# Patient Record
Sex: FEMALE | Race: WHITE | NOT HISPANIC OR LATINO | Employment: OTHER
[De-identification: names, ages, dates, MRNs, and addresses within clinical notes are randomized per-mention and may not be internally consistent; named-entity substitution may affect disease eponyms.]

---

## 2019-06-06 ENCOUNTER — TRANSCRIBE ORDERS (OUTPATIENT)
Dept: SCHEDULING | Age: 69
End: 2019-06-06

## 2019-06-06 DIAGNOSIS — J38.3 OTHER DISEASES OF VOCAL CORDS: Primary | ICD-10-CM

## 2019-06-06 DIAGNOSIS — J30.9 ALLERGIC RHINITIS: ICD-10-CM

## 2019-06-06 DIAGNOSIS — R05.9 COUGH: ICD-10-CM

## 2019-06-06 DIAGNOSIS — R06.02 SHORTNESS OF BREATH: ICD-10-CM

## 2019-06-18 ENCOUNTER — HOSPITAL ENCOUNTER (OUTPATIENT)
Dept: PULMONOLOGY | Facility: HOSPITAL | Age: 69
Discharge: HOME | End: 2019-06-18
Attending: ALLERGY & IMMUNOLOGY
Payer: MEDICARE

## 2019-06-18 ENCOUNTER — HOSPITAL ENCOUNTER (OUTPATIENT)
Dept: RADIOLOGY | Facility: HOSPITAL | Age: 69
Discharge: HOME | End: 2019-06-18
Attending: ALLERGY & IMMUNOLOGY
Payer: MEDICARE

## 2019-06-18 ENCOUNTER — TRANSCRIBE ORDERS (OUTPATIENT)
Dept: LAB | Facility: HOSPITAL | Age: 69
End: 2019-06-18

## 2019-06-18 ENCOUNTER — APPOINTMENT (OUTPATIENT)
Dept: LAB | Facility: HOSPITAL | Age: 69
End: 2019-06-18
Attending: ALLERGY & IMMUNOLOGY
Payer: MEDICARE

## 2019-06-18 DIAGNOSIS — T78.05XA ANAPHYLACTIC REACTION DUE TO TREE NUTS AND SEEDS: ICD-10-CM

## 2019-06-18 DIAGNOSIS — R05.9 COUGH: ICD-10-CM

## 2019-06-18 DIAGNOSIS — J38.3 OTHER DISEASES OF VOCAL CORDS: ICD-10-CM

## 2019-06-18 DIAGNOSIS — R79.9 ABNORMAL FINDING OF BLOOD CHEMISTRY: ICD-10-CM

## 2019-06-18 DIAGNOSIS — R06.02 SHORTNESS OF BREATH: ICD-10-CM

## 2019-06-18 DIAGNOSIS — Z79.899 OTHER LONG TERM (CURRENT) DRUG THERAPY: ICD-10-CM

## 2019-06-18 DIAGNOSIS — J45.40 MODERATE PERSISTENT ASTHMA, UNCOMPLICATED: ICD-10-CM

## 2019-06-18 DIAGNOSIS — J30.9 ALLERGIC RHINITIS: ICD-10-CM

## 2019-06-18 DIAGNOSIS — Z91.018 ALLERGY TO OTHER FOODS: ICD-10-CM

## 2019-06-18 DIAGNOSIS — R79.9 ABNORMAL FINDING OF BLOOD CHEMISTRY: Primary | ICD-10-CM

## 2019-06-18 DIAGNOSIS — D83.0 COMMON VARIABLE IMMUNODEFICIENCY WITH PREDOMINANT ABNORMALITIES OF B-CELL NUMBERS AND FUNCTION: ICD-10-CM

## 2019-06-18 LAB
ALBUMIN SERPL-MCNC: 4 G/DL (ref 3.4–5)
ALP SERPL-CCNC: 59 IU/L (ref 35–126)
ALT SERPL-CCNC: 22 IU/L (ref 11–54)
ANION GAP SERPL CALC-SCNC: 7 MEQ/L (ref 3–15)
AST SERPL-CCNC: 23 IU/L (ref 15–41)
BASOPHILS # BLD: 0.03 K/UL (ref 0.01–0.1)
BASOPHILS NFR BLD: 0.5 %
BILIRUB SERPL-MCNC: 0.4 MG/DL (ref 0.3–1.2)
BUN SERPL-MCNC: 14 MG/DL (ref 8–20)
CALCIUM SERPL-MCNC: 9.5 MG/DL (ref 8.9–10.3)
CHLORIDE SERPL-SCNC: 108 MEQ/L (ref 98–109)
CO2 SERPL-SCNC: 24 MEQ/L (ref 22–32)
CREAT SERPL-MCNC: 0.8 MG/DL
DIFFERENTIAL METHOD BLD: NORMAL
EOSINOPHIL # BLD: 0.11 K/UL (ref 0.04–0.36)
EOSINOPHIL NFR BLD: 1.9 %
ERYTHROCYTE [DISTWIDTH] IN BLOOD BY AUTOMATED COUNT: 13.1 % (ref 11.7–14.4)
GFR SERPL CREATININE-BSD FRML MDRD: >60 ML/MIN/1.73M*2
GLUCOSE SERPL-MCNC: 95 MG/DL (ref 70–99)
HCT VFR BLDCO AUTO: 43 %
HGB BLD-MCNC: 14 G/DL
IMM GRANULOCYTES # BLD AUTO: 0.01 K/UL (ref 0–0.08)
IMM GRANULOCYTES NFR BLD AUTO: 0.2 %
LYMPHOCYTES # BLD: 2.58 K/UL (ref 1.2–3.5)
LYMPHOCYTES NFR BLD: 44.7 %
MAGNESIUM SERPL-MCNC: 2.1 MG/DL (ref 1.8–2.5)
MCH RBC QN AUTO: 31.5 PG (ref 28–33.2)
MCHC RBC AUTO-ENTMCNC: 32.6 G/DL (ref 32.2–35.5)
MCV RBC AUTO: 96.6 FL (ref 83–98)
MONOCYTES # BLD: 0.37 K/UL (ref 0.28–0.8)
MONOCYTES NFR BLD: 6.4 %
NEUTROPHILS # BLD: 2.67 K/UL (ref 1.7–7)
NEUTS SEG NFR BLD: 46.3 %
NRBC BLD-RTO: 0 %
PDW BLD AUTO: 10.1 FL (ref 9.4–12.3)
PLATELET # BLD AUTO: 232 K/UL
POTASSIUM SERPL-SCNC: 4.5 MEQ/L (ref 3.6–5.1)
PROT SERPL-MCNC: 6.5 G/DL (ref 6–8.2)
RBC # BLD AUTO: 4.45 M/UL (ref 3.93–5.22)
SODIUM SERPL-SCNC: 139 MEQ/L (ref 136–144)
T4 FREE SERPL-MCNC: 0.77 NG/DL (ref 0.58–1.64)
TSH SERPL DL<=0.05 MIU/L-ACNC: 3.51 MIU/L (ref 0.34–5.6)
WBC # BLD AUTO: 5.77 K/UL

## 2019-06-18 PROCEDURE — 86003 ALLG SPEC IGE CRUDE XTRC EA: CPT | Mod: 59

## 2019-06-18 PROCEDURE — 94729 DIFFUSING CAPACITY: CPT

## 2019-06-18 PROCEDURE — 83735 ASSAY OF MAGNESIUM: CPT

## 2019-06-18 PROCEDURE — 71250 CT THORAX DX C-: CPT

## 2019-06-18 PROCEDURE — 86003 ALLG SPEC IGE CRUDE XTRC EA: CPT

## 2019-06-18 PROCEDURE — 85025 COMPLETE CBC W/AUTO DIFF WBC: CPT

## 2019-06-18 PROCEDURE — 86160 COMPLEMENT ANTIGEN: CPT | Mod: 59

## 2019-06-18 PROCEDURE — 70486 CT MAXILLOFACIAL W/O DYE: CPT

## 2019-06-18 PROCEDURE — 84443 ASSAY THYROID STIM HORMONE: CPT

## 2019-06-18 PROCEDURE — 82103 ALPHA-1-ANTITRYPSIN TOTAL: CPT

## 2019-06-18 PROCEDURE — 82164 ANGIOTENSIN I ENZYME TEST: CPT

## 2019-06-18 PROCEDURE — 82784 ASSAY IGA/IGD/IGG/IGM EACH: CPT

## 2019-06-18 PROCEDURE — 86162 COMPLEMENT TOTAL (CH50): CPT

## 2019-06-18 PROCEDURE — 86160 COMPLEMENT ANTIGEN: CPT

## 2019-06-18 PROCEDURE — 84165 PROTEIN E-PHORESIS SERUM: CPT

## 2019-06-18 PROCEDURE — 36415 COLL VENOUS BLD VENIPUNCTURE: CPT

## 2019-06-18 PROCEDURE — 84439 ASSAY OF FREE THYROXINE: CPT

## 2019-06-18 PROCEDURE — 94060 EVALUATION OF WHEEZING: CPT

## 2019-06-18 PROCEDURE — 80053 COMPREHEN METABOLIC PANEL: CPT

## 2019-06-18 PROCEDURE — 86038 ANTINUCLEAR ANTIBODIES: CPT

## 2019-06-18 PROCEDURE — 25000000 HC PHARMACY GENERAL: Performed by: ALLERGY & IMMUNOLOGY

## 2019-06-18 PROCEDURE — 86431 RHEUMATOID FACTOR QUANT: CPT

## 2019-06-18 RX ORDER — ALBUTEROL SULFATE 0.83 MG/ML
2.5 SOLUTION RESPIRATORY (INHALATION) ONCE
Status: COMPLETED | OUTPATIENT
Start: 2019-06-18 | End: 2019-06-18

## 2019-06-18 RX ADMIN — ALBUTEROL SULFATE 2.5 MG: 2.5 SOLUTION RESPIRATORY (INHALATION) at 14:30

## 2019-06-19 LAB
A1AT SERPL-MCNC: 149 MG/DL (ref 83–199)
ACE SERPL-CCNC: 46 U/L (ref 9–67)
ALBUMIN MFR UR ELPH: 53.7 % (ref 48–62)
ALBUMIN SERPL ELPH-MCNC: 3.49 G/DL (ref 3.2–4.5)
ALBUMIN/GLOB SERPL: 1.2 {RATIO} (ref 1.1–2.1)
ALMOND IGE QN: <0.35 KU/L
ALPHA1 GLOB MFR SERPL ELPH: 3.3 % (ref 2.1–4.8)
ALPHA1 GLOB SERPL ELPH-MCNC: 0.22 G/DL (ref 0.15–0.32)
ALPHA2 GLOB MFR UR ELPH: 15.2 % (ref 9.7–16.2)
ALPHA2 GLOB SERPL ELPH-MCNC: 0.99 G/DL (ref 0.7–1.1)
B-GLOBULIN SERPL ELPH-MCNC: 0.98 G/DL (ref 0.73–1.3)
BETA1 GLOB MFR UR ELPH: 15.1 % (ref 10.8–17.9)
BRAZIL NUT IGE QN: <0.35 KU/L
C3 SERPL-MCNC: 120 MG/DL (ref 80–200)
C4 SERPL-MCNC: 25 MG/DL (ref 16–45)
CASHEW NUT IGE QN: <0.35 KU/L
CH50 SERPL-ACNC: >60 U/ML (ref 31–60)
CLAM IGE QN: <0.35 KU/L
CRAB IGE/IGE TOTAL %: <0.35 KU/L
GAMMA GLOB MFR UR ELPH: 12.7 % (ref 9–23)
GAMMA GLOB SERPL ELPH-MCNC: 0.82 G/DL (ref 0.6–1.6)
HAZELNUT IGE QN: <0.35 KU/L
LOBSTER IGE QN: <0.35 KU/L
MACADAMIA IGE QN: <0.35 KU/L
PEANUT IGE QN: <0.1 KU/L
PECAN/HICK NUT IGE QN: <0.35 KU/L
PISTACHIO IGE QN: <0.35 KU/L
PROT PATTERN SERPL ELPH-IMP: NORMAL
PROT SERPL-MCNC: 6.5 G/DL (ref 6–8.2)
RHEUMATOID FACT SERPL-ACNC: <20 IU/ML
SHRIMP IGE QN: <0.35 KU/L
WALNUT IGE QN: <0.35 KU/L

## 2019-06-20 LAB
ANA SER QL IA: NEGATIVE
IGA SERPL-MCNC: 157 MG/DL (ref 85–385)
IGG SERPL-MCNC: 926 MG/DL (ref 750–1560)
IGM SERPL-MCNC: 44 MG/DL (ref 46–295)

## 2019-06-24 ENCOUNTER — TELEPHONE (OUTPATIENT)
Dept: SCHEDULING | Facility: CLINIC | Age: 69
End: 2019-06-24

## 2019-06-24 NOTE — TELEPHONE ENCOUNTER
Yisel is being referred by Dr Conrado Boss.   She has been having Sob  and abnormal cardiology testing.   She doesn't have a cardiologist however she has had two Cardiac Clearances done for surgical procedures.   One was done most recently @ [facility]  [facility]  (78)Average Rating of all Office Providers   [facility]  [address]  [place] [id number]  View All [phone]  She will contact them today and request that her records be faxed to [phone]  She couldn't remember the other location here she has had testing, she will look through her records.   No appts were available for [place], please call @[phone]

## 2019-06-24 NOTE — TELEPHONE ENCOUNTER
Dr. Khalil are we going to see her within the two weeks or can she be seen in three weeks when there is availability?

## 2019-06-24 NOTE — TELEPHONE ENCOUNTER
I reviewed her chart.  There is nothing threatening based on the information I reviewed.  She can be seen in 3 weeks.  If a new patient slot becomes available we can see her sooner.

## 2019-07-11 ENCOUNTER — TELEPHONE (OUTPATIENT)
Dept: CARDIOLOGY | Facility: CLINIC | Age: 69
End: 2019-07-11

## 2019-07-17 ENCOUNTER — OFFICE VISIT (OUTPATIENT)
Dept: CARDIOLOGY | Facility: CLINIC | Age: 69
End: 2019-07-17
Payer: MEDICARE

## 2019-07-17 VITALS
DIASTOLIC BLOOD PRESSURE: 85 MMHG | RESPIRATION RATE: 15 BRPM | HEIGHT: 61 IN | SYSTOLIC BLOOD PRESSURE: 120 MMHG | BODY MASS INDEX: 30.19 KG/M2 | HEART RATE: 74 BPM | WEIGHT: 159.9 LBS

## 2019-07-17 DIAGNOSIS — I77.89 ASCENDING AORTA ENLARGEMENT (CMS/HCC): ICD-10-CM

## 2019-07-17 DIAGNOSIS — J38.00 VOCAL CORD PARALYSIS: ICD-10-CM

## 2019-07-17 DIAGNOSIS — R06.02 SHORTNESS OF BREATH: ICD-10-CM

## 2019-07-17 DIAGNOSIS — I25.10 CORONARY ARTERY CALCIFICATION SEEN ON CT SCAN: ICD-10-CM

## 2019-07-17 DIAGNOSIS — R94.31 ABNORMAL ECG: Primary | ICD-10-CM

## 2019-07-17 PROBLEM — M47.812 CERVICAL SPONDYLOSIS: Status: ACTIVE | Noted: 2018-08-07

## 2019-07-17 PROBLEM — I05.9 MITRAL VALVE DISORDER: Status: ACTIVE | Noted: 2019-07-17

## 2019-07-17 PROBLEM — R91.8 OPACITY OF LUNG ON IMAGING STUDY: Status: ACTIVE | Noted: 2019-07-17

## 2019-07-17 PROCEDURE — 99205 OFFICE O/P NEW HI 60 MIN: CPT | Performed by: INTERNAL MEDICINE

## 2019-07-17 PROCEDURE — 93000 ELECTROCARDIOGRAM COMPLETE: CPT | Performed by: INTERNAL MEDICINE

## 2019-07-17 RX ORDER — MINERAL OIL
180 ENEMA (ML) RECTAL DAILY
COMMUNITY

## 2019-07-17 RX ORDER — ACETAMINOPHEN 500 MG
1000 TABLET ORAL DAILY PRN
COMMUNITY
End: 2023-03-07

## 2019-07-17 RX ORDER — FAMOTIDINE 20 MG/1
20 TABLET, FILM COATED ORAL DAILY
COMMUNITY

## 2019-07-17 RX ORDER — BUDESONIDE AND FORMOTEROL FUMARATE DIHYDRATE 160; 4.5 UG/1; UG/1
2 AEROSOL RESPIRATORY (INHALATION) 2 TIMES DAILY
COMMUNITY
End: 2020-07-23 | Stop reason: ALTCHOICE

## 2019-07-17 ASSESSMENT — ENCOUNTER SYMPTOMS: SHORTNESS OF BREATH: 1

## 2019-07-17 NOTE — PROGRESS NOTES
"Subjective      Patient ID: Yisel Lyle is a 69 y.o. female.  1950      HPI    The following have been reviewed and updated as appropriate in this visit:  Tobacco  Allergies  Meds  Problems  Med Hx  Surg Hx  Fam Hx  Soc Hx        Review of Systems   Respiratory: Positive for shortness of breath.        Objective     Vitals:    07/17/19 1327   Pulse: 74   Resp: 15   Weight: 72.5 kg (159 lb 14.4 oz)   Height: 1.549 m (5' 1\")     Body mass index is 30.21 kg/m².    Physical Exam    Assessment/Plan   Diagnoses and all orders for this visit:    Abnormal ECG (Primary)  -     ECG 12 lead        "

## 2019-07-17 NOTE — LETTER
Emeterio Khalil MD, [facility]    Director, Clinical Cardiology-[facility] and  [facility]    The [employer] Chair in Innovative Cardiology    Clinical Associate Professor of Medicine  [facility]  [facility]  [facility]  [facility] [address]  [address] [place] [address]    TEL  [phone]  FAX: [phone]  EMAIL: [email or web address]         7/17/2019    Aylin Madden MD  [address] [place] [address], [address]  [place] [id number]    Yisel Lyle  1950    Dear Dr. Madden,      I saw Yisel Dariela in my office today on 7/17/2019 to provide an opinion as to the assessment and management of exertional dyspnea.      She is a 69 y.o. female with a history of cervical arthropathy who underwent an anterior cervical discectomy and fusion complicated by vocal cord paralysis who has been experiencing effort related shortness of breath since that surgery.  She notes that is she is unable to talk and exercise because she becomes breathless.  She also experiences breathlessness and near syncope with recumbency which is also developed since her surgery.    She has been unable to exercise effectively due to her exertional dyspnea.  She notices inspiratory noise when she takes deep breaths.    She reports that she is chronically fatigued which developed after her cervical surgery.    She was seen by Dr. Capo Brunner on 10/17/18 for preoperative risk assessment for her upcoming surgical fusion procedure due to an abnormal ECG.  That ECG performed on 10/17/19, which I personally reviewed, revealed diffuse low voltage.    Transthoracic echocardiography performed on 10/18/18, revealed:  Normal LV size and function with an ejection fraction of 65%.  There was grade 1 diastolic dysfunction.  There was no mitral prolapse or mitral regurgitation.  Ascending thoracic aorta was top normal in  size at 3.8 cm    Exercise echocardiography performed on 10/18/18 revealed:  Exercised to a workload of 7 METS.  Nonspecific ST-T wave changes were noted on her baseline ECG.  Baseline artifact was noted.  Exercise echo was normal.    She subsequently underwent her surgery in October 2018.    Unenhanced CT imaging of her chest (she has an anaphylactic allergy to iodinated contrast) performed to evaluate her dyspnea and cough (6/6/19) revealed normal heart size with mild coronary calcifications and a mildly enlarged ascending aorta of 4 cm.  The trachea and central airways appeared to be grossly patent.  Perhaps there was minimal diffuse bronchiectasis.    She has an appointment scheduled to see Dr. Nik Brennan.  She has not undergone pulmonary function testing as of yet.    There is a strong family history of coronary artery disease and multiple maternal uncles.  She is unaware of her current lipid profile.    She is a retired teacher and is .  She has 2 grown children.      Social History   Substance Use Topics   • Smoking status: Never Smoker   • Smokeless tobacco: Never Used   • Alcohol use Yes      Comment: rare        Past Medical History:   Diagnosis Date   • Abnormal ECG    • Ascending aorta enlargement (CMS/HCC) (HCC) 7/17/2019    The ascending aorta is mildly enlarged measuring approximately 4 cm diameter.  (6/6/2019)   • Coronary artery calcification seen on CT scan 7/17/2019    Mild coronary artery calcifications on CT of chest (6/6/2019)   • Opacity of lung on imaging study 7/17/2019    8mm subsolid opacity in the posterior right upper lobe, which may be inflammatory in nature (noted on CT of chest 6/6/2019)   • Shortness of breath 7/17/2019   • Vocal cord paralysis     compliacation of anterior cervical discectomy with fusion       Past Surgical History:   Procedure Laterality Date   • ANTERIOR CERVICAL DISCECTOMY W/ FUSION  10/2018   • CATARACT EXTRACTION W/  INTRAOCULAR LENS IMPLANT Left  2017   • COLONOSCOPY W/ POLYPECTOMY      colonoscopies every 3 years   • EYE SURGERY Left 2017    fror replacement of wrong lens from cataract surgery 2 weeks prior   • REDUCTION MAMMAPLASTY Bilateral 1988   • TONSILLECTOMY  1976   • VOCAL CORD INJECTION Right 03/2019    for paralysis after acdf          Family History   Problem Relation Age of Onset   • Alzheimer's disease Mother    • Colon cancer Father    • BEVERLY disease Sister    • Coronary artery disease Mother's Brother         in all 5 maternal uncles   • No Known Problems Son    • No Known Problems Son        ALLERGIES:  Allergies   Allergen Reactions   • Iodine And Iodide Containing Products Anaphylaxis     Contrast dye - welts, and throat closed   • Venom-Honey Bee Angioedema         Current Outpatient Prescriptions:   •  acetaminophen (TYLENOL) 500 mg tablet, Take 1,000 mg by mouth daily as needed for mild pain., Disp: , Rfl:   •  aspirin/acetaminophen/caffeine (EXCEDRIN EXTRA STRENGTH ORAL), Take 2 tablets by mouth daily as needed (pain).  , Disp: , Rfl:   •  budesonide-formoterol (SYMBICORT) 160-4.5 mcg/actuation inhaler, Inhale 2 puffs 2 (two) times a day. Rinse mouth with water after use to reduce aftertaste and incidence of candidiasis. Do not swallow., Disp: , Rfl:   •  famotidine (PEPCID) 20 mg tablet, Take 20 mg by mouth daily., Disp: , Rfl:   •  fexofenadine (ALLEGRA) 180 mg tablet, Take 180 mg by mouth daily.  , Disp: , Rfl:     Review of Systems   Constitution: +weight gain, +chronic fatigue,Negative for chills, fever .   HENT: Negative for congestion, hearing loss and nosebleeds.    Eyes: Negative for visual disturbance.   Cardiovascular: + Near syncope with recumbency, + Raynaud's phenomenon in her feet, negative for chest pain, claudication, irregular heartbeat, leg swelling, near-syncope, orthopnea, palpitations, paroxysmal nocturnal dyspnea  Respiratory: + cough, + shortness of breath.    Hematologic/Lymphatic: Negative for adenopathy. Does  "not bruise/bleed easily.   Skin: Negative for rash.   Musculoskeletal: Weakness in left arm after nerve injury, negative for falls, muscle weakness and myalgias.   Gastrointestinal: Negative for abdominal pain, anorexia, constipation, hematemesis, hematochezia, melena, nausea and vomiting.   Genitourinary: Negative for dysuria, frequency and nocturia.   Neurological: Negative for dizziness, focal weakness, headaches, light-headedness, numbness and paresthesias.       PHYSICAL EXAM  Vital Signs: /85 (BP Location: Right upper arm)   Pulse 74   Resp 15   Ht 1.549 m (5' 1\")   Wt 72.5 kg (159 lb 14.4 oz)   BMI 30.21 kg/m²    General: well-developed, obese, appears well, no acute distress  HEENT: sclera anicteric, conjunctiva pink, no xanthelasma, neck supple, no thyromegaly  Chest: Expiratory stridor with deep breathing, clear to auscultation, symmetrical expansion, no scoliosis   Heart: Apical impulse normally situated, regular rhythm, normal S1, normal S2, no gallops, no murmur  JVP: normal jugular venous pressure, negative AJR  Vascular: carotid pulses: intact bilaterally, no bruit, peripheral pulses: intact bilaterally  Abd: soft, non-tender, no hepatosplenomegaly, abdominal aorta not palpable  Ext: no edema, no clubbing, no cyanosis  Skin: warm, dry, no ecchymoses  Neuro: alert, oriented x 3, normal muscle strength  Psychiatric: normal affect, normal judgment, normal insight and normal memory    LABS:     Ref. Range 6/18/2019 11:30   Sodium Latest Ref Range: 136 - 144 mEQ/L 139   Potassium Latest Ref Range: 3.6 - 5.1 mEQ/L 4.5   Chloride Latest Ref Range: 98 - 109 mEQ/L 108   CO2 Latest Ref Range: 22 - 32 mEQ/L 24   BUN Latest Ref Range: 8 - 20 mg/dL 14   Creatinine Latest Ref Range: 0.6 - 1.1 mg/dL 0.8   Glucose Latest Ref Range: 70 - 99 mg/dL 95   Calcium Latest Ref Range: 8.9 - 10.3 mg/dL 9.5   AST (SGOT) Latest Ref Range: 15 - 41 IU/L 23   ALT (SGPT) Latest Ref Range: 11 - 54 IU/L 22   Alkaline " Phosphatase Latest Ref Range: 35 - 126 IU/L 59   Total Protein Latest Ref Range: 6.0 - 8.2 g/dL 6.5   Albumin Latest Ref Range: 3.4 - 5.0 g/dL 4.0   Bilirubin, Total Latest Ref Range: 0.3 - 1.2 mg/dL 0.4   eGFR Latest Ref Range: >=60.0 mL/min/1.73m*2 >60.0   Anion Gap Latest Ref Range: 3 - 15 mEQ/L 7   Complement, Total Latest Ref Range: 31 - 60 U/mL >60 (H)   IgA Latest Ref Range: 85 - 385 mg/dL 157   Magnesium Latest Ref Range: 1.8 - 2.5 mg/dL 2.1   TSH Latest Ref Range: 0.34 - 5.60 mIU/L 3.51   Free T4 Latest Ref Range: 0.58 - 1.64 ng/dL 0.77   Alpha 1 % Latest Ref Range: 2.1 - 4.8 % 3.3   Alpha 2  Latest Ref Range: 9.7 - 16.2 % 15.2   Beta 1  Latest Ref Range: 10.8 - 17.9 % 15.1   Gamma Globulin Latest Ref Range: 9.0 - 23.0 % 12.7   ALBUMIN/GLOBULIN RATIO Latest Ref Range: 1.1 - 2.1  1.2   SPEP Interpretation Unknown Normal protein wi...   ALPHA 1 GLOBULIN/TOTAL PROTEIN IN URINE BY ELECTOPHORESIS Latest Ref Range: 0.15 - 0.32 g/dL 0.22   Alpha 2 Latest Ref Range: 0.70 - 1.10 g/dL 0.99   Beta Latest Ref Range: 0.73 - 1.30 g/dL 0.98   Gamma Globulin Latest Ref Range: 0.60 - 1.60 g/dL 0.82   Albumin Electrophoresis Latest Ref Range: 3.2 - 4.5 g/dL 3.49   SULY Latest Ref Range: Negative  Negative   Rheumatoid Factor Latest Ref Range: <=20.0 IU/mL <20.0   C3 Complement Latest Ref Range: 80 - 200 mg/dL 120   C4 Complement Latest Ref Range: 16 - 45 mg/dL 25   Total IgG Latest Ref Range: 750-1,560 mg/dL 926   IgM Latest Ref Range: 46 - 295 mg/dL 44 (L)   WBC Latest Ref Range: 3.80 - 10.50 K/uL 5.77   RBC Latest Ref Range: 3.93 - 5.22 M/uL 4.45   Hemoglobin Latest Ref Range: 11.8 - 15.7 g/dL 14.0   Hematocrit Latest Ref Range: 35.0 - 45.0 % 43.0   MCV Latest Ref Range: 83.0 - 98.0 fL 96.6   MCH Latest Ref Range: 28.0 - 33.2 pg 31.5   MCHC Latest Ref Range: 32.2 - 35.5 g/dL 32.6   RDW Latest Ref Range: 11.7 - 14.4 % 13.1   Platelets Latest Ref Range: 150 - 369 K/uL 232   MPV Latest Ref Range: 9.4 - 12.3 fL 10.1    Differential Type Unknown Auto   nRBC Latest Ref Range: <=0.0 % 0.0   Immature Granulocytes Latest Units: % 0.2   Neutrophils Latest Units: % 46.3   Lymphocytes Latest Units: % 44.7   Monocytes Latest Units: % 6.4   Eosinophils Latest Units: % 1.9   Basophils Latest Units: % 0.5   Immature Granulocytes, Absolute Latest Ref Range: 0.00 - 0.08 K/uL 0.01   Neutrophils, Absolute Latest Ref Range: 1.70 - 7.00 K/uL 2.67   Lymphocytes, Absolute Latest Ref Range: 1.20 - 3.50 K/uL 2.58   Monocytes, Absolute Latest Ref Range: 0.28 - 0.80 K/uL 0.37   Eosinophils, Absolute Latest Ref Range: 0.04 - 0.36 K/uL 0.11   Basophils, Absolute Latest Ref Range: 0.01 - 0.10 K/uL 0.03   Alpha 1 Antitrypsin Latest Ref Range: 83 - 199 mg/dL 149   Angiotensin-1-Converting Enzyme Latest Ref Range: 9 - 67 U/L 46          ECG:  The electrocardiogram obtained today 7/17/2019 revealed Sinus  Rhythm at a rate of 74. Diffuse low voltage. Nonspecific T-abnormality.  When compared with the most recent ECG of 10/17/18 there was no significant change.      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  I suspect additional nerve damage may have developed resulting in dysfunction of her upper airway musculature and producing her exertional dyspnea.  I would also not be surprised if she had obstructive sleep apnea producing her fatigue.  She is scheduled to see Dr. Nik Brennan for his input.    Coronary calcification incidentally noted on CT scan 6/19:  Coronary calcification suggest the presence of coronary atherosclerosis.  This is especially relevant with her family history  of coronary on her mother side of the family.  She has had a negative stress test for ischemia in October 2018.  I have ordered a fasting lipid profile as well as an LPa.  She will almost assuredly require  lipid-lowering therapy based on this finding.    Abnormal ECG:  She has low voltage on her electrocardiogram but no findings on her echocardiogram to support an infiltrative process, pericardial effusion and no findings on her CT scan to explain the low voltage.  If no other explanation for her exertional dyspnea is identified, cardiac MRI would be reasonable.      No changes were made in her regimen today.   Listed below you will find the regimen that she will be requested to continue:      Current Outpatient Prescriptions:   •  acetaminophen (TYLENOL) 500 mg tablet, Take 1,000 mg by mouth daily as needed for mild pain., Disp: , Rfl:   •  aspirin/acetaminophen/caffeine (EXCEDRIN EXTRA STRENGTH ORAL), Take 2 tablets by mouth daily as needed (pain).  , Disp: , Rfl:   •  budesonide-formoterol (SYMBICORT) 160-4.5 mcg/actuation inhaler, Inhale 2 puffs 2 (two) times a day. Rinse mouth with water after use to reduce aftertaste and incidence of candidiasis. Do not swallow., Disp: , Rfl:   •  famotidine (PEPCID) 20 mg tablet, Take 20 mg by mouth daily., Disp: , Rfl:   •  fexofenadine (ALLEGRA) 180 mg tablet, Take 180 mg by mouth daily.  , Disp: , Rfl:       If there are no new interval cardiovascular problems, I will see her again in 6 months but will communicate with her in regard to her lab work.    I sincerely appreciate the opportunity to participate in the care of your patients. Please do not hesitate to contact me if any questions or problems arise.     With best wishes and warmest personal regards.      Sincerely,      mEeterio Khalil MD, [facility]    This document was generated utilizing voice recognition technology. A reasonable attempt at proofreading has been made to minimize errors but please excuse any typographical errors which may be present. Please call with any questions.    >60 minutes of encounter time spent with patient. Greater than 50% of the visit time spent discussing the following topics: test  results, management, risk reduction, and risk/ benefits of care.

## 2019-07-17 NOTE — PROGRESS NOTES
Emeterio Khalil MD, [facility]    Director, Clinical Cardiology-[facility] and  [facility]    The Jimbo Thompson III Chair in Innovative Cardiology    Clinical Associate Professor of Medicine  [facility]  [facility]  [facility]  [facility]  [address] [facility] [place] [address]    TEL  [phone]  FAX: [phone]  EMAIL: [email or web address]         7/17/2019    Aylin Madden MD  [address] [place] [address], [address]  [place] [address]    Yisel Lyle  1950    Dear Dr. Madden,      I saw Yisel Dariela in my office today on 7/17/2019 to provide an opinion as to the assessment and management of exertional dyspnea.      She is a 69 y.o. female with a history of cervical arthropathy who underwent an anterior cervical discectomy and fusion complicated by vocal cord paralysis who has been experiencing effort related shortness of breath since that surgery.  She notes that is she is unable to talk and exercise because she becomes breathless.  She also experiences breathlessness and near syncope with recumbency which is also developed since her surgery.    She has been unable to exercise effectively due to her exertional dyspnea.  She notices inspiratory noise when she takes deep breaths.    She reports that she is chronically fatigued which developed after her cervical surgery.    She was seen by Dr. Capo Brunner on 10/17/18 for preoperative risk assessment for her upcoming surgical fusion procedure due to an abnormal ECG.  That ECG performed on 10/17/19, which I personally reviewed, revealed diffuse low voltage.    Transthoracic echocardiography performed on 10/18/18, revealed:  Normal LV size and function with an ejection fraction of 65%.  There was grade 1 diastolic dysfunction.  There was no mitral prolapse or mitral regurgitation.  Ascending thoracic aorta was top normal in  size at 3.8 cm    Exercise echocardiography performed on 10/18/18 revealed:  Exercised to a workload of 7 METS.  Nonspecific ST-T wave changes were noted on her baseline ECG.  Baseline artifact was noted.  Exercise echo was normal.    She subsequently underwent her surgery in October 2018.    Unenhanced CT imaging of her chest (she has an anaphylactic allergy to iodinated contrast) performed to evaluate her dyspnea and cough (6/6/19) revealed normal heart size with mild coronary calcifications and a mildly enlarged ascending aorta of 4 cm.  The trachea and central airways appeared to be grossly patent.  Perhaps there was minimal diffuse bronchiectasis.    She has an appointment scheduled to see Dr. Nik Brennan.  She has not undergone pulmonary function testing as of yet.    There is a strong family history of coronary artery disease and multiple maternal uncles.  She is unaware of her current lipid profile.    She is a retired teacher and is .  She has 2 grown children.      Social History   Substance Use Topics   • Smoking status: Never Smoker   • Smokeless tobacco: Never Used   • Alcohol use Yes      Comment: rare        Past Medical History:   Diagnosis Date   • Abnormal ECG    • Ascending aorta enlargement (CMS/HCC) (HCC) 7/17/2019    The ascending aorta is mildly enlarged measuring approximately 4 cm diameter.  (6/6/2019)   • Coronary artery calcification seen on CT scan 7/17/2019    Mild coronary artery calcifications on CT of chest (6/6/2019)   • Opacity of lung on imaging study 7/17/2019    8mm subsolid opacity in the posterior right upper lobe, which may be inflammatory in nature (noted on CT of chest 6/6/2019)   • Shortness of breath 7/17/2019   • Vocal cord paralysis     compliacation of anterior cervical discectomy with fusion       Past Surgical History:   Procedure Laterality Date   • ANTERIOR CERVICAL DISCECTOMY W/ FUSION  10/2018   • CATARACT EXTRACTION W/  INTRAOCULAR LENS IMPLANT Left  2017   • COLONOSCOPY W/ POLYPECTOMY      colonoscopies every 3 years   • EYE SURGERY Left 2017    fror replacement of wrong lens from cataract surgery 2 weeks prior   • REDUCTION MAMMAPLASTY Bilateral 1988   • TONSILLECTOMY  1976   • VOCAL CORD INJECTION Right 03/2019    for paralysis after acdf          Family History   Problem Relation Age of Onset   • Alzheimer's disease Mother    • Colon cancer Father    • BEVERLY disease Sister    • Coronary artery disease Mother's Brother         in all 5 maternal uncles   • No Known Problems Son    • No Known Problems Son        ALLERGIES:  Allergies   Allergen Reactions   • Iodine And Iodide Containing Products Anaphylaxis     Contrast dye - welts, and throat closed   • Venom-Honey Bee Angioedema         Current Outpatient Prescriptions:   •  acetaminophen (TYLENOL) 500 mg tablet, Take 1,000 mg by mouth daily as needed for mild pain., Disp: , Rfl:   •  aspirin/acetaminophen/caffeine (EXCEDRIN EXTRA STRENGTH ORAL), Take 2 tablets by mouth daily as needed (pain).  , Disp: , Rfl:   •  budesonide-formoterol (SYMBICORT) 160-4.5 mcg/actuation inhaler, Inhale 2 puffs 2 (two) times a day. Rinse mouth with water after use to reduce aftertaste and incidence of candidiasis. Do not swallow., Disp: , Rfl:   •  famotidine (PEPCID) 20 mg tablet, Take 20 mg by mouth daily., Disp: , Rfl:   •  fexofenadine (ALLEGRA) 180 mg tablet, Take 180 mg by mouth daily.  , Disp: , Rfl:     Review of Systems   Constitution: +weight gain, +chronic fatigue,Negative for chills, fever .   HENT: Negative for congestion, hearing loss and nosebleeds.    Eyes: Negative for visual disturbance.   Cardiovascular: + Near syncope with recumbency, + Raynaud's phenomenon in her feet, negative for chest pain, claudication, irregular heartbeat, leg swelling, near-syncope, orthopnea, palpitations, paroxysmal nocturnal dyspnea  Respiratory: + cough, + shortness of breath.    Hematologic/Lymphatic: Negative for adenopathy. Does  "not bruise/bleed easily.   Skin: Negative for rash.   Musculoskeletal: Weakness in left arm after nerve injury, negative for falls, muscle weakness and myalgias.   Gastrointestinal: Negative for abdominal pain, anorexia, constipation, hematemesis, hematochezia, melena, nausea and vomiting.   Genitourinary: Negative for dysuria, frequency and nocturia.   Neurological: Negative for dizziness, focal weakness, headaches, light-headedness, numbness and paresthesias.       PHYSICAL EXAM  Vital Signs: /85 (BP Location: Right upper arm)   Pulse 74   Resp 15   Ht 1.549 m (5' 1\")   Wt 72.5 kg (159 lb 14.4 oz)   BMI 30.21 kg/m²    General: well-developed, obese, appears well, no acute distress  HEENT: sclera anicteric, conjunctiva pink, no xanthelasma, neck supple, no thyromegaly  Chest: Expiratory stridor with deep breathing, clear to auscultation, symmetrical expansion, no scoliosis   Heart: Apical impulse normally situated, regular rhythm, normal S1, normal S2, no gallops, no murmur  JVP: normal jugular venous pressure, negative AJR  Vascular: carotid pulses: intact bilaterally, no bruit, peripheral pulses: intact bilaterally  Abd: soft, non-tender, no hepatosplenomegaly, abdominal aorta not palpable  Ext: no edema, no clubbing, no cyanosis  Skin: warm, dry, no ecchymoses  Neuro: alert, oriented x 3, normal muscle strength  Psychiatric: normal affect, normal judgment, normal insight and normal memory    LABS:     Ref. Range 6/18/2019 11:30   Sodium Latest Ref Range: 136 - 144 mEQ/L 139   Potassium Latest Ref Range: 3.6 - 5.1 mEQ/L 4.5   Chloride Latest Ref Range: 98 - 109 mEQ/L 108   CO2 Latest Ref Range: 22 - 32 mEQ/L 24   BUN Latest Ref Range: 8 - 20 mg/dL 14   Creatinine Latest Ref Range: 0.6 - 1.1 mg/dL 0.8   Glucose Latest Ref Range: 70 - 99 mg/dL 95   Calcium Latest Ref Range: 8.9 - 10.3 mg/dL 9.5   AST (SGOT) Latest Ref Range: 15 - 41 IU/L 23   ALT (SGPT) Latest Ref Range: 11 - 54 IU/L 22   Alkaline " Phosphatase Latest Ref Range: 35 - 126 IU/L 59   Total Protein Latest Ref Range: 6.0 - 8.2 g/dL 6.5   Albumin Latest Ref Range: 3.4 - 5.0 g/dL 4.0   Bilirubin, Total Latest Ref Range: 0.3 - 1.2 mg/dL 0.4   eGFR Latest Ref Range: >=60.0 mL/min/1.73m*2 >60.0   Anion Gap Latest Ref Range: 3 - 15 mEQ/L 7   Complement, Total Latest Ref Range: 31 - 60 U/mL >60 (H)   IgA Latest Ref Range: 85 - 385 mg/dL 157   Magnesium Latest Ref Range: 1.8 - 2.5 mg/dL 2.1   TSH Latest Ref Range: 0.34 - 5.60 mIU/L 3.51   Free T4 Latest Ref Range: 0.58 - 1.64 ng/dL 0.77   Alpha 1 % Latest Ref Range: 2.1 - 4.8 % 3.3   Alpha 2  Latest Ref Range: 9.7 - 16.2 % 15.2   Beta 1  Latest Ref Range: 10.8 - 17.9 % 15.1   Gamma Globulin Latest Ref Range: 9.0 - 23.0 % 12.7   ALBUMIN/GLOBULIN RATIO Latest Ref Range: 1.1 - 2.1  1.2   SPEP Interpretation Unknown Normal protein wi...   ALPHA 1 GLOBULIN/TOTAL PROTEIN IN URINE BY ELECTOPHORESIS Latest Ref Range: 0.15 - 0.32 g/dL 0.22   Alpha 2 Latest Ref Range: 0.70 - 1.10 g/dL 0.99   Beta Latest Ref Range: 0.73 - 1.30 g/dL 0.98   Gamma Globulin Latest Ref Range: 0.60 - 1.60 g/dL 0.82   Albumin Electrophoresis Latest Ref Range: 3.2 - 4.5 g/dL 3.49   SULY Latest Ref Range: Negative  Negative   Rheumatoid Factor Latest Ref Range: <=20.0 IU/mL <20.0   C3 Complement Latest Ref Range: 80 - 200 mg/dL 120   C4 Complement Latest Ref Range: 16 - 45 mg/dL 25   Total IgG Latest Ref Range: 750-1,560 mg/dL 926   IgM Latest Ref Range: 46 - 295 mg/dL 44 (L)   WBC Latest Ref Range: 3.80 - 10.50 K/uL 5.77   RBC Latest Ref Range: 3.93 - 5.22 M/uL 4.45   Hemoglobin Latest Ref Range: 11.8 - 15.7 g/dL 14.0   Hematocrit Latest Ref Range: 35.0 - 45.0 % 43.0   MCV Latest Ref Range: 83.0 - 98.0 fL 96.6   MCH Latest Ref Range: 28.0 - 33.2 pg 31.5   MCHC Latest Ref Range: 32.2 - 35.5 g/dL 32.6   RDW Latest Ref Range: 11.7 - 14.4 % 13.1   Platelets Latest Ref Range: 150 - 369 K/uL 232   MPV Latest Ref Range: 9.4 - 12.3 fL 10.1    Differential Type Unknown Auto   nRBC Latest Ref Range: <=0.0 % 0.0   Immature Granulocytes Latest Units: % 0.2   Neutrophils Latest Units: % 46.3   Lymphocytes Latest Units: % 44.7   Monocytes Latest Units: % 6.4   Eosinophils Latest Units: % 1.9   Basophils Latest Units: % 0.5   Immature Granulocytes, Absolute Latest Ref Range: 0.00 - 0.08 K/uL 0.01   Neutrophils, Absolute Latest Ref Range: 1.70 - 7.00 K/uL 2.67   Lymphocytes, Absolute Latest Ref Range: 1.20 - 3.50 K/uL 2.58   Monocytes, Absolute Latest Ref Range: 0.28 - 0.80 K/uL 0.37   Eosinophils, Absolute Latest Ref Range: 0.04 - 0.36 K/uL 0.11   Basophils, Absolute Latest Ref Range: 0.01 - 0.10 K/uL 0.03   Alpha 1 Antitrypsin Latest Ref Range: 83 - 199 mg/dL 149   Angiotensin-1-Converting Enzyme Latest Ref Range: 9 - 67 U/L 46          ECG:  The electrocardiogram obtained today 7/17/2019 revealed Sinus  Rhythm at a rate of 74. Diffuse low voltage. Nonspecific T-abnormality.  When compared with the most recent ECG of 10/17/18 there was no significant change.      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  I suspect additional nerve damage may have developed resulting in dysfunction of her upper airway musculature and producing her exertional dyspnea.  I would also not be surprised if she had obstructive sleep apnea producing her fatigue.  She is scheduled to see Dr. Nik Brennan for his input.    Coronary calcification incidentally noted on CT scan 6/19:  Coronary calcification suggest the presence of coronary atherosclerosis.  This is especially relevant with her family history  of coronary on her mother side of the family.  She has had a negative stress test for ischemia in October 2018.  I have ordered a fasting lipid profile as well as an LPa.  She will almost assuredly require  lipid-lowering therapy based on this finding.    Abnormal ECG:  She has low voltage on her electrocardiogram but no findings on her echocardiogram to support an infiltrative process, pericardial effusion and no findings on her CT scan to explain the low voltage.  If no other explanation for her exertional dyspnea is identified, cardiac MRI would be reasonable.      No changes were made in her regimen today.   Listed below you will find the regimen that she will be requested to continue:      Current Outpatient Prescriptions:   •  acetaminophen (TYLENOL) 500 mg tablet, Take 1,000 mg by mouth daily as needed for mild pain., Disp: , Rfl:   •  aspirin/acetaminophen/caffeine (EXCEDRIN EXTRA STRENGTH ORAL), Take 2 tablets by mouth daily as needed (pain).  , Disp: , Rfl:   •  budesonide-formoterol (SYMBICORT) 160-4.5 mcg/actuation inhaler, Inhale 2 puffs 2 (two) times a day. Rinse mouth with water after use to reduce aftertaste and incidence of candidiasis. Do not swallow., Disp: , Rfl:   •  famotidine (PEPCID) 20 mg tablet, Take 20 mg by mouth daily., Disp: , Rfl:   •  fexofenadine (ALLEGRA) 180 mg tablet, Take 180 mg by mouth daily.  , Disp: , Rfl:       If there are no new interval cardiovascular problems, I will see her again in 6 months but will communicate with her in regard to her lab work.    I sincerely appreciate the opportunity to participate in the care of your patients. Please do not hesitate to contact me if any questions or problems arise.     With best wishes and warmest personal regards.      Sincerely,      Emeterio Khalil MD, [facility]    This document was generated utilizing voice recognition technology. A reasonable attempt at proofreading has been made to minimize errors but please excuse any typographical errors which may be present. Please call with any questions.    >60 minutes of encounter time spent with patient. Greater than 50% of the visit time spent discussing the following topics: test  results, management, risk reduction, and risk/ benefits of care.

## 2019-07-26 ENCOUNTER — APPOINTMENT (OUTPATIENT)
Dept: LAB | Facility: HOSPITAL | Age: 69
End: 2019-07-26
Attending: INTERNAL MEDICINE
Payer: MEDICARE

## 2019-07-26 DIAGNOSIS — I25.10 CORONARY ARTERY CALCIFICATION SEEN ON CT SCAN: ICD-10-CM

## 2019-07-26 LAB
ALBUMIN SERPL-MCNC: 4.2 G/DL (ref 3.4–5)
ALP SERPL-CCNC: 57 IU/L (ref 35–126)
ALT SERPL-CCNC: 20 IU/L (ref 11–54)
ANION GAP SERPL CALC-SCNC: 9 MEQ/L (ref 3–15)
AST SERPL-CCNC: 25 IU/L (ref 15–41)
BILIRUB SERPL-MCNC: 0.9 MG/DL (ref 0.3–1.2)
BUN SERPL-MCNC: 23 MG/DL (ref 8–20)
CALCIUM SERPL-MCNC: 9.9 MG/DL (ref 8.9–10.3)
CHLORIDE SERPL-SCNC: 104 MEQ/L (ref 98–109)
CHOLEST SERPL-MCNC: 277 MG/DL
CK SERPL-CCNC: 102 U/L (ref 15–200)
CO2 SERPL-SCNC: 28 MEQ/L (ref 22–32)
CREAT SERPL-MCNC: 0.8 MG/DL
GFR SERPL CREATININE-BSD FRML MDRD: >60 ML/MIN/1.73M*2
GLUCOSE SERPL-MCNC: 101 MG/DL (ref 70–99)
HDLC SERPL-MCNC: 86 MG/DL
HDLC SERPL: 3.2 {RATIO}
LDLC SERPL CALC-MCNC: 179 MG/DL
LPA SERPL-MCNC: 3.1 MG/DL
NONHDLC SERPL-MCNC: 191 MG/DL
POTASSIUM SERPL-SCNC: 4.8 MEQ/L (ref 3.6–5.1)
PROT SERPL-MCNC: 6.6 G/DL (ref 6–8.2)
SODIUM SERPL-SCNC: 141 MEQ/L (ref 136–144)
TRIGL SERPL-MCNC: 61 MG/DL (ref 30–149)

## 2019-07-26 PROCEDURE — 80061 LIPID PANEL: CPT

## 2019-07-26 PROCEDURE — 83695 ASSAY OF LIPOPROTEIN(A): CPT

## 2019-07-26 PROCEDURE — 80053 COMPREHEN METABOLIC PANEL: CPT

## 2019-07-26 PROCEDURE — 36415 COLL VENOUS BLD VENIPUNCTURE: CPT

## 2019-07-26 PROCEDURE — 82550 ASSAY OF CK (CPK): CPT

## 2019-08-05 ENCOUNTER — HOSPITAL ENCOUNTER (OUTPATIENT)
Dept: SLEEP MEDICINE | Facility: HOSPITAL | Age: 69
Discharge: HOME | End: 2019-08-05
Attending: INTERNAL MEDICINE
Payer: MEDICARE

## 2019-08-05 DIAGNOSIS — G47.33 OSA (OBSTRUCTIVE SLEEP APNEA): ICD-10-CM

## 2019-08-05 PROCEDURE — 95810 POLYSOM 6/> YRS 4/> PARAM: CPT

## 2019-08-12 ENCOUNTER — TELEPHONE (OUTPATIENT)
Dept: CARDIOLOGY | Facility: CLINIC | Age: 69
End: 2019-08-12

## 2019-08-12 DIAGNOSIS — E78.5 DYSLIPIDEMIA: Primary | ICD-10-CM

## 2019-08-12 RX ORDER — ROSUVASTATIN CALCIUM 20 MG/1
20 TABLET, COATED ORAL DAILY
Qty: 90 TABLET | Refills: 3 | Status: SHIPPED | OUTPATIENT
Start: 2019-08-12 | End: 2020-01-15

## 2019-08-12 NOTE — TELEPHONE ENCOUNTER
----- Message from Emeterio Khalil MD sent at 8/9/2019 10:12 AM EDT -----  In light of her coronary calcifications on her CT scan despite her high HDL, she needs to begin statin therapy.  Would initiate rosuvastatin 20 mg a day.  Repeat her labs in 3 months.

## 2019-08-12 NOTE — TELEPHONE ENCOUNTER
I spoke with the patient at length regarding her calcifications, the need to start statin therapy, the rationale to start statin therapy as well as we need her to check her blood work in 3 months time.  Should she experience any myalgias she will get back in touch with us.

## 2019-09-24 ENCOUNTER — TELEPHONE (OUTPATIENT)
Dept: SCHEDULING | Facility: CLINIC | Age: 69
End: 2019-09-24

## 2019-09-24 NOTE — TELEPHONE ENCOUNTER
[facility] requesting pts last OVN.  OVN were efaxed.            [facility]-Ashlee TOW[place]END-[phone]  [phone]

## 2019-10-15 ENCOUNTER — LAB REQUISITION (OUTPATIENT)
Dept: LAB | Facility: HOSPITAL | Age: 69
End: 2019-10-15
Attending: PHYSICIAN ASSISTANT
Payer: MEDICARE

## 2019-10-15 DIAGNOSIS — K59.00 CONSTIPATION, UNSPECIFIED: ICD-10-CM

## 2019-10-15 PROCEDURE — G0328 FECAL BLOOD SCRN IMMUNOASSAY: HCPCS | Performed by: PHYSICIAN ASSISTANT

## 2019-10-16 LAB — HEMOCCULT STL QL IA: NEGATIVE

## 2019-11-16 ENCOUNTER — TRANSCRIBE ORDERS (OUTPATIENT)
Dept: SCHEDULING | Age: 69
End: 2019-11-16

## 2019-11-16 DIAGNOSIS — G45.9 TRANSIENT CEREBRAL ISCHEMIC ATTACK, UNSPECIFIED: Primary | ICD-10-CM

## 2019-11-22 ENCOUNTER — HOSPITAL ENCOUNTER (OUTPATIENT)
Dept: RADIOLOGY | Age: 69
Discharge: HOME | End: 2019-11-22
Attending: ALLERGY & IMMUNOLOGY
Payer: MEDICARE

## 2019-11-22 ENCOUNTER — HOSPITAL ENCOUNTER (OUTPATIENT)
Dept: CARDIOLOGY | Facility: HOSPITAL | Age: 69
Discharge: HOME | End: 2019-11-22
Attending: ALLERGY & IMMUNOLOGY
Payer: MEDICARE

## 2019-11-22 DIAGNOSIS — G45.9 TRANSIENT CEREBRAL ISCHEMIC ATTACK, UNSPECIFIED: ICD-10-CM

## 2019-11-22 LAB
LEFT CCA DIST DIAS: 19.2 CM/S
LEFT CCA DIST SYS: 66.77 CM/S
LEFT CCA MID DIAS: 19.51 CM/S
LEFT CCA MID SYS: 72.73 CM/S
LEFT CCA PROX DIAS: 15.74 CM/S
LEFT CCA PROX SYS: 60.7 CM/S
LEFT ECA DIAS: 8.72 CM/S
LEFT ECA SYS: 46.27 CM/S
LEFT ICA DIST DIAS: 11.27 CM/S
LEFT ICA DIST SYS: 33.97 CM/S
LEFT ICA MID DIAS: 15.2 CM/S
LEFT ICA MID SYS: 37.28 CM/S
LEFT ICA PROX DIAS: 13.54 CM/S
LEFT ICA PROX SYS: 35.96 CM/S
LEFT ICA/CCA SYS: 0.56
LEFT VERTEBRAL DIAS: 12.48 CM/S
LEFT VERTEBRAL SYS: 43.48 CM/S
LT ECA PROX EDV: 8.72 CM/S
LT ECA PROX PSV: 46.27 CM/S
LT VERTEBRAL PROX EDV: 12.48 CM/S
LT VERTEBRAL PROX PSV: 43.48 CM/S
RIGHT CCA DIST DIAS: 15.59 CM/S
RIGHT CCA DIST SYS: 56.66 CM/S
RIGHT CCA MID DIAS: 18.76 CM/S
RIGHT CCA MID SYS: 69.66 CM/S
RIGHT CCA PROX DIAS: 15.85 CM/S
RIGHT CCA PROX SYS: 63.84 CM/S
RIGHT ECA DIAS: 9.53 CM/S
RIGHT ECA SYS: 51.78 CM/S
RIGHT ICA DIST DIAS: 14.65 CM/S
RIGHT ICA DIST SYS: 46.28 CM/S
RIGHT ICA MID DIAS: 19.5 CM/S
RIGHT ICA MID SYS: 52.84 CM/S
RIGHT ICA PROX DIAS: 11.33 CM/S
RIGHT ICA PROX SYS: 36.43 CM/S
RIGHT ICA/CCA SYS: 0.93
RIGHT VERTEBRAL DIAS: 12.13 CM/S
RIGHT VERTEBRAL SYS: 35.68 CM/S
RT ECA PROC EDV: 9.53 CM/S
RT VERTEBRAL PROX EDV: 12.13 CM/S

## 2019-11-22 PROCEDURE — 93880 EXTRACRANIAL BILAT STUDY: CPT

## 2019-12-09 ENCOUNTER — OFFICE VISIT (OUTPATIENT)
Dept: NEUROLOGY | Facility: CLINIC | Age: 69
End: 2019-12-09
Payer: MEDICARE

## 2019-12-09 VITALS — SYSTOLIC BLOOD PRESSURE: 124 MMHG | HEART RATE: 55 BPM | DIASTOLIC BLOOD PRESSURE: 80 MMHG | RESPIRATION RATE: 16 BRPM

## 2019-12-09 DIAGNOSIS — G45.9 TIA (TRANSIENT ISCHEMIC ATTACK): Primary | ICD-10-CM

## 2019-12-09 DIAGNOSIS — R25.1 TREMOR: ICD-10-CM

## 2019-12-09 PROCEDURE — 99205 OFFICE O/P NEW HI 60 MIN: CPT | Performed by: PSYCHIATRY & NEUROLOGY

## 2019-12-09 RX ORDER — FLUTICASONE PROPIONATE 50 MCG
2 SPRAY, SUSPENSION (ML) NASAL DAILY
COMMUNITY

## 2019-12-09 RX ORDER — ASPIRIN 81 MG/1
81 TABLET ORAL DAILY
COMMUNITY

## 2019-12-09 NOTE — LETTER
December 9, 2019     Conrado Boss MD  [address] [facility] [address]  [place] [address]    Patient: Yisel Lyle  YOB: 1950  Date of Visit: 12/9/2019      Dear Dr. Boss:    Thank you for referring Yisel Lyle to me for evaluation. Below are my notes for this consultation.    If you have questions, please do not hesitate to call me. I look forward to following your patient along with you.         Sincerely,        Renato Diaz MD        CC: MD Emeterio Humphrey MD Margolies, Lucas Z, MD  12/9/2019 10:10 AM  Signed  Yisel Lyle is a 69 y.o. female  12/9/2019  Aylin Madden MD    Neurology Consult Note    Subjective     Yisel Lyle is a 69 y.o. female who is being evaluated for a transient event.  The patient reported that years ago she was experiencing neck pain which would radiate into the left arm.  She had numbness and weakness in the left arm.  She was diagnosed with radiculopathy and in 2018 had cervical spine surgery.  This unfortunately resulted in vocal cord paralysis.  5 years ago she was evaluated by neurology because of intermittent tremulousness in the left arm and hand.  She was told that she has a benign essential tremor.    The patient reported that 3 weeks ago she woke up in her usual state of health denying any illness, trauma or inciting event.  She reported that she went to the movies with a friend and all of a sudden she was unable to talk.  She reported that her language comprehension was normal.  It sounds as though she was able to make sound although what she was saying did not make much sense.  She does not think that her speech was slurred.  She reported that she could see words in her head and knew what she wanted to say however was unable to speak clearly.  This event lasted about 2 minutes and resolved.  She has had no similar symptoms in the past.  She denied any associated medical or neurologic symptoms with her  event.    The patient reported that she has chronic headaches however has had no neurologic symptoms with her headaches other than visual scotoma.  She has no history of seizure, TIA, stroke, concussion or meningitis.  She does have a history of sleep apnea and sleeps with a CPAP.  She had an MRI of her brain which showed prominent small vessel disease.  An MRA of the head and neck and carotid ultrasound were unremarkable.  She had an echo stress test in June which was unremarkable.  Detailed neurologic and medical review of systems was otherwise unremarkable.  There were no symptoms to suggest increased intracranial pressure, meningitis or systemic illness.    Review of Systems  Constitutional: negative  Eyes: negative  Ears, nose, mouth, throat, and face: negative  Respiratory: negative  Cardiovascular: negative  Gastrointestinal: negative  Genitourinary:negative  Integument/breast: negative  Hematologic/lymphatic: negative  Musculoskeletal:negative  Neurological: negative  Behavioral/Psych: negative  Endocrine: negative  Allergic/Immunologic: negative    Allergy:  Allergies   Allergen Reactions   • Iodine And Iodide Containing Products Anaphylaxis     Contrast dye - welts, and throat closed   • Venom-Honey Bee Angioedema       Current Outpatient Medications   Medication Sig Dispense Refill   • aspirin 81 mg enteric coated tablet Take 81 mg by mouth daily.     • fluticasone propionate (FLONASE) 50 mcg/actuation nasal spray Administer 2 sprays into each nostril daily.     • acetaminophen (TYLENOL) 500 mg tablet Take 1,000 mg by mouth daily as needed for mild pain.     • aspirin/acetaminophen/caffeine (EXCEDRIN EXTRA STRENGTH ORAL) Take 2 tablets by mouth daily as needed (pain).       • budesonide-formoterol (SYMBICORT) 160-4.5 mcg/actuation inhaler Inhale 2 puffs 2 (two) times a day. Rinse mouth with water after use to reduce aftertaste and incidence of candidiasis. Do not swallow.     • famotidine (PEPCID) 20 mg  tablet Take 20 mg by mouth daily.     • fexofenadine (ALLEGRA) 180 mg tablet Take 180 mg by mouth daily.       • rosuvastatin (CRESTOR) 20 mg tablet Take 1 tablet (20 mg total) by mouth daily. 90 tablet 3     No current facility-administered medications for this visit.        Past Medical History:  Past Medical History:   Diagnosis Date   • Abnormal ECG    • Ascending aorta enlargement (CMS/HCC) 7/17/2019    The ascending aorta is mildly enlarged measuring approximately 4 cm diameter.  (6/6/2019)   • Coronary artery calcification seen on CT scan 7/17/2019    Mild coronary artery calcifications on CT of chest (6/6/2019)   • Opacity of lung on imaging study 7/17/2019    8mm subsolid opacity in the posterior right upper lobe, which may be inflammatory in nature (noted on CT of chest 6/6/2019)   • Shortness of breath 7/17/2019   • Vocal cord paralysis     compliacation of anterior cervical discectomy with fusion       Past Surgical History:  Past Surgical History:   Procedure Laterality Date   • ANTERIOR CERVICAL DISCECTOMY W/ FUSION  10/2018   • CATARACT EXTRACTION W/  INTRAOCULAR LENS IMPLANT Left 2017   • COLONOSCOPY W/ POLYPECTOMY      colonoscopies every 3 years   • EYE SURGERY Left 2017    fror replacement of wrong lens from cataract surgery 2 weeks prior   • REDUCTION MAMMAPLASTY Bilateral 1988   • TONSILLECTOMY  1976   • VOCAL CORD INJECTION Right 03/2019    for paralysis after acdf       Social History:  Social History     Socioeconomic History   • Marital status:      Spouse name: None   • Number of children: None   • Years of education: None   • Highest education level: None   Occupational History   • None   Social Needs   • Financial resource strain: None   • Food insecurity:     Worry: None     Inability: None   • Transportation needs:     Medical: None     Non-medical: None   Tobacco Use   • Smoking status: Never Smoker   • Smokeless tobacco: Never Used   Substance and Sexual Activity   • Alcohol  use: Yes     Comment: rare   • Drug use: No   • Sexual activity: Never     Partners: Male   Lifestyle   • Physical activity:     Days per week: None     Minutes per session: None   • Stress: None   Relationships   • Social connections:     Talks on phone: None     Gets together: None     Attends Restoration service: None     Active member of club or organization: None     Attends meetings of clubs or organizations: None     Relationship status: None   • Intimate partner violence:     Fear of current or ex partner: None     Emotionally abused: None     Physically abused: None     Forced sexual activity: None   Other Topics Concern   • None   Social History Narrative   • None       Family History:  Family History   Problem Relation Age of Onset   • Alzheimer's disease Biological Mother    • Colon cancer Biological Father    • BEVERLY disease Biological Sister    • Coronary artery disease Mother's Brother         in all 5 maternal uncles   • No Known Problems Biological Son    • No Known Problems Biological Son        Objective     Physical Exam  Visit Vitals  /80   Pulse (!) 55   Resp 16       General Appearance:  Alert, no distress, appears stated age   Head:  Normocephalic, without obvious abnormality, atraumatic   Eyes:  PERRL, conjunctiva/corneas clear, EOM's intact   Throat:  The tongue and uvula were midline   Neck: Supple, symmetrical, trachea midline, no carotid bruit or JVD   Lungs:  Clear to auscultation bilaterally, respirations unlabored   Chest Wall: No tenderness or deformity   Heart Regular rate and rhythm, S1 and S2 normal, no murmur, rub or gallop   Extremities: Extremities normal, atraumatic, no cyanosis or edema    Musculoskeletal: No injury or deformity   Pulses: 2+ and symmetric all extremities   Skin: Skin color, texture, turgor normal, no rashes or lesions   Behavior/Emotional: Appropriate, cooperative   Neurologic Exam:  Alert and oriented. Attention, concentration, memory, language, visual  spatial orientation, executive function is normal.    Pupils equal round and reactive to light. Extraocular movement full with normal pursuit + saccades. No nystagmus noted.   Facial strength and sensation is normal. Hearing normal.  The tongue and uvula were midline. No dysarthria or dysphagia.   Strength was 5/5 in bulbar, axial + extremity muscles.there was mild giveaway weakness in the left arm.  There was normal bulk and tone with no abnormal movements.   The sensory examination was normal to touch, temperature and pain, vibration and proprioception. There was no dysmetria or cerebellar signs.   The gait was narrow based. Patient was able to tandem walk. Negative Romberg sign. Reflexes were ++ and symmetric. Guy sign was negative. Plantar responses were flexor.     Data Reviewed:  I reviewed the patient's MRI of the brain with her which showed prominent small vessel disease.  An MRA of the head and neck were normal.  An echocardiogram/stress test in June was unremarkable.  A carotid ultrasound was normal.    Problem List Items Addressed This Visit        Nervous    TIA (transient ischemic attack) - Primary    Current Assessment & Plan     It is obviously impossible to know exactly what happened weeks ago however the patient symptoms are clearly concerning for a TIA.  We spoke at length regarding the diagnosis, pathophysiology, treatment and prognosis of a TIA.  At this point the patient has had extensive diagnostics which were unrevealing aside from prominent small vessel disease.  In the future if she were to have a recurrent event I would consider a loop recorder.    The patient's MRI of the brain did show extensive small vessel disease.  She is on an aspirin and in the future her statin could be increased.  Medical risk factors such as hypertension should be aggressively managed in the future.  We did discuss the importance of presenting to the emergency room immediately in the future if she has any new  events as there is a short time window in which receive an intervention if she were to have a stroke.            Other    Tremor    Current Assessment & Plan     The patient reported a tremor in the left arm although her examination today was unremarkable.  She clearly does not have Parkinson's disease.  She could have a benign tremor although again it was not present on exam.  For now we will take a wait and see approach.  Certainly in the future if her symptoms negatively impacted her activities of daily living or quality of life we could consider initiating treatment with primidone or propranolol.  Propranolol could be helpful for both stroke prophylaxis as well as tremor although it could affect her breathing.  Additional diagnostics and treatments will depend on her clinical course.                 It was a real pleasure meeting Yisel Lyle today, thank you for allowing me to participate in the medical care. If you have any questions, please call me at any time. Yisel Lyle will follow up with me in the coming weeks to months and keep me updated by telephone. Yisel Lyle knows to notify me immediately if there is any change in the condition or if there are any new symptoms of transient or static neurologic dysfunction.    Renato Diaz MD

## 2019-12-09 NOTE — ASSESSMENT & PLAN NOTE
The patient reported a tremor in the left arm although her examination today was unremarkable.  She clearly does not have Parkinson's disease.  She could have a benign tremor although again it was not present on exam.  For now we will take a wait and see approach.  Certainly in the future if her symptoms negatively impacted her activities of daily living or quality of life we could consider initiating treatment with primidone or propranolol.  Propranolol could be helpful for both stroke prophylaxis as well as tremor although it could affect her breathing.  Additional diagnostics and treatments will depend on her clinical course.

## 2019-12-09 NOTE — ASSESSMENT & PLAN NOTE
It is obviously impossible to know exactly what happened weeks ago however the patient symptoms are clearly concerning for a TIA.  We spoke at length regarding the diagnosis, pathophysiology, treatment and prognosis of a TIA.  At this point the patient has had extensive diagnostics which were unrevealing aside from prominent small vessel disease.  In the future if she were to have a recurrent event I would consider a loop recorder.    The patient's MRI of the brain did show extensive small vessel disease.  She is on an aspirin and in the future her statin could be increased.  Medical risk factors such as hypertension should be aggressively managed in the future.  We did discuss the importance of presenting to the emergency room immediately in the future if she has any new events as there is a short time window in which receive an intervention if she were to have a stroke.

## 2019-12-19 ENCOUNTER — ANESTHESIA EVENT (OUTPATIENT)
Dept: ENDOSCOPY | Facility: HOSPITAL | Age: 69
End: 2019-12-19
Payer: MEDICARE

## 2019-12-19 ENCOUNTER — HOSPITAL ENCOUNTER (OUTPATIENT)
Facility: HOSPITAL | Age: 69
Discharge: HOME | End: 2019-12-19
Attending: INTERNAL MEDICINE | Admitting: INTERNAL MEDICINE
Payer: MEDICARE

## 2019-12-19 ENCOUNTER — ANESTHESIA (OUTPATIENT)
Dept: ENDOSCOPY | Facility: HOSPITAL | Age: 69
End: 2019-12-19
Payer: MEDICARE

## 2019-12-19 VITALS
WEIGHT: 150 LBS | TEMPERATURE: 96 F | DIASTOLIC BLOOD PRESSURE: 82 MMHG | RESPIRATION RATE: 20 BRPM | SYSTOLIC BLOOD PRESSURE: 153 MMHG | HEART RATE: 66 BPM | HEIGHT: 61 IN | OXYGEN SATURATION: 99 % | BODY MASS INDEX: 28.32 KG/M2

## 2019-12-19 DIAGNOSIS — K21.9 GASTROESOPHAGEAL REFLUX DISEASE, ESOPHAGITIS PRESENCE NOT SPECIFIED: ICD-10-CM

## 2019-12-19 PROCEDURE — 37000002 HC ANESTHESIA MAC: Performed by: INTERNAL MEDICINE

## 2019-12-19 PROCEDURE — 0DB98ZX EXCISION OF DUODENUM, VIA NATURAL OR ARTIFICIAL OPENING ENDOSCOPIC, DIAGNOSTIC: ICD-10-PCS | Performed by: INTERNAL MEDICINE

## 2019-12-19 PROCEDURE — 88305 TISSUE EXAM BY PATHOLOGIST: CPT | Performed by: INTERNAL MEDICINE

## 2019-12-19 PROCEDURE — 0DB78ZX EXCISION OF STOMACH, PYLORUS, VIA NATURAL OR ARTIFICIAL OPENING ENDOSCOPIC, DIAGNOSTIC: ICD-10-PCS | Performed by: INTERNAL MEDICINE

## 2019-12-19 PROCEDURE — 63600000 HC DRUGS/DETAIL CODE: Performed by: NURSE ANESTHETIST, CERTIFIED REGISTERED

## 2019-12-19 PROCEDURE — 63600000 HC DRUGS/DETAIL CODE: Performed by: ANESTHESIOLOGY

## 2019-12-19 PROCEDURE — 25000000 HC PHARMACY GENERAL: Performed by: NURSE ANESTHETIST, CERTIFIED REGISTERED

## 2019-12-19 PROCEDURE — 75000060 HC EGD BIOPSY: Performed by: INTERNAL MEDICINE

## 2019-12-19 PROCEDURE — 0DB28ZX EXCISION OF MIDDLE ESOPHAGUS, VIA NATURAL OR ARTIFICIAL OPENING ENDOSCOPIC, DIAGNOSTIC: ICD-10-PCS | Performed by: INTERNAL MEDICINE

## 2019-12-19 RX ORDER — PROPOFOL 10 MG/ML
INJECTION, EMULSION INTRAVENOUS CONTINUOUS PRN
Status: DISCONTINUED | OUTPATIENT
Start: 2019-12-19 | End: 2019-12-19 | Stop reason: SURG

## 2019-12-19 RX ORDER — KETOROLAC TROMETHAMINE 15 MG/ML
15 INJECTION, SOLUTION INTRAMUSCULAR; INTRAVENOUS ONCE
Status: COMPLETED | OUTPATIENT
Start: 2019-12-19 | End: 2019-12-19

## 2019-12-19 RX ORDER — PHENYLEPHRINE HCL IN 0.9% NACL 1 MG/10 ML
SYRINGE (ML) INTRAVENOUS AS NEEDED
Status: DISCONTINUED | OUTPATIENT
Start: 2019-12-19 | End: 2019-12-19 | Stop reason: SURG

## 2019-12-19 RX ORDER — IBUPROFEN 200 MG
16-32 TABLET ORAL AS NEEDED
Status: DISCONTINUED | OUTPATIENT
Start: 2019-12-19 | End: 2019-12-19 | Stop reason: HOSPADM

## 2019-12-19 RX ORDER — ONDANSETRON HYDROCHLORIDE 2 MG/ML
4 INJECTION, SOLUTION INTRAVENOUS ONCE
Status: DISCONTINUED | OUTPATIENT
Start: 2019-12-19 | End: 2019-12-19 | Stop reason: SDUPTHER

## 2019-12-19 RX ORDER — ONDANSETRON HYDROCHLORIDE 2 MG/ML
4 INJECTION, SOLUTION INTRAVENOUS
Status: DISCONTINUED | OUTPATIENT
Start: 2019-12-19 | End: 2019-12-19 | Stop reason: HOSPADM

## 2019-12-19 RX ORDER — SODIUM CHLORIDE 9 MG/ML
INJECTION, SOLUTION INTRAVENOUS CONTINUOUS
Status: DISCONTINUED | OUTPATIENT
Start: 2019-12-19 | End: 2019-12-19 | Stop reason: HOSPADM

## 2019-12-19 RX ORDER — DEXTROSE 40 %
15-30 GEL (GRAM) ORAL AS NEEDED
Status: DISCONTINUED | OUTPATIENT
Start: 2019-12-19 | End: 2019-12-19 | Stop reason: HOSPADM

## 2019-12-19 RX ORDER — PROPOFOL 200MG/20ML
SYRINGE (ML) INTRAVENOUS AS NEEDED
Status: DISCONTINUED | OUTPATIENT
Start: 2019-12-19 | End: 2019-12-19 | Stop reason: SURG

## 2019-12-19 RX ORDER — GLYCOPYRROLATE 0.6MG/3ML
SYRINGE (ML) INTRAVENOUS AS NEEDED
Status: DISCONTINUED | OUTPATIENT
Start: 2019-12-19 | End: 2019-12-19 | Stop reason: SURG

## 2019-12-19 RX ORDER — DEXTROSE 50 % IN WATER (D50W) INTRAVENOUS SYRINGE
25 AS NEEDED
Status: DISCONTINUED | OUTPATIENT
Start: 2019-12-19 | End: 2019-12-19 | Stop reason: HOSPADM

## 2019-12-19 RX ADMIN — Medication 100 MCG: at 10:22

## 2019-12-19 RX ADMIN — Medication 100 MG: at 10:12

## 2019-12-19 RX ADMIN — GLYCOPYRROLATE 0.2 MG: 0.2 INJECTION INTRAMUSCULAR; INTRAVENOUS at 10:26

## 2019-12-19 RX ADMIN — PROPOFOL 100 MG: 10 INJECTION, EMULSION INTRAVENOUS at 10:03

## 2019-12-19 RX ADMIN — Medication 100 MG: at 10:03

## 2019-12-19 RX ADMIN — ONDANSETRON HYDROCHLORIDE 4 MG: 2 INJECTION, SOLUTION INTRAMUSCULAR; INTRAVENOUS at 11:13

## 2019-12-19 RX ADMIN — PROPOFOL 50 MCG/KG/MIN: 10 INJECTION, EMULSION INTRAVENOUS at 09:56

## 2019-12-19 RX ADMIN — KETOROLAC TROMETHAMINE 15 MG: 15 INJECTION, SOLUTION INTRAMUSCULAR; INTRAVENOUS at 11:19

## 2019-12-19 RX ADMIN — PROPOFOL 100 MG: 10 INJECTION, EMULSION INTRAVENOUS at 10:12

## 2019-12-19 ASSESSMENT — ENCOUNTER SYMPTOMS: SHORTNESS OF BREATH: 1

## 2019-12-19 NOTE — ANESTHESIA PREPROCEDURE EVALUATION
Relevant Problems   CARDIOVASCULAR   (+) Mitral valve disorder      MUSCULOSKELETAL   (+) Cervical spondylosis      NEUROLOGY   (+) TIA (transient ischemic attack)      RESPIRATORY SYSTEM   (+) Shortness of breath       Anesthesia ROS/MED HX    Anesthesia History    Previous anesthetics  Pulmonary    Shortness of breath  Neuro/Psych    TIA  Cardiovascular   Valvular problems/murmurs   CAD   dyslipidemia  Comments: Denies loose  Hematological - neg  GI/Hepatic   GERD  Musculoskeletal- neg  Renal Disease- neg  Endo/Other- neg  Body Habitus: Overweight  ROS/MED HX Comments:    Anesthesia History: Pt had ACDF a number of years ago.  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  She describes what I think sounds like a thyroplasty.  She is not hoarse. She says her voice can tire and she will become hoarse but she is much improved from immediately post op.         Past Surgical History:   Procedure Laterality Date   • ANTERIOR CERVICAL DISCECTOMY W/ FUSION  10/2018   • CATARACT EXTRACTION W/  INTRAOCULAR LENS IMPLANT Left 2017   • COLONOSCOPY W/ POLYPECTOMY      colonoscopies every 3 years   • EYE SURGERY Left 2017    fror replacement of wrong lens from cataract surgery 2 weeks prior   • REDUCTION MAMMAPLASTY Bilateral 1988   • TONSILLECTOMY  1976   • VOCAL CORD INJECTION Right 03/2019    for paralysis after acdf       Physical Exam    Airway   Mallampati: I   TM distance: >3 FB   Neck ROM: full  Cardiovascular - normal   Rhythm: regular   Rate: normalPulmonary - normal   clear to auscultation  Other Findings   Denies loose  Dental    Teeth Problems: missing        Anesthesia Plan    Plan: MAC    Technique: MAC   ASA 2  Anesthetic plan and risks discussed with: patient       Wt Readings from Last 3 Encounters:   07/17/19 72.5 kg (159 lb 14.4 oz)       Temp Readings from Last 3 Encounters:   No data found for Temp       BP Readings from Last 3 Encounters:   12/09/19 124/80   07/17/19  120/85       Pulse Readings from Last 3 Encounters:   12/09/19 (!) 55   07/17/19 74       Lab Results   Component Value Date    WBC 5.77 06/18/2019    HGB 14.0 06/18/2019    HCT 43.0 06/18/2019    MCV 96.6 06/18/2019     06/18/2019       Lab Results   Component Value Date    GLUCOSE 101 (H) 07/26/2019    CALCIUM 9.9 07/26/2019     07/26/2019    K 4.8 07/26/2019    CO2 28 07/26/2019     07/26/2019    BUN 23 (H) 07/26/2019    CREATININE 0.8 07/26/2019       No results found for: HCGPREGUR, PREGSERUM, HCG, HCGQUANT          No current facility-administered medications for this encounter.        Prior to Admission medications    Medication Sig Start Date End Date Taking? Authorizing Provider   acetaminophen (TYLENOL) 500 mg tablet Take 1,000 mg by mouth daily as needed for mild pain.    Dinh Morales MD   aspirin 81 mg enteric coated tablet Take 81 mg by mouth daily.    Dinh Morales MD   aspirin/acetaminophen/caffeine (EXCEDRIN EXTRA STRENGTH ORAL) Take 2 tablets by mouth daily as needed (pain).      Dinh Morales MD   budesonide-formoterol (SYMBICORT) 160-4.5 mcg/actuation inhaler Inhale 2 puffs 2 (two) times a day. Rinse mouth with water after use to reduce aftertaste and incidence of candidiasis. Do not swallow.    Dinh Morales MD   famotidine (PEPCID) 20 mg tablet Take 20 mg by mouth daily.    Dinh Morales MD   fexofenadine (ALLEGRA) 180 mg tablet Take 180 mg by mouth daily.      Dinh Morales MD   fluticasone propionate (FLONASE) 50 mcg/actuation nasal spray Administer 2 sprays into each nostril daily.    Dinh Morales MD   rosuvastatin (CRESTOR) 20 mg tablet Take 1 tablet (20 mg total) by mouth daily. 8/12/19   Emeterio Khalil MD       Patient Active Problem List   Diagnosis   • Vocal cord paralysis   • Abnormal EKG   • Cervical spondylosis   • Mitral valve disorder   • Shortness of breath   • Coronary artery calcification seen on CT  scan   • Ascending aorta enlargement (CMS/HCC)   • Opacity of lung on imaging study   • TIA (transient ischemic attack)   • Tremor       Past Medical History:   Diagnosis Date   • Abnormal ECG    • Ascending aorta enlargement (CMS/HCC) 7/17/2019    The ascending aorta is mildly enlarged measuring approximately 4 cm diameter.  (6/6/2019)   • Coronary artery calcification seen on CT scan 7/17/2019    Mild coronary artery calcifications on CT of chest (6/6/2019)   • Opacity of lung on imaging study 7/17/2019    8mm subsolid opacity in the posterior right upper lobe, which may be inflammatory in nature (noted on CT of chest 6/6/2019)   • Shortness of breath 7/17/2019   • Vocal cord paralysis     compliacation of anterior cervical discectomy with fusion       Past Surgical History:   Procedure Laterality Date   • ANTERIOR CERVICAL DISCECTOMY W/ FUSION  10/2018   • CATARACT EXTRACTION W/  INTRAOCULAR LENS IMPLANT Left 2017   • COLONOSCOPY W/ POLYPECTOMY      colonoscopies every 3 years   • EYE SURGERY Left 2017    fror replacement of wrong lens from cataract surgery 2 weeks prior   • REDUCTION MAMMAPLASTY Bilateral 1988   • TONSILLECTOMY  1976   • VOCAL CORD INJECTION Right 03/2019    for paralysis after acdf

## 2019-12-19 NOTE — ANESTHESIA POSTPROCEDURE EVALUATION
Patient: Yisel Lyle    Procedure Summary     Date:  12/19/19 Room / Location:  [facility] 4 (D) / [facility]    Anesthesia Start:  0949 Anesthesia Stop:  1050    Procedure:  EGD TRANSORAL BIOPSY SINGLE/MULTIPLE [64208 (CPT®)] (N/A ) Diagnosis:       Gastroesophageal reflux disease, esophagitis presence not specified      (GERD)    Provider:  Cong Major MD Responsible Provider:  Asia Bateman MD    Anesthesia Type:  MAC ASA Status:  2          Anesthesia Type: MAC  PACU Vitals  12/19/2019 1044 - 12/19/2019 1141      12/19/2019  1047 12/19/2019  1050 12/19/2019  1115 12/19/2019  1120    BP:  118/79  120/69  124/62  137/81    Temp:  (!) 35.6 °C (96 °F)  --  --  --    Pulse:  73  77  76  71    Resp:  20  20  20  20    SpO2:  99 %  97 %  100 %  100 %              12/19/2019  1130             BP:  126/66       Temp:  --       Pulse:  69       Resp:  20       SpO2:  97 %               Anesthesia Post Evaluation    Pain management: satisfactory to patient  Patient location during evaluation: PACU  Patient participation: complete - patient participated  Level of consciousness: awake and alert  Cardiovascular status: acceptable  Airway Patency: adequate  Respiratory status: acceptable  Hydration status: stable  Anesthetic complications: no

## 2019-12-19 NOTE — H&P
GI Consult Note          Subjective   Yisel Lyle is a 69 y.o. female who was admitted for Gastroesophageal reflux disease, esophagitis presence not specified [K21.9]. The patient is a 69-year-old female with a history of sleep apnea, reflux disease, and cervical disc surgery in 2018 complicated by vocal cord paralysis, a history of Lyme disease, and sleep apnea.    The patient evidently had an anterior cervical discectomy in October 2019 that resulted in recurrent laryngeal nerve damage.  She had subsequent ongoing issues with shortness of breath and gasping for air and a cardiac etiology was unrevealing.  A pulmonary evaluation confirmed sleep apnea and she was placed on a CPAP device which unfortunately aggravated her baseline neck pain.    She had ongoing issues of hoarseness and coughing but also complained of intermittent dysphagia with solids with an apparent hold up in the cervical esophageal region.  All of these problems had been present primarily since the surgery although she did have a known history of reflux disease and was taking Pepcid daily with fair control.  She denied weight loss, a lack of appetite, nausea, vomiting, major reflux/heartburn, or abdominal pain.  She had baseline constipation controlled with daily MiraLAX.    The patient had an upper endoscopy and colonoscopy in March 2016.  Two small tubular adenomas removed from her cecum and the upper endoscopy showed a hiatal hernia.  Pathology showed she was negative for Helicobacter.    While it is doubtful that her complaints of hoarseness and coughing or related to reflux, her dysphagia is an appropriate indication for an upper endoscopy.  An FIT was sent and was negative.        Past Medical History:   Diagnosis Date   • Abnormal ECG    • Ascending aorta enlargement (CMS/HCC) 7/17/2019    The ascending aorta is mildly enlarged measuring approximately 4 cm diameter.  (6/6/2019)   • Coronary artery calcification seen on CT scan  "7/17/2019    Mild coronary artery calcifications on CT of chest (6/6/2019)   • GERD (gastroesophageal reflux disease)    • Opacity of lung on imaging study 7/17/2019    8mm subsolid opacity in the posterior right upper lobe, which may be inflammatory in nature (noted on CT of chest 6/6/2019)   • Shortness of breath 7/17/2019   • Vocal cord paralysis     compliacation of anterior cervical discectomy with fusion     Past Surgical History:   Procedure Laterality Date   • ANTERIOR CERVICAL DISCECTOMY W/ FUSION  10/2018   • CATARACT EXTRACTION W/  INTRAOCULAR LENS IMPLANT Left 2017   • COLONOSCOPY W/ POLYPECTOMY      colonoscopies every 3 years   • EYE SURGERY Left 2017    fror replacement of wrong lens from cataract surgery 2 weeks prior   • REDUCTION MAMMAPLASTY Bilateral 1988   • TONSILLECTOMY  1976   • VOCAL CORD INJECTION Right 03/2019    for paralysis after acdf     Allergies   Allergen Reactions   • Iodine And Iodide Containing Products Anaphylaxis     Contrast dye - welts, and throat closed   • Venom-Honey Bee Angioedema       Home Medications:  •  aspirin, Take 81 mg by mouth daily.  •  acetaminophen, Take 1,000 mg by mouth daily as needed for mild pain.  •  aspirin/acetaminophen/caffeine (EXCEDRIN EXTRA STRENGTH ORAL), Take 2 tablets by mouth daily as needed (pain).    •  budesonide-formoterol, Inhale 2 puffs 2 (two) times a day. Rinse mouth with water after use to reduce aftertaste and incidence of candidiasis. Do not swallow.  •  famotidine, Take 20 mg by mouth daily.  •  fexofenadine, Take 180 mg by mouth daily.    •  fluticasone propionate, Administer 2 sprays into each nostril daily.  •  rosuvastatin, Take 1 tablet (20 mg total) by mouth daily.        Physical Exam    Physical Exam  Visit Vitals  /85   Pulse 73   Resp 16   Ht 1.549 m (5' 1\")   Wt 68 kg (150 lb)   SpO2 99%   BMI 28.34 kg/m²       General appearance: no distress  Head: normocephalic  Lungs: clear to auscultation anteriorly, no " w/r/r  Heart: regular rate, no m/r/g  Abdomen: soft, non-tender; bowel sounds normal; no masses, no organomegaly  Neurologic: awake and alert and oriented        Assessment     Yisel Lyle is a 69 y.o. female who was admitted for Gastroesophageal reflux disease, esophagitis presence not specified [K21.9].     Plan:  - Plan for EGD/Colonoscopy today for further evaluation.  - Patient may be discharged today after procedure if stable.   - Further recommendations to follow after completion of procedure.             Cong Major MD  12/19/2019

## 2019-12-19 NOTE — OP NOTE
_______________________________________________________________________________  Patient Name: Yisel Lyle          Procedure Date: 12/19/2019 9:39 AM  MRN: [id number]                     Account Number: [id number]  YOB: 1950              Age: 69  Gender: Female                        Note Status: Finalized  Attending MD: MAVIS MEYERS MD~LUIGI  _______________________________________________________________________________  Procedure:           Upper GI endoscopy  Indications:         Chronic cough  Providers:           MAVIS MEYERS MD~LUIGI (Doctor), MARCEL DUNN DO~SHAUN (Fellow)  Referring MD:        SHERRY HARVEY MD  Requesting Provider:  Medicines:           General Anesthesia, See the Anesthesia note for  documentation of the administered medications  Complications:       No immediate complications. Estimated blood loss:  Minimal.  _______________________________________________________________________________  Procedure:           After obtaining informed consent, the endoscope was  passed under direct vision. Throughout the procedure,  the patient's blood pressure, pulse, and oxygen  saturations were monitored continuously. The patient had  an episode of laryngospasm during the procedure,  necesitating withdrawal of the scope and intubation.  Subsequently, the procedure was re-started. The  endoscope was introduced through the mouth, and advanced  to the second part of duodenum. The upper GI endoscopy  was accomplished without difficulty. The patient  tolerated the procedure well following intubation.  Findings:  The examined esophagus was normal. There were no rings, strictures,  stenosis or webs to explain symptoms. Mucosa was biopsied with a cold  forceps for histology randomly in the middle third of the esophagus to  rule out eosinphilic esophagitis. One specimen bottle was sent to  pathology. Estimated blood loss was minimal.  The Z-line was regular and was found 38 cm  from the incisors.  Localized mild inflammation characterized by erythema, friability and  granularity was found in the gastric antrum. Mild mucosal changes with  paler appearing mucosa were appreciated in the gastric body. Biopsies  were taken with a cold forceps for histology. Estimated blood loss was  minimal.  The cardia and gastric fundus were normal on retroflexion.  The duodenal bulb was normal.  Small patch with mild mucosal changes characterized by discoloration and  change in mucosal surface pattern was found in the second portion of the  duodenum. Biopsies were taken with a cold forceps for histology.  Estimated blood loss was minimal.  Impression:          - The examined esophagus was normal. There were no  rings, strictures, stenosis or webs to explain symptoms.  Mucosa was biopsied with a cold forceps for histology  randomly in the middle third of the esophagus to rule  out eosinphilic esophagitis. One specimen bottle was  sent to pathology  - Z-line regular, 38 cm from the incisors.  - Localized mild inflammation characterized by erythema,  friability and granularity was found in the gastric  antrum. Mild mucosal changes with paler appearing mucosa  were appreciated in the gastric body. Biopsies were  taken with a cold forceps for histology.  - Normal duodenal bulb.  - Small patch with mucosal changes in the duodenum.  Biopsied.  Recommendation:      - Discharge patient to home (ambulatory).  - Resume regular diet.  - Continue present medications.  - Await pathology results.  - Telephone GI office for pathology results in 1 week.  - Patient has a contact number available for  emergencies. The signs and symptoms of potential delayed  complications were discussed with the patient. Return to  normal activities tomorrow. Written discharge  instructions were provided to the patient.  Procedure Code(s):   --- Professional ---  07400, Esophagogastroduodenoscopy, flexible, transoral;  with biopsy, single or  multiple  Diagnosis Code(s):   --- Professional ---  K29.70, Gastritis, unspecified, without bleeding  K31.89, Other diseases of stomach and duodenum  R05, Cough  CPT copyright 2018 American Medical Association. All rights reserved.  The codes documented in this report are preliminary and upon  review may  be revised to meet current compliance requirements.  Cong Meyers MD  ________________________  CONG MEYERS MD~LUIGI  12/19/2019 11:39:51 AM  This report has been signed electronically.  Number of Addenda: 0  Note Initiated On: 12/19/2019 9:39 AM

## 2019-12-19 NOTE — ANESTHESIA PROCEDURE NOTES
Airway  Urgency: elective    Start Time: 12/19/2019 10:13 AM    General Information and Staff    Patient location during procedure: OR  Performed: resident/CRNA     Indications and Patient Condition  Indications for airway management: anesthesia  Sedation level: general  Preoxygenated: yes  Patient position: sniffing  Mask difficulty assessment: 1 - vent by mask    Final Airway Details  Final airway type: endotracheal airway      Successful airway: ETT    Successful intubation technique: video laryngoscopy  Facilitating devices/methods: intubating stylet  Endotracheal tube insertion site: oral  Blade: Curtis  Blade size: #3  Cormack-Lehane Classification: grade I - full view of glottis  Placement verified by: chest auscultation and capnometry   Measured from: lips  Number of attempts at approach: 1

## 2019-12-20 LAB
CASE RPRT: NORMAL
CLINICAL INFO: NORMAL
PATH REPORT.FINAL DX SPEC: NORMAL
PATH REPORT.GROSS SPEC: NORMAL

## 2020-01-14 NOTE — PROGRESS NOTES
Emeterio Khalil MD, [facility]    Director, Clinical Cardiology-[facility] and  Main [facility]    The Jimbo Thompson III Chair in Innovative Cardiology    Clinical Associate Professor of Medicine  [facility]  [facility]  [facility]  The Heart [address]  [address]  [place] [address]    TEL  [phone]  FAX: [phone]  EMAIL: [email or web address]   January 15, 2020    Aylin Madden MD  [address]  [place] [address]     Renato Diaz MD  Neurology  [facility]    Conrado Boss MD  [facility]    Nik Brennan MD  Pulmonary and Critical Care Medicine  [facility], [address]  [facility]  Fax: [phone]      Yisel Lyle  1950      Dear Colleagues,      I saw Yisel Dariela in my office today on January 15, 2020.    She is a 69 y.o. female with a history of cervical arthropathy status post anterior cervical discectomy and fusion complicated by vocal cord paralysis, mild coronary calcifications, and a mildly enlarged ascending aorta (4 cm; 6/19) who presents today for her 6-month follow-up visit.    She saw Dr. Brennan in July 2019 who agreed that 1 of her vocal cords was obstructing her upper airway and was likely cause of her dyspnea.  He also suspected she may have sleep apnea and recommended sleep testing which confirmed the presence of obstructive sleep apnea.  She is now using CPAP therapy for about 4 hours a night.    In November 2019 she was at the [place] with a friend and found herself that she was unable to speak.  She could see the words in her head but was unable to speak clearly.    She saw Dr. Renato Diaz on 12/9/19 was concerned that she may have had a TIA. He reviewed the results of her MRI of the brain and MRA of her carotids noted below.  He suggested that she continue her aspirin  "and that her statin therapy be intensified.    MRI of the brain and MR angiography of the neck and [place] (11/22/19) revealed:  IMPRESSION:  1.  No evidence of an acute infarction.  2.  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  3.  No evidence of a hemodynamically significant carotid or vertebral artery  stenosis.  4.  Unremarkable MR angiography of the [place].    She has lost about 5 pounds since her last visit.    She reports experiencing shortness of breath when exercising but had been exercising on an exercise bicycle until she developed musculoskeletal shoulder discomfort.    She also developed myalgias of her thighs and lower legs but she has continued rosuvastatin 20 mg a day.  The symptoms are bothersome and may awaken her from sleep in the evenings.    ALLERGIES:  Allergies   Allergen Reactions   • Iodine And Iodide Containing Products Anaphylaxis     Contrast dye - welts, and throat closed   • Anesthesia S/I-40 (Propofol) [Propofol]      Bottom half of body went numb   • Venom-Honey Bee Angioedema        I have reviewed the problem list, allergies, medications and social history, and they are up to date in the electronic record as of the current encounter.     ROS: The remainder of the review of systems is otherwise negative in detail for significant complaints.    No changes in her medications have been made since the last visit.    PHYSICAL EXAM:  Visit Vitals  /90   Pulse 71   Resp 15   Ht 1.549 m (5' 1\")   Wt 70.1 kg (154 lb 9.6 oz)   BMI 29.21 kg/m²     General: well-developed, overweight, appears well, no acute distress  HEENT: sclera anicteric, conjunctiva pink, no xanthelasma, neck supple, no thyromegaly  Chest: Expiratory stridor with deep breathing, clear to auscultation, symmetrical expansion, no scoliosis   Heart: Apical impulse " normally situated, regular rhythm, normal S1, normal S2, no gallops, no murmur  JVP: normal jugular venous pressure, negative AJR  Vascular: carotid pulses: intact bilaterally, no bruit, peripheral pulses: intact bilaterally  Abd: soft, non-tender, no hepatosplenomegaly, abdominal aorta not palpable  Ext: no edema, no clubbing, no cyanosis  Skin: warm, dry, no ecchymoses  Neuro: alert, oriented x 3, normal muscle strength  Psychiatric: normal affect, normal judgment, normal insight and normal memory    LABS       Ref. Range 7/26/2019 10:06   Sodium Latest Ref Range: 136 - 144 mEQ/L 141   Potassium Latest Ref Range: 3.6 - 5.1 mEQ/L 4.8   Chloride Latest Ref Range: 98 - 109 mEQ/L 104   CO2 Latest Ref Range: 22 - 32 mEQ/L 28   BUN Latest Ref Range: 8 - 20 mg/dL 23 (H)   Creatinine Latest Ref Range: 0.6 - 1.1 mg/dL 0.8   Glucose Latest Ref Range: 70 - 99 mg/dL 101 (H)   Calcium Latest Ref Range: 8.9 - 10.3 mg/dL 9.9   AST (SGOT) Latest Ref Range: 15 - 41 IU/L 25   ALT (SGPT) Latest Ref Range: 11 - 54 IU/L 20   Alkaline Phosphatase Latest Ref Range: 35 - 126 IU/L 57   Total Protein Latest Ref Range: 6.0 - 8.2 g/dL 6.6   Albumin Latest Ref Range: 3.4 - 5.0 g/dL 4.2   Bilirubin, Total Latest Ref Range: 0.3 - 1.2 mg/dL 0.9   eGFR Latest Ref Range: >=60.0 mL/min/1.73m*2 >60.0   Anion Gap Latest Ref Range: 3 - 15 mEQ/L 9   Cholesterol Latest Ref Range: <=200 mg/dL 277 (H)   Triglycerides Latest Ref Range: 30 - 149 mg/dL 61   HDL Latest Ref Range: >=55 mg/dL 86   LDL Calculated Latest Ref Range: <=100 mg/dL 179 (H)   Non-HDL, Calculated Latest Units: mg/dL 191   RISK Latest Ref Range: <=5.0  3.2   Total CK Latest Ref Range: 15 - 200 U/L 102   Lipoprotein (a) Latest Ref Range: <=30.0 mg/dL 3.1       ECG:    IMPRESSION & PLAN:  Exertional dyspnea, vocal cord paralysis, fatigue,:  These symptoms of all developed subsequent to her cervical surgery which was clearly 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  I suspect  additional nerve damage may have developed resulting in dysfunction of her upper airway musculature and producing her exertional dyspnea.    Her fatigue is likely due to her sleep apnea which is now being treated.       Coronary calcification incidentally noted on CT scan 6/19:  Coronary calcification suggest the presence of coronary atherosclerosis.  This is especially relevant with her family history of coronary on her mother side of the family.  She has had a negative stress test for ischemia in October 2018.    Hypercholesterolemia:  She clearly has hypercholesterolemia and despite a high HDL as evidence of coronary calcium and microvascular cerebral disease.  I believe that it is important for her to be on lipid-lowering therapy.  I suggested that she withhold her rosuvastatin until her leg discomfort resolves and then consider beginning 10 mg once a week increasing it gradually to as many days a week as she will tolerate.  She should have her lipids reexamined 1 month after taking the highest dose that she can tolerate.  If she is intolerant of rosuvastatin perhaps Livalo would be an alternative statin since it tends to be better tolerated from a myalgia standpoint and the other statins.  It is, however, not as potent as the other statins.     Abnormal ECG:  She has low voltage on her electrocardiogram but no findings on her echocardiogram to support an infiltrative process, pericardial effusion and no findings on her CT scan to explain the low voltage.      The aforementioned changes were made in her regimen today.  Listed below you will find the regimen that she will be requested to continue:      Current Outpatient Medications:   •  acetaminophen (TYLENOL) 500 mg tablet, Take 1,000 mg by mouth daily as needed for mild pain., Disp: , Rfl:   •  aspirin 81 mg enteric coated tablet, Take 81 mg by mouth daily., Disp: , Rfl:   •  aspirin/acetaminophen/caffeine (EXCEDRIN EXTRA STRENGTH ORAL), Take 2 tablets by  mouth daily as needed (pain).  , Disp: , Rfl:   •  budesonide-formoterol (SYMBICORT) 160-4.5 mcg/actuation inhaler, Inhale 2 puffs 2 (two) times a day. Rinse mouth with water after use to reduce aftertaste and incidence of candidiasis. Do not swallow., Disp: , Rfl:   •  famotidine (PEPCID) 20 mg tablet, Take 20 mg by mouth daily., Disp: , Rfl:   •  fexofenadine (ALLEGRA) 180 mg tablet, Take 180 mg by mouth daily.  , Disp: , Rfl:   •  fluticasone propionate (FLONASE) 50 mcg/actuation nasal spray, Administer 2 sprays into each nostril daily., Disp: , Rfl:   •  rosuvastatin (CRESTOR) 20 mg tablet, Take 0.5 tablets (10 mg total) by mouth daily. After stopping until leg symptoms improve, restart  1/2 tab Twice a week increasing if possible to daily., Disp: 90 tablet, Rfl: 3    If there are no new interval cardiovascular problems, I will see her again in 6 months.    I sincerely appreciate the opportunity to participate in the care of your patients. Please do not hesitate to contact me if any questions or problems arise.     With best wishes and warmest personal regards.      Sincerely,    Emeterio Khalil MD, [facility]    This document was generated utilizing voice recognition technology. A reasonable attempt at proofreading has been made to minimize errors but please excuse any typographical errors which may be present. Please call with any questions.

## 2020-01-15 ENCOUNTER — OFFICE VISIT (OUTPATIENT)
Dept: CARDIOLOGY | Facility: CLINIC | Age: 70
End: 2020-01-15
Payer: MEDICARE

## 2020-01-15 VITALS
HEIGHT: 61 IN | DIASTOLIC BLOOD PRESSURE: 90 MMHG | RESPIRATION RATE: 15 BRPM | BODY MASS INDEX: 29.19 KG/M2 | HEART RATE: 71 BPM | WEIGHT: 154.6 LBS | SYSTOLIC BLOOD PRESSURE: 135 MMHG

## 2020-01-15 DIAGNOSIS — R94.31 ABNORMAL ECG: Primary | ICD-10-CM

## 2020-01-15 DIAGNOSIS — R94.31 ABNORMAL EKG: ICD-10-CM

## 2020-01-15 DIAGNOSIS — I25.10 CORONARY ARTERY CALCIFICATION SEEN ON CT SCAN: ICD-10-CM

## 2020-01-15 DIAGNOSIS — I63.81 MULTIPLE LACUNAR INFARCTS (CMS/HCC): ICD-10-CM

## 2020-01-15 DIAGNOSIS — I05.9 MITRAL VALVE DISORDER: ICD-10-CM

## 2020-01-15 DIAGNOSIS — J38.00 VOCAL CORD PARALYSIS: ICD-10-CM

## 2020-01-15 DIAGNOSIS — G45.9 TIA (TRANSIENT ISCHEMIC ATTACK): ICD-10-CM

## 2020-01-15 PROBLEM — G47.33 OBSTRUCTIVE SLEEP APNEA SYNDROME: Status: ACTIVE | Noted: 2020-01-15

## 2020-01-15 PROCEDURE — 99214 OFFICE O/P EST MOD 30 MIN: CPT | Performed by: INTERNAL MEDICINE

## 2020-01-15 PROCEDURE — 93000 ELECTROCARDIOGRAM COMPLETE: CPT | Performed by: INTERNAL MEDICINE

## 2020-01-15 RX ORDER — ROSUVASTATIN CALCIUM 20 MG/1
10 TABLET, COATED ORAL DAILY
Qty: 90 TABLET | Refills: 3 | COMMUNITY
Start: 2020-01-15 | End: 2021-05-14 | Stop reason: SDUPTHER

## 2020-01-15 NOTE — LETTER
Emeterio Khalil MD, [facility]    Director, Clinical Cardiology-[facility] and  Main [facility]    The Jimbo Thompson III Chair in Innovative Cardiology    Clinical Associate Professor of Medicine  [facility]  [facility]  [facility]  The [facility] [address] [facility]  [address]  [place] [address]    TEL  [phone]  FAX: [phone]  EMAIL: [email or web address]   January 15, 2020    Aylin Madden MD  [address]  [place] [address]     Renato Diaz MD  Neurology  [facility]    Conrado Boss MD  [facility]    Nik Brennan MD  Pulmonary and Critical Care Medicine  [facility], [address]  [facility]  Fax: [phone]      Yisel Lyle  1950      Dear Colleagues,      I saw Yisel Dariela in my office today on January 15, 2020.    She is a 69 y.o. female with a history of cervical arthropathy status post anterior cervical discectomy and fusion complicated by vocal cord paralysis, mild coronary calcifications, and a mildly enlarged ascending aorta (4 cm; 6/19) who presents today for her 6-month follow-up visit.    She saw Dr. Brennan in July 2019 who agreed that 1 of her vocal cords was obstructing her upper airway and was likely cause of her dyspnea.  He also suspected she may have sleep apnea and recommended sleep testing which confirmed the presence of obstructive sleep apnea.  She is now using CPAP therapy for about 4 hours a night.    In November 2019 she was [place] with a friend and found herself that she was unable to speak.  She could see the words in her head but was unable to speak clearly.    She saw Dr. Renato Diaz on 12/9/19 was concerned that she may have had a TIA. He reviewed the results of her MRI of the brain and MRA of her carotids noted below.  He suggested that she continue her aspirin  "and that her statin therapy be intensified.    MRI of the brain and MR angiography of the neck and [place] (11/22/19) revealed:  IMPRESSION:  1.  No evidence of an acute infarction.  2.  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  3.  No evidence of a hemodynamically significant carotid or vertebral artery  stenosis.  4.  Unremarkable MR angiography of the [place].    She has lost about 5 pounds since her last visit.    She reports experiencing shortness of breath when exercising but had been exercising on an exercise bicycle until she developed musculoskeletal shoulder discomfort.    She also developed myalgias of her thighs and lower legs but she has continued rosuvastatin 20 mg a day.  The symptoms are bothersome and may awaken her from sleep in the evenings.    ALLERGIES:  Allergies   Allergen Reactions   • Iodine And Iodide Containing Products Anaphylaxis     Contrast dye - welts, and throat closed   • Anesthesia S/I-40 (Propofol) [Propofol]      Bottom half of body went numb   • Venom-Honey Bee Angioedema        I have reviewed the problem list, allergies, medications and social history, and they are up to date in the electronic record as of the current encounter.     ROS: The remainder of the review of systems is otherwise negative in detail for significant complaints.    No changes in her medications have been made since the last visit.    PHYSICAL EXAM:  Visit Vitals  /90   Pulse 71   Resp 15   Ht 1.549 m (5' 1\")   Wt 70.1 kg (154 lb 9.6 oz)   BMI 29.21 kg/m²     General: well-developed, overweight, appears well, no acute distress  HEENT: sclera anicteric, conjunctiva pink, no xanthelasma, neck supple, no thyromegaly  Chest: Expiratory stridor with deep breathing, clear to auscultation, symmetrical expansion, no scoliosis   Heart: Apical impulse " normally situated, regular rhythm, normal S1, normal S2, no gallops, no murmur  JVP: normal jugular venous pressure, negative AJR  Vascular: carotid pulses: intact bilaterally, no bruit, peripheral pulses: intact bilaterally  Abd: soft, non-tender, no hepatosplenomegaly, abdominal aorta not palpable  Ext: no edema, no clubbing, no cyanosis  Skin: warm, dry, no ecchymoses  Neuro: alert, oriented x 3, normal muscle strength  Psychiatric: normal affect, normal judgment, normal insight and normal memory    LABS       Ref. Range 7/26/2019 10:06   Sodium Latest Ref Range: 136 - 144 mEQ/L 141   Potassium Latest Ref Range: 3.6 - 5.1 mEQ/L 4.8   Chloride Latest Ref Range: 98 - 109 mEQ/L 104   CO2 Latest Ref Range: 22 - 32 mEQ/L 28   BUN Latest Ref Range: 8 - 20 mg/dL 23 (H)   Creatinine Latest Ref Range: 0.6 - 1.1 mg/dL 0.8   Glucose Latest Ref Range: 70 - 99 mg/dL 101 (H)   Calcium Latest Ref Range: 8.9 - 10.3 mg/dL 9.9   AST (SGOT) Latest Ref Range: 15 - 41 IU/L 25   ALT (SGPT) Latest Ref Range: 11 - 54 IU/L 20   Alkaline Phosphatase Latest Ref Range: 35 - 126 IU/L 57   Total Protein Latest Ref Range: 6.0 - 8.2 g/dL 6.6   Albumin Latest Ref Range: 3.4 - 5.0 g/dL 4.2   Bilirubin, Total Latest Ref Range: 0.3 - 1.2 mg/dL 0.9   eGFR Latest Ref Range: >=60.0 mL/min/1.73m*2 >60.0   Anion Gap Latest Ref Range: 3 - 15 mEQ/L 9   Cholesterol Latest Ref Range: <=200 mg/dL 277 (H)   Triglycerides Latest Ref Range: 30 - 149 mg/dL 61   HDL Latest Ref Range: >=55 mg/dL 86   LDL Calculated Latest Ref Range: <=100 mg/dL 179 (H)   Non-HDL, Calculated Latest Units: mg/dL 191   RISK Latest Ref Range: <=5.0  3.2   Total CK Latest Ref Range: 15 - 200 U/L 102   Lipoprotein (a) Latest Ref Range: <=30.0 mg/dL 3.1       ECG:    IMPRESSION & PLAN:  Exertional dyspnea, vocal cord paralysis, fatigue,:  These symptoms of all developed subsequent to her cervical surgery which was clearly 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  I suspect  additional nerve damage may have developed resulting in dysfunction of her upper airway musculature and producing her exertional dyspnea.    Her fatigue is likely due to her sleep apnea which is now being treated.       Coronary calcification incidentally noted on CT scan 6/19:  Coronary calcification suggest the presence of coronary atherosclerosis.  This is especially relevant with her family history of coronary on her mother side of the family.  She has had a negative stress test for ischemia in October 2018.    Hypercholesterolemia:  She clearly has hypercholesterolemia and despite a high HDL as evidence of coronary calcium and microvascular cerebral disease.  I believe that it is important for her to be on lipid-lowering therapy.  I suggested that she withhold her rosuvastatin until her leg discomfort resolves and then consider beginning 10 mg once a week increasing it gradually to as many days a week as she will tolerate.  She should have her lipids reexamined 1 month after taking the highest dose that she can tolerate.  If she is intolerant of rosuvastatin perhaps Livalo would be an alternative statin since it tends to be better tolerated from a myalgia standpoint and the other statins.  It is, however, not as potent as the other statins.     Abnormal ECG:  She has low voltage on her electrocardiogram but no findings on her echocardiogram to support an infiltrative process, pericardial effusion and no findings on her CT scan to explain the low voltage.      The aforementioned changes were made in her regimen today.  Listed below you will find the regimen that she will be requested to continue:      Current Outpatient Medications:   •  acetaminophen (TYLENOL) 500 mg tablet, Take 1,000 mg by mouth daily as needed for mild pain., Disp: , Rfl:   •  aspirin 81 mg enteric coated tablet, Take 81 mg by mouth daily., Disp: , Rfl:   •  aspirin/acetaminophen/caffeine (EXCEDRIN EXTRA STRENGTH ORAL), Take 2 tablets by  mouth daily as needed (pain).  , Disp: , Rfl:   •  budesonide-formoterol (SYMBICORT) 160-4.5 mcg/actuation inhaler, Inhale 2 puffs 2 (two) times a day. Rinse mouth with water after use to reduce aftertaste and incidence of candidiasis. Do not swallow., Disp: , Rfl:   •  famotidine (PEPCID) 20 mg tablet, Take 20 mg by mouth daily., Disp: , Rfl:   •  fexofenadine (ALLEGRA) 180 mg tablet, Take 180 mg by mouth daily.  , Disp: , Rfl:   •  fluticasone propionate (FLONASE) 50 mcg/actuation nasal spray, Administer 2 sprays into each nostril daily., Disp: , Rfl:   •  rosuvastatin (CRESTOR) 20 mg tablet, Take 0.5 tablets (10 mg total) by mouth daily. After stopping until leg symptoms improve, restart  1/2 tab Twice a week increasing if possible to daily., Disp: 90 tablet, Rfl: 3    If there are no new interval cardiovascular problems, I will see her again in 6 months.    I sincerely appreciate the opportunity to participate in the care of your patients. Please do not hesitate to contact me if any questions or problems arise.     With best wishes and warmest personal regards.      Sincerely,    Emeterio Khalil MD, [facility]    This document was generated utilizing voice recognition technology. A reasonable attempt at proofreading has been made to minimize errors but please excuse any typographical errors which may be present. Please call with any questions.

## 2020-06-26 ENCOUNTER — TELEMEDICINE (OUTPATIENT)
Dept: NEUROLOGY | Facility: CLINIC | Age: 70
End: 2020-06-26
Payer: MEDICARE

## 2020-06-26 DIAGNOSIS — R25.1 TREMOR: ICD-10-CM

## 2020-06-26 DIAGNOSIS — G45.9 TIA (TRANSIENT ISCHEMIC ATTACK): ICD-10-CM

## 2020-06-26 DIAGNOSIS — G47.33 OBSTRUCTIVE SLEEP APNEA SYNDROME: Primary | ICD-10-CM

## 2020-06-26 PROCEDURE — 99213 OFFICE O/P EST LOW 20 MIN: CPT | Mod: 95 | Performed by: PSYCHIATRY & NEUROLOGY

## 2020-06-26 NOTE — ASSESSMENT & PLAN NOTE
The patient has a benign essential tremor.  She does not have parkinsonism.  For now we will take a wait and see approach.  Her tremor is a mild nuisance which does not negatively impact her activities of daily living or quality of life in any meaningful way.

## 2020-06-26 NOTE — PROGRESS NOTES
Verification of Patient Location:  The patient affirms they are currently located in the following state: [place]    Request for Consent:   Video Encounter   Oziel, my name is Renato Diaz MD.  Before we proceed, can you please verify your identification by telling me your full name and date of birth?  Can you tell me who is in the room with you?    You and I are about to have a telemedicine check-in or visit because you have requested it.  This is a live video-conference.  I am a real person, speaking to you in real time.  There is no one else with me on the video-conference.  However, when we use ([employer], etc) it is important for you to know that the video-conference may not be secure or private.  I am not recording this conversation and I am asking you not to record it.  This telemedicine visit will be billed to your health insurance or you, if you are self-insured.  You understand you will be responsible for any copayments or coinsurances that apply to your telemedicine visit.  Before starting our telemedicine visit, I am required to get your consent for this virtual check-in or visit by telemedicine. Do you consent?    Patient Response to Request for Consent:  Yes      Visit Documentation:  Subjective   This virtual telemedicine visit was performed through face time    Patient ID: Yisel Lyle is a 70 y.o. female.  1950      HPI   I previously saw the patient in December.  As you know she had a transient event of uncertain etiology.  A comprehensive work-up was unrevealing and she was on best medical management.  A tremor was a mild nuisance and we decided to take a wait and see approach.    Since her last visit, overall the patient reported that she has been doing exceptionally well.  She has had no episodes of transient or static neurologic dysfunction to suggest an aura, seizure, TIA or stroke.  Both physically and cognitively she has been feeling well.  She is extremely active  walking between 4 to 6 miles per day.    The patient reported that her biggest difficulty is her sleep.  She only gets about 2 to 3 hours of sleep.  She has sleep apnea and uses a CPAP however struggles to sleep and does have excessive daytime fatigue.  She has asthma with some dyspnea on exertion.  She continues to have a mild tremor involving the left hand.  This is a high-frequency low amplitude tremor which is really only present with posture and action and absent at rest.  Her tremor is a mild nuisance which does not negatively impact her activities of daily living or quality of life.    The patient has rare headaches which she believes are related to allergies.  She is seeing an allergist and may require shots.  She is eating well and her mood is happy.  There were no symptoms to suggest increased intracranial pressure, meningitis or systemic illness.    The following have been reviewed and updated as appropriate in this visit:  Allergies  Meds  Problems       Review of Systems  A comprehensive review of systems was unremarkable    Assessment/Plan   Diagnoses and all orders for this visit:    Obstructive sleep apnea syndrome (Primary)  Assessment & Plan:  The patient will need to follow closely with her sleep doctor.      Tremor  Assessment & Plan:  The patient has a benign essential tremor.  She does not have parkinsonism.  For now we will take a wait and see approach.  Her tremor is a mild nuisance which does not negatively impact her activities of daily living or quality of life in any meaningful way.      TIA (transient ischemic attack)  Assessment & Plan:  It is unclear if the patient had a TIA however a comprehensive vascular work-up was unrevealing.  She is on best medical management for stroke and vascular prophylaxis.  She understands the importance of presenting to the emergency room immediately in the future if she has any new events.        Time Spent in Medical Discussion During This  Encounter:      16 minutes

## 2020-06-26 NOTE — ASSESSMENT & PLAN NOTE
It is unclear if the patient had a TIA however a comprehensive vascular work-up was unrevealing.  She is on best medical management for stroke and vascular prophylaxis.  She understands the importance of presenting to the emergency room immediately in the future if she has any new events.

## 2020-07-22 NOTE — PROGRESS NOTES
Emeterio Khalil MD, [facility]    Director, Clinical Cardiology-[facility] and  [facility]    The Jimbo Thompson III Chair in Innovative Cardiology    Clinical Associate Professor of Medicine  The General acute hospital of  [facility]  [facility] [place]  The Heart Pavilion  [address]  [address]  [place] [address]    TEL  [phone]  FAX: [phone]  EMAIL: [email or web address]   July 23, 2020    Aylin Madden MD  [address]  [place] [address]     Renaot Diaz MD  Neurology  [facility] [place]    Conrado Boss MD  [facility] [place]    Nik Brennan MD  Pulmonary and Critical Care Medicine  [facility], [address]  [facility]  Fax: [phone]      Yisel Fernandesnberg  1950      Dear Colleagues,      I saw Yisel Lyle in my office today on July 23, 2020.    She is a 70 y.o. female with a history of cervical arthropathy status post anterior cervical discectomy and fusion complicated by vocal cord paralysis, laryngeal spasm and dyspnea, obstructive sleep apnea on CPAP therapy, a probable TIA in November 2019, mild coronary calcifications, and a mildly enlarged ascending aorta (4 cm; 6/19) who presents today for her 6-month follow-up visit.    She has done well since I last saw her with no new neurologic symptoms.  She had experienced an episode of bronchospasm one evening after forgetting to use her CPAP therapy.    Her rosuvastatin dose was reduced from 20 mg to 10 mg a day for unclear reasons.  She has been experiencing some calf discomfort bilaterally perhaps suggestion of statin related symptoms.  These complaints are minimal and she is willing to tolerate them.    Her exercise consists of walking for 2 miles three times a day weather permitting. She has not walked much in the past two weeks because of the heat.    She has lost 15 pounds since her  "last visit.      ALLERGIES:  Allergies   Allergen Reactions   • Iodine And Iodide Containing Products Anaphylaxis     Contrast dye - welts, and throat closed   • Anesthesia S/I-40 (Propofol) [Propofol]      Bottom half of body went numb   • Venom-Honey Bee Angioedema        I have reviewed the problem list, allergies, medications and social history, and they are up to date in the electronic record as of the current encounter.     ROS: The remainder of the review of systems is otherwise negative in detail for significant complaints.    No changes in her medications have been made since the last visit.    PHYSICAL EXAM:  Visit Vitals  /80   Pulse (!) 58   Resp 16   Ht 1.727 m (5' 8\")   Wt 64 kg (141 lb)   BMI 21.44 kg/m²     General: well-developed, overweight, appears well, no acute distress  HEENT: sclera anicteric, conjunctiva pink, no xanthelasma, neck supple, no thyromegaly  Chest: Expiratory stridor with deep breathing, clear to auscultation, symmetrical expansion, no scoliosis   Heart: Apical impulse normally situated, regular rhythm, normal S1, normal S2, no gallops, no murmur  JVP: normal jugular venous pressure, negative AJR  Vascular: carotid pulses: intact bilaterally, no bruit, peripheral pulses: intact bilaterally  Abd: soft, non-tender, no hepatosplenomegaly, abdominal aorta not palpable  Ext: no edema, no clubbing, no cyanosis  Skin: warm, dry, no ecchymoses  Neuro: alert, oriented x 3, normal muscle strength  Psychiatric: normal affect, normal judgment, normal insight and normal memory    LABS:  Laboratory studies obtained on 2/8/20 revealed:  Cholesterol 142, triglycerides 83, HDL 55, LDL 70, non-HDL 87, comprehensive metabolic profile normal, TSH normal.       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  I suspect additional " nerve damage may have developed resulting in dysfunction of her upper airway musculature and producing her exertional dyspnea.  Her fatigue is likely due to her sleep apnea which is now being treated.     Coronary calcification incidentally noted on CT scan 6/19:  Coronary calcification suggest the presence of coronary atherosclerosis.  This is especially relevant with her family history of coronary on her mother side of the family.  She has had a negative stress test for ischemia in October 2018.    Hypercholesterolemia:  Her LDL should be at least less than 70 and perhaps 55 in the setting of her TIA and evidence of small vessel intracranial disease.    Abnormal ECG:  She has low voltage on her electrocardiogram but no findings on her echocardiogram to support an infiltrative process, pericardial effusion and no findings on her CT scan to explain the low voltage.    Mildly dilated ascending aorta (4 cm '19):  She should have a follow-up CT scan next year and then periodically.    TIA 11/19:  Blood pressure in target range.  LDL management as above.    No changes were made in her regimen today.  Listed below you will find the regimen that she will be requested to continue:      Current Outpatient Medications:   •  acetaminophen (TYLENOL) 500 mg tablet, Take 1,000 mg by mouth daily as needed for mild pain., Disp: , Rfl:   •  albuterol HFA (VENTOLIN HFA) 90 mcg/actuation inhaler, 2 puffs daily and an additional dose as needed for wheezing or shortness of breath.  , Disp: , Rfl:   •  aspirin 81 mg enteric coated tablet, Take 81 mg by mouth daily., Disp: , Rfl:   •  BROVANA 15 mcg/2 mL nebulizer solution, 2 (two) times a day., Disp: , Rfl:   •  famotidine (PEPCID) 20 mg tablet, Take 20 mg by mouth daily., Disp: , Rfl:   •  fexofenadine (ALLEGRA) 180 mg tablet, Take 180 mg by mouth daily.  , Disp: , Rfl:   •  fluticasone propionate (FLONASE) 50 mcg/actuation nasal spray, Administer 2 sprays into each nostril daily.,  Disp: , Rfl:   •  QVAR REDIHALER 80 mcg/actuation HFA aerosol breath activated, Inhale 2 puffs daily.  , Disp: , Rfl:   •  rosuvastatin (CRESTOR) 20 mg tablet, Take 10 mg by mouth daily.  , Disp: 90 tablet, Rfl: 3    I will be retiring at the end of 2020. I have suggested that she continue her cardiologic care with Albert Dale MD, one of my colleagues in the [facility].  A follow-up appointment will be arranged for her to see him in about 6 months.    I have sincerely appreciated the opportunity to participate in the care of your patients. Please do not hesitate to contact me if any questions or problems arise until my correction in mid December 2020.         With best wishes and warmest personal regards.      Sincerely,    Emeterio Khalil MD, [facility]    This document was generated utilizing voice recognition technology. A reasonable attempt at proofreading has been made to minimize errors but please excuse any typographical errors which may be present. Please call with any questions.

## 2020-07-23 ENCOUNTER — OFFICE VISIT (OUTPATIENT)
Dept: CARDIOLOGY | Facility: CLINIC | Age: 70
End: 2020-07-23
Payer: MEDICARE

## 2020-07-23 VITALS
RESPIRATION RATE: 16 BRPM | BODY MASS INDEX: 21.37 KG/M2 | SYSTOLIC BLOOD PRESSURE: 116 MMHG | WEIGHT: 141 LBS | HEART RATE: 58 BPM | HEIGHT: 68 IN | DIASTOLIC BLOOD PRESSURE: 80 MMHG

## 2020-07-23 DIAGNOSIS — I77.89 ASCENDING AORTA ENLARGEMENT (CMS/HCC): Chronic | ICD-10-CM

## 2020-07-23 DIAGNOSIS — E78.5 DYSLIPIDEMIA: Chronic | ICD-10-CM

## 2020-07-23 DIAGNOSIS — J38.00 VOCAL CORD PARALYSIS: Chronic | ICD-10-CM

## 2020-07-23 DIAGNOSIS — I25.10 CORONARY ARTERY CALCIFICATION SEEN ON CT SCAN: Primary | Chronic | ICD-10-CM

## 2020-07-23 DIAGNOSIS — G45.9 TIA (TRANSIENT ISCHEMIC ATTACK): Chronic | ICD-10-CM

## 2020-07-23 DIAGNOSIS — I63.81 MULTIPLE LACUNAR INFARCTS (CMS/HCC): Chronic | ICD-10-CM

## 2020-07-23 PROBLEM — M85.80 OSTEOPENIA: Status: ACTIVE | Noted: 2020-07-23

## 2020-07-23 PROCEDURE — 93000 ELECTROCARDIOGRAM COMPLETE: CPT | Performed by: INTERNAL MEDICINE

## 2020-07-23 PROCEDURE — 99214 OFFICE O/P EST MOD 30 MIN: CPT | Performed by: INTERNAL MEDICINE

## 2020-07-23 RX ORDER — BECLOMETHASONE DIPROPIONATE HFA 80 UG/1
2 AEROSOL, METERED RESPIRATORY (INHALATION) DAILY
COMMUNITY
Start: 2020-07-03 | End: 2021-07-13

## 2020-07-23 RX ORDER — ALBUTEROL SULFATE 90 UG/1
2 INHALANT RESPIRATORY (INHALATION) DAILY
COMMUNITY
Start: 2020-07-01

## 2020-07-23 RX ORDER — PREDNISONE 5 MG/1
TABLET ORAL DAILY PRN
COMMUNITY
Start: 2020-05-22 | End: 2020-07-23 | Stop reason: ALTCHOICE

## 2020-07-23 RX ORDER — ARFORMOTEROL TARTRATE 15 UG/2ML
SOLUTION RESPIRATORY (INHALATION) 2 TIMES DAILY
COMMUNITY
Start: 2020-07-03 | End: 2023-03-07

## 2020-07-23 RX ORDER — OMEGA-3-ACID ETHYL ESTERS 1 G/1
1 CAPSULE, LIQUID FILLED ORAL 2 TIMES DAILY
COMMUNITY
End: 2020-07-23 | Stop reason: ALTCHOICE

## 2020-07-23 RX ORDER — NITROFURANTOIN 25; 75 MG/1; MG/1
100 CAPSULE ORAL
COMMUNITY
Start: 2020-04-28 | End: 2020-07-23 | Stop reason: ALTCHOICE

## 2020-07-23 ASSESSMENT — ENCOUNTER SYMPTOMS
APNEA: 0
SHORTNESS OF BREATH: 1
DIZZINESS: 0
FEVER: 0
LIGHT-HEADEDNESS: 1
CHILLS: 0
ABDOMINAL PAIN: 0
HEADACHES: 0
HEMATURIA: 0
FATIGUE: 0
COUGH: 0
WHEEZING: 0
ANAL BLEEDING: 0
BRUISES/BLEEDS EASILY: 1
MYALGIAS: 1
PALPITATIONS: 0
SPEECH DIFFICULTY: 0
BLOOD IN STOOL: 0

## 2020-07-23 NOTE — PROGRESS NOTES
"Subjective      Patient ID: Yisel Lyle is a 70 y.o. female.  1950      Her exercise consists of walking for 2 miles three times a day weather permitting. She has not walked much in the past two weeks because of the heat.      The following have been reviewed and updated as appropriate in this visit:  Tobacco  Allergies  Meds  Surg Hx  Fam Hx       Review of Systems   Constitutional: Negative for chills, fatigue and fever.        Weight decreased 15 lbs since her last visit   HENT: Negative for nosebleeds.    Respiratory: Positive for shortness of breath (1 episode while not using CPAP last week - also developed laryngeal spasm, resolved within 10 min). Negative for apnea (using CPAP 4-6 hours nightly), cough and wheezing.    Cardiovascular: Positive for leg swelling (very occasional in ankles as day proogresses, resolves with elevation). Negative for chest pain and palpitations.        No orthopnea or PND   Gastrointestinal: Negative for abdominal pain, anal bleeding and blood in stool.   Genitourinary: Negative for hematuria.   Musculoskeletal: Positive for myalgias (pain in  both calves and occasional cramping mostlly at night).   Neurological: Positive for light-headedness (after inhaler use only, resolves in seconds). Negative for dizziness, syncope, speech difficulty and headaches.   Hematological: Bruises/bleeds easily.       Objective     Vitals:    07/23/20 1106   Pulse: (!) 58   Resp: 16   Weight: 64 kg (141 lb)   Height: 1.727 m (5' 8\")     Body mass index is 21.44 kg/m².    Physical Exam    Assessment/Plan   Diagnoses and all orders for this visit:    Coronary artery calcification seen on CT scan (Primary)  -     ECG 12 LEAD-OFFICE PERFORMED    Multiple lacunar infarcts (CMS/HCC)  -     ECG 12 LEAD-OFFICE PERFORMED    TIA (transient ischemic attack)  -     ECG 12 LEAD-OFFICE PERFORMED    Dyslipidemia  -     ECG 12 LEAD-OFFICE PERFORMED    Vocal cord paralysis  -     ECG 12 LEAD-OFFICE " PERFORMED    Ascending aorta enlargement (CMS/HCC)  -     ECG 12 LEAD-OFFICE PERFORMED

## 2020-07-23 NOTE — LETTER
Emeterio Khalil MD, [facility]    Director, Clinical Cardiology-[facility] and  [facility]    The Jmibo Thompson III Chair in Innovative Cardiology    Clinical Associate Professor of Medicine  [facility]  [facility]  [facility]  The Heart Pavilion  [address]  [address]  [place] [address]    TEL  [phone]  FAX: [phone]  EMAIL: [email or web address]   July 23, 2020    Aylin Madden MD  [address]  [place] [address]     Renato Diaz MD  Neurology  [facility]    Conrado Boss MD  [facility]    Nik Brennan MD  Pulmonary and Critical Care Medicine  [facility]  [facility]  Fax: [phone]      Yisel Fernandesnberg  1950      Dear Colleagues,      I saw Yisel Lyle in my office today on July 23, 2020.    She is a 70 y.o. female with a history of cervical arthropathy status post anterior cervical discectomy and fusion complicated by vocal cord paralysis, laryngeal spasm and dyspnea, obstructive sleep apnea on CPAP therapy, a probable TIA in November 2019, mild coronary calcifications, and a mildly enlarged ascending aorta (4 cm; 6/19) who presents today for her 6-month follow-up visit.    She has done well since I last saw her with no new neurologic symptoms.  She had experienced an episode of bronchospasm one evening after forgetting to use her CPAP therapy.    Her rosuvastatin dose was reduced from 20 mg to 10 mg a day for unclear reasons.  She has been experiencing some calf discomfort bilaterally perhaps suggestion of statin related symptoms.  These complaints are minimal and she is willing to tolerate them.    Her exercise consists of walking for 2 miles three times a day weather permitting. She has not walked much in the past two weeks because of the heat.    She has lost 15 pounds since her  "last visit.      ALLERGIES:  Allergies   Allergen Reactions   • Iodine And Iodide Containing Products Anaphylaxis     Contrast dye - welts, and throat closed   • Anesthesia S/I-40 (Propofol) [Propofol]      Bottom half of body went numb   • Venom-Honey Bee Angioedema        I have reviewed the problem list, allergies, medications and social history, and they are up to date in the electronic record as of the current encounter.     ROS: The remainder of the review of systems is otherwise negative in detail for significant complaints.    No changes in her medications have been made since the last visit.    PHYSICAL EXAM:  Visit Vitals  /80   Pulse (!) 58   Resp 16   Ht 1.727 m (5' 8\")   Wt 64 kg (141 lb)   BMI 21.44 kg/m²     General: well-developed, overweight, appears well, no acute distress  HEENT: sclera anicteric, conjunctiva pink, no xanthelasma, neck supple, no thyromegaly  Chest: Expiratory stridor with deep breathing, clear to auscultation, symmetrical expansion, no scoliosis   Heart: Apical impulse normally situated, regular rhythm, normal S1, normal S2, no gallops, no murmur  JVP: normal jugular venous pressure, negative AJR  Vascular: carotid pulses: intact bilaterally, no bruit, peripheral pulses: intact bilaterally  Abd: soft, non-tender, no hepatosplenomegaly, abdominal aorta not palpable  Ext: no edema, no clubbing, no cyanosis  Skin: warm, dry, no ecchymoses  Neuro: alert, oriented x 3, normal muscle strength  Psychiatric: normal affect, normal judgment, normal insight and normal memory    LABS:  Laboratory studies obtained on 2/8/20 revealed:  Cholesterol 142, triglycerides 83, HDL 55, LDL 70, non-HDL 87, comprehensive metabolic profile normal, TSH normal.       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  I suspect additional " nerve damage may have developed resulting in dysfunction of her upper airway musculature and producing her exertional dyspnea.  Her fatigue is likely due to her sleep apnea which is now being treated.     Coronary calcification incidentally noted on CT scan 6/19:  Coronary calcification suggest the presence of coronary atherosclerosis.  This is especially relevant with her family history of coronary on her mother side of the family.  She has had a negative stress test for ischemia in October 2018.    Hypercholesterolemia:  Her LDL should be at least less than 70 and perhaps 55 in the setting of her TIA and evidence of small vessel intracranial disease.    Abnormal ECG:  She has low voltage on her electrocardiogram but no findings on her echocardiogram to support an infiltrative process, pericardial effusion and no findings on her CT scan to explain the low voltage.    Mildly dilated ascending aorta (4 cm '19):  She should have a follow-up CT scan next year and then periodically.    TIA 11/19:  Blood pressure in target range.  LDL management as above.    No changes were made in her regimen today.  Listed below you will find the regimen that she will be requested to continue:      Current Outpatient Medications:   •  acetaminophen (TYLENOL) 500 mg tablet, Take 1,000 mg by mouth daily as needed for mild pain., Disp: , Rfl:   •  albuterol HFA (VENTOLIN HFA) 90 mcg/actuation inhaler, 2 puffs daily and an additional dose as needed for wheezing or shortness of breath.  , Disp: , Rfl:   •  aspirin 81 mg enteric coated tablet, Take 81 mg by mouth daily., Disp: , Rfl:   •  BROVANA 15 mcg/2 mL nebulizer solution, 2 (two) times a day., Disp: , Rfl:   •  famotidine (PEPCID) 20 mg tablet, Take 20 mg by mouth daily., Disp: , Rfl:   •  fexofenadine (ALLEGRA) 180 mg tablet, Take 180 mg by mouth daily.  , Disp: , Rfl:   •  fluticasone propionate (FLONASE) 50 mcg/actuation nasal spray, Administer 2 sprays into each nostril daily.,  Disp: , Rfl:   •  QVAR REDIHALER 80 mcg/actuation HFA aerosol breath activated, Inhale 2 puffs daily.  , Disp: , Rfl:   •  rosuvastatin (CRESTOR) 20 mg tablet, Take 10 mg by mouth daily.  , Disp: 90 tablet, Rfl: 3    I will be retiring at the end of 2020. I have suggested that she continue her cardiologic care with Albert Dale MD, one of my colleagues in the [facility].  A follow-up appointment will be arranged for her to see him in about 6 months.    I have sincerely appreciated the opportunity to participate in the care of your patients. Please do not hesitate to contact me if any questions or problems arise until my FPC in mid December 2020.         With best wishes and warmest personal regards.      Sincerely,    Emeterio Khalil MD, [facility]    This document was generated utilizing voice recognition technology. A reasonable attempt at proofreading has been made to minimize errors but please excuse any typographical errors which may be present. Please call with any questions.

## 2020-10-28 RX ORDER — ASPIRIN 81 MG/1
81 TABLET ORAL DAILY
COMMUNITY
End: 2021-01-13

## 2020-10-28 RX ORDER — ACETAMINOPHEN 500 MG
2000 TABLET ORAL DAILY
COMMUNITY

## 2020-11-02 ENCOUNTER — OFFICE VISIT (OUTPATIENT)
Dept: UROGYNECOLOGY | Facility: CLINIC | Age: 70
End: 2020-11-02
Payer: MEDICARE

## 2020-11-02 VITALS
BODY MASS INDEX: 26.81 KG/M2 | DIASTOLIC BLOOD PRESSURE: 82 MMHG | WEIGHT: 142 LBS | HEIGHT: 61 IN | SYSTOLIC BLOOD PRESSURE: 123 MMHG

## 2020-11-02 DIAGNOSIS — I63.81 MULTIPLE LACUNAR INFARCTS (CMS/HCC): ICD-10-CM

## 2020-11-02 DIAGNOSIS — N30.00 ACUTE CYSTITIS WITHOUT HEMATURIA: ICD-10-CM

## 2020-11-02 DIAGNOSIS — N81.2 UTEROVAGINAL PROLAPSE, INCOMPLETE: Primary | ICD-10-CM

## 2020-11-02 DIAGNOSIS — N39.3 STRESS INCONTINENCE: ICD-10-CM

## 2020-11-02 LAB
BACTERIA URNS QL MICRO: 3 /HPF
BILIRUB UR QL STRIP.AUTO: NEGATIVE MG/DL
CLARITY UR REFRACT.AUTO: ABNORMAL
COLOR UR AUTO: YELLOW
GLUCOSE UR STRIP.AUTO-MCNC: NEGATIVE MG/DL
HGB UR QL STRIP.AUTO: NEGATIVE
HYALINE CASTS #/AREA URNS LPF: ABNORMAL /LPF
KETONES UR STRIP.AUTO-MCNC: NEGATIVE MG/DL
LEUKOCYTE ESTERASE UR QL STRIP.AUTO: 1
NITRITE UR QL STRIP.AUTO: POSITIVE
PH UR STRIP.AUTO: 6 [PH]
PROT UR QL STRIP.AUTO: NEGATIVE
RBC #/AREA URNS HPF: ABNORMAL /HPF
SP GR UR REFRACT.AUTO: 1.02
SQUAMOUS URNS QL MICRO: 1 /HPF
UROBILINOGEN UR STRIP-ACNC: 0.2 EU/DL
WBC #/AREA URNS HPF: ABNORMAL /HPF

## 2020-11-02 PROCEDURE — 81001 URINALYSIS AUTO W/SCOPE: CPT | Performed by: OBSTETRICS & GYNECOLOGY

## 2020-11-02 PROCEDURE — 99204 OFFICE O/P NEW MOD 45 MIN: CPT | Mod: 25 | Performed by: OBSTETRICS & GYNECOLOGY

## 2020-11-02 PROCEDURE — 87077 CULTURE AEROBIC IDENTIFY: CPT | Performed by: OBSTETRICS & GYNECOLOGY

## 2020-11-02 PROCEDURE — 51701 INSERT BLADDER CATHETER: CPT | Performed by: OBSTETRICS & GYNECOLOGY

## 2020-11-02 ASSESSMENT — ENCOUNTER SYMPTOMS
TREMORS: 1
DYSURIA: 1
DIFFICULTY URINATING: 1
RECTAL PAIN: 1

## 2020-11-02 NOTE — LETTER
Dear Dr. Madden:    I had the pleasure of seeing Yisel Lyle today at [facility] at your consultative request.  As you may recall, Ms. Lyle is a 70 year-old who presented today with bothersome pelvic pressure.    On my examination, she has a stage IIIA uterovaginal prolapse, with the anterior vaginal wall 2 cm beyond the vaginal opening, her cervix 4 cm above the hymen, and the posterior vaginal wall 1 cm above the hymen. She is considering surgery but wants to minimize the invasiveness of the procedure. We therefore agreed that a Chesnee-Fothergill uterine suspension with combined anterior and posterior colporrhaphy would nicely reduce her prolapse without requiring deep anesthesia or abdominal incisions. She also reports stress urinary incontinence with coughing or sneezing, so I offered a concomitant synthetic mesh midurethral sling at the time of her surgery - if she opts for this, she will return to my office for urodynamic testing first to ensure this is a safe and reasonable plan.    Thank you again for your kind consultation.  I will continue to keep you updated regarding her progress.  If you have any questions or concerns, please do not hesitate to call.      Dimitris Marcial MD, PhD  Female Pelvic Medicine & Reconstructive Surgery  [facility]ro[facility]necology [facility]  [facility]  Phone: [phone]  Fax: [phone]  URL: [email or web address]

## 2020-11-02 NOTE — ASSESSMENT & PLAN NOTE
Ms. Lyle has stress urinary incontinence by history. Her post void residual is within normal limits, although slightly higher than I would like to see. Her symptoms occur infrequently and aren't having a significantly negative impact on her quality of life, although she doesn't particularly love having the leakage. She has already tried behavioral modifications and pelvic exercises, without significant improvement. We discussed expectant management, conservative management, weight loss, urethral bulking, and sling surgery. Patient education was provided regarding surgical and non-surgical options for stress urinary incontinence, including risks and complications.    Plan: Return for urodynamic testing if she opts for surgery including a sling. We discussed the options of concomitant sling or a staged procedure, but she expressed a preference for a concomitant surgery if she is going to undergo prolapse reduction. Ms. Lyle was provided handouts from [employer] about midurethral slings and urodynamic testing for more information.

## 2020-11-02 NOTE — PATIENT INSTRUCTIONS
If you have any problems or concerns, call our office at [phone].  If you think you are having a medical emergency, please call [phone].     You have a stage III uterovaginal prolapse, as well as stress urinary incontinence.

## 2020-11-02 NOTE — ASSESSMENT & PLAN NOTE
Ms. Lyle has stage III A uterovaginal prolapse. I discussed multiple options, including expectant management, conservative management with pessary and/or pelvic floor PT, and surgical management. She is leaning towards surgery as she is bothered by the pressure and discomfort associated with the prolapse. I therefore discussed transabdominal and transvaginal, as well as native-tissue vs mesh-augmented surgical approaches. She is most interested in a native-tissue transvaginal approach, especially something that could be done with lighter anesthesia (such as a spinal anesthetic) as she had a bad experience with propofol. I recommended a [facility] uterine suspension with combined anterior and posterior colporrhaphy, and I gave her the [facility] handout about this surgery for more information. She will call to let me know if she wants to schedule the surgery after she has had time to read and think about her options.

## 2020-11-02 NOTE — PROGRESS NOTES
"Ms. Lyle is seen in consultation regarding possible prolapse at the request of Dr. Madden.     CC: \"Pains when urinating and in vaginal area\"    HPI: This is a 70 y.o.  who presents to discuss possible prolapse. She describes a 2 month history of pelvic and vaginal pain. She mostly complains that she feels something is dropping down and that causes a lot of pressure and burning    Urinary Symptoms: She describes stress urinary incontinence with coughing and sneezing. She has occasional urgency urinary incontinence and \"key-in-lock\" incontinence. She uses 2 thick pads per day for protection. She denies nocturnal enuresis and awakens 2 times per night to void.  She reports recently developing recurrent or frequent urinary tract infections, had 3 or 4 urinary tract infections in the last year, reports dysuria, and denies hematuria.    1. How often do you leak urine? several times a day (4)  2. How much urine do you usually leak? a small amount (2)  3. How much does leaking urine interfere with your everyday life? 5  4. When does urine leak (pick all that apply)? leaks before you can get to the toilet, leaks when you cough or sneeze, leaks when you are physically active/exercising and leaks for no obvious reason      Prolapse Symptoms: She reports a sensation of something dropping, but she can't feel a bulge outside the vaginal opening.    Bowel Symptoms: She denies defecatory dysfunction - she used to have constipation frequently, but now that seems to have resolved. She denies splinting.  She denies anal incontinence.    Sexual Function: She is not currently sexually active.     She  has a past medical history of Abnormal ECG, Ascending aorta enlargement (CMS/HCC) (2019), Asthma, Coronary artery calcification seen on CT scan (2019), GERD (gastroesophageal reflux disease), Obstructive sleep apnea syndrome (1/15/2020), Opacity of lung on imaging study (2019), Osteopenia (2020), Shortness " of breath (7/17/2019), Stroke (CMS/HCC), and Vocal cord paralysis.    She  has a past surgical history that includes Tonsillectomy (1976); Reduction mammaplasty (Bilateral, 1988); Colonoscopy w/ polypectomy; Cataract extraction w/  intraocular lens implant (Left, 2017); Eye surgery (Left, 2017); Anterior cervical discectomy w/ fusion (10/2018); and Vocal cord injection (Right, 03/2019).      Current Outpatient Medications:   •  albuterol HFA (VENTOLIN HFA) 90 mcg/actuation inhaler, 2 puffs daily and an additional dose as needed for wheezing or shortness of breath.  , Disp: , Rfl:   •  aspirin 81 mg enteric coated tablet, Take 81 mg by mouth daily., Disp: , Rfl:   •  aspirin 81 mg enteric coated tablet, Take 81 mg by mouth daily., Disp: , Rfl:   •  beclomethasone (QVAR) 40 mcg/actuation inhaler, Inhale 2 puffs 2 (two) times a day. Rinse mouth with water after use to reduce aftertaste and incidence of candidiasis.  Do not swallow. For patients not on a ventilator, a spacer is recommended to be used with this medication/inhaler., Disp: , Rfl:   •  cholecalciferol, vitamin D3, 50 mcg (2,000 unit) capsule, Take 2,000 Units by mouth daily. 2, Disp: , Rfl:   •  famotidine (PEPCID) 20 mg tablet, Take 20 mg by mouth daily., Disp: , Rfl:   •  fexofenadine (ALLEGRA) 180 mg tablet, Take 180 mg by mouth daily.  , Disp: , Rfl:   •  fluticasone propionate (FLONASE) 50 mcg/actuation nasal spray, Administer 2 sprays into each nostril daily., Disp: , Rfl:   •  QVAR REDIHALER 80 mcg/actuation HFA aerosol breath activated, Inhale 2 puffs daily.  , Disp: , Rfl:   •  rosuvastatin (CRESTOR) 20 mg tablet, Take 10 mg by mouth daily.  , Disp: 90 tablet, Rfl: 3  •  acetaminophen (TYLENOL) 500 mg tablet, Take 1,000 mg by mouth daily as needed for mild pain., Disp: , Rfl:   •  BROVANA 15 mcg/2 mL nebulizer solution, 2 (two) times a day., Disp: , Rfl:     She is allergic to iodine and iodide containing products; anesthesia s/i-40 (propofol)  [propofol]; and venom-honey bee.    Her social history is not contributory.    Her family history is not contributory.    Review of Systems    Review of Systems   Gastrointestinal: Positive for change in stool and rectal pain.   Genitourinary: Positive for difficulty urinating, dysuria, nocturia, pelvic pain, urgency and vaginal pain.   Neurological: Positive for tremors.   All other systems reviewed and are negative.      Physical Exam    Vitals:    11/02/20 1425   BP: 123/82     Weight: 64.4 kg (142 lb)   Body mass index is 26.83 kg/m².   Constitutional: She is oriented to person, place, and time and well-developed, well-nourished, and in no distress.  Psychiatric: Mood, memory, affect and judgment normal.   Neurological: Pleasant and oriented.   Neck: Normal in appearance.   Back: No tenderness over the spine or CVA tenderness.  Cardiovascular: No JVD.  No lower extremity edema.  Pulmonary/Chest: Effort normal. No respiratory distress.   Skin: Skin is warm and dry. No rash noted. No pallor.   Abd: Soft, non tender.   No surgical scars visualized.    Pelvic Exam (a female chaperone was present during the entire pelvic examination)    Normal external genitalia, including clitoris, urethral meatus and perineal body.  KAREN was not demonstrable with cough or Valsalva in the lithotomy position, approximately 15 minutes after her last spontaneous void.    PROCEDURE NOTE FOR POST-VOID RESIDUAL: (27004)  The urethra was prepped, and a lubricated 12 [place] catheter was inserted to collect a post void residual.  The procedure was well tolerated by the patient  The PVR amount is 110 mL.     POC UA was ordered: positive for heme, positive for LE, positive for nitrites detected.  The catheterized specimen was sent for urinalysis and culture.    Speculum examination: Vaginal epithelium was atrophic but without abnormal lesions.  The cervix was normal in appearance.  Bimanual exam: The uterus was midline, mobile and nontener.   There were no masses or tenderness in the pelvis/adnexal region.  Rectal exam: Normally-situated anus.  LISA deferred.    POP-Q:  Physical Exam  Genitourinary:   POP-Q measurements were:      Aa: +2, Ba: +2, C: -4     gH: 3/4, pB: 4, TVL: 10     Ap: -1, Bp: -1, D: -6           Assessment/Plan     Acute cystitis without hematuria  A point-of-care urinalysis on a clean-catch specimen showed the presence of leukocyte esterase, nitrites and heme. I therefore ordered a urinalysis and urine culture on a catheterized specimen. I will follow-up on the result of her urine studies, and will offer antibiotics and/or further evaluation, if appropriate.      Uterovaginal prolapse, incomplete  Ms. Lyle has stage III A uterovaginal prolapse. I discussed multiple options, including expectant management, conservative management with pessary and/or pelvic floor PT, and surgical management. She is leaning towards surgery as she is bothered by the pressure and discomfort associated with the prolapse. I therefore discussed transabdominal and transvaginal, as well as native-tissue vs mesh-augmented surgical approaches. She is most interested in a native-tissue transvaginal approach, especially something that could be done with lighter anesthesia (such as a spinal anesthetic) as she had a bad experience with propofol. I recommended a [facility] uterine suspension with combined anterior and posterior colporrhaphy, and I gave her the [facility] handout about this surgery for more information. She will call to let me know if she wants to schedule the surgery after she has had time to read and think about her options.    Stress incontinence  Ms. Lyle has stress urinary incontinence by history. Her post void residual is within normal limits, although slightly higher than I would like to see. Her symptoms occur infrequently and aren't having a significantly negative impact on her quality of life, although she  doesn't particularly love having the leakage. She has already tried behavioral modifications and pelvic exercises, without significant improvement. We discussed expectant management, conservative management, weight loss, urethral bulking, and sling surgery. Patient education was provided regarding surgical and non-surgical options for stress urinary incontinence, including risks and complications.    Plan: Return for urodynamic testing if she opts for surgery including a sling. We discussed the options of concomitant sling or a staged procedure, but she expressed a preference for a concomitant surgery if she is going to undergo prolapse reduction. Ms. Lyle was provided handouts from [facility] about midurethral slings and urodynamic testing for more information.     Multiple lacunar infarcts (CMS/HCC)  Preoperative cardiac clearance will be needed if she opts for surgery.      Return if symptoms worsen or fail to improve.       Dimitris Marcial MD PhD

## 2020-11-02 NOTE — ASSESSMENT & PLAN NOTE
A point-of-care urinalysis on a clean-catch specimen showed the presence of leukocyte esterase, nitrites and heme. I therefore ordered a urinalysis and urine culture on a catheterized specimen. I will follow-up on the result of her urine studies, and will offer antibiotics and/or further evaluation, if appropriate.

## 2020-11-04 ENCOUNTER — TELEPHONE (OUTPATIENT)
Dept: UROGYNECOLOGY | Facility: CLINIC | Age: 70
End: 2020-11-04

## 2020-11-04 LAB
BACTERIA UR CULT: ABNORMAL
BACTERIA UR CULT: ABNORMAL

## 2020-11-04 RX ORDER — NITROFURANTOIN 25; 75 MG/1; MG/1
100 CAPSULE ORAL 2 TIMES DAILY
Qty: 10 CAPSULE | Refills: 0 | Status: SHIPPED | OUTPATIENT
Start: 2020-11-04 | End: 2020-11-09

## 2020-11-04 NOTE — TELEPHONE ENCOUNTER
I called to let Ms. Lyle know that her recent urine culture shows she has a UTI.  I sent a prescription for MacroBID 100 mg, Si tab PO BID x 5 days to her pharmacy ([facility] in [place]).    Dimitris Marcial MD PhD

## 2021-01-13 ENCOUNTER — OFFICE VISIT (OUTPATIENT)
Dept: CARDIOLOGY | Facility: CLINIC | Age: 71
End: 2021-01-13
Payer: MEDICARE

## 2021-01-13 VITALS
OXYGEN SATURATION: 98 % | HEART RATE: 70 BPM | DIASTOLIC BLOOD PRESSURE: 68 MMHG | WEIGHT: 144 LBS | HEIGHT: 61 IN | BODY MASS INDEX: 27.19 KG/M2 | SYSTOLIC BLOOD PRESSURE: 110 MMHG

## 2021-01-13 DIAGNOSIS — I25.10 CORONARY ARTERY CALCIFICATION SEEN ON CT SCAN: Primary | ICD-10-CM

## 2021-01-13 DIAGNOSIS — I71.20 THORACIC AORTIC ANEURYSM WITHOUT RUPTURE (CMS/HCC): ICD-10-CM

## 2021-01-13 DIAGNOSIS — G45.9 TIA (TRANSIENT ISCHEMIC ATTACK): ICD-10-CM

## 2021-01-13 DIAGNOSIS — R94.31 ABNORMAL EKG: ICD-10-CM

## 2021-01-13 DIAGNOSIS — R06.02 SHORTNESS OF BREATH: ICD-10-CM

## 2021-01-13 DIAGNOSIS — E78.00 HYPERCHOLESTEROLEMIA: ICD-10-CM

## 2021-01-13 PROCEDURE — 93000 ELECTROCARDIOGRAM COMPLETE: CPT | Performed by: INTERNAL MEDICINE

## 2021-01-13 PROCEDURE — 99214 OFFICE O/P EST MOD 30 MIN: CPT | Performed by: INTERNAL MEDICINE

## 2021-01-13 RX ORDER — SULFAMETHOXAZOLE AND TRIMETHOPRIM 800; 160 MG/1; MG/1
1 TABLET ORAL 2 TIMES DAILY
COMMUNITY
Start: 2021-01-09 | End: 2021-01-14

## 2021-01-13 RX ORDER — METHENAMINE HIPPURATE 1000 MG/1
TABLET ORAL
COMMUNITY
Start: 2020-12-22 | End: 2023-03-07

## 2021-01-13 NOTE — PROGRESS NOTES
Albert Dale MD  Cardiology    [facility]    [facility]  [address]  [address] [place] [address]    TEL  [phone]  [facility]     01/13/21     Dear Dr. Madden:    It was my pleasure to see your patient, Ms. Yisel Lyle, at the [facility] today. She is presenting for cardiovascular follow-up. She was previously cared for by my colleague, Dr. Khalil, who retired at the end of last year.     As you know, she is a 70 y.o. female with a history of cervical arthropathy status post anterior cervical discectomy and fusion complicated by vocal cord paralysis, laryngeal spasm and dyspnea, obstructive sleep apnea on CPAP therapy, a probable TIA in November 2019, coronary calcifications, and a mildly enlarged ascending aorta (4 cm; 6/19).    She has done well since her last visit.  She reports no new neurologic symptoms.  She reports no new cardiopulmonary symptoms.  No chest pain or pressure.  Dyspnea remains intermittent, but not progressing.  No lower extremity edema, palpitations, lightheadedness, or syncope.  No bleeding issues on aspirin.  She continues to walk 2-3 miles every day without limitations.  No change in her exercise tolerance.  She is compliant with her medications.  No side effects.    Past Medical History:  Past Medical History:   Diagnosis Date   • Abnormal ECG    • Ascending aorta enlargement (CMS/HCC) 7/17/2019    The ascending aorta is mildly enlarged measuring approximately 4 cm diameter.  (6/6/2019)   • Asthma    • Coronary artery calcification seen on CT scan 7/17/2019    Mild coronary artery calcifications on CT of chest (6/6/2019)   • GERD (gastroesophageal reflux disease)    • Obstructive sleep apnea syndrome 1/15/2020   • Opacity of lung on imaging study 7/17/2019    8mm subsolid opacity in the posterior right upper lobe, which may be inflammatory in nature (noted on CT of chest 6/6/2019)   •  Osteopenia 7/23/2020   • Shortness of breath 7/17/2019   • Stroke (CMS/HCC)    • Vocal cord paralysis     compliacation of anterior cervical discectomy with fusion       Past Surgical History:  Past Surgical History:   Procedure Laterality Date   • ANTERIOR CERVICAL DISCECTOMY W/ FUSION  10/2018   • CATARACT EXTRACTION W/  INTRAOCULAR LENS IMPLANT Left 2017   • COLONOSCOPY W/ POLYPECTOMY      colonoscopies every 3 years   • EYE SURGERY Left 2017    fror replacement of wrong lens from cataract surgery 2 weeks prior   • REDUCTION MAMMAPLASTY Bilateral 1988   • SKIN BIOPSY     • TONSILLECTOMY  1976   • VOCAL CORD INJECTION Right 03/2019    for paralysis after acdf       Medications:  Current Outpatient Medications   Medication Sig Dispense Refill   • acetaminophen (TYLENOL) 500 mg tablet Take 1,000 mg by mouth daily as needed for mild pain.     • albuterol HFA (VENTOLIN HFA) 90 mcg/actuation inhaler 2 puffs daily and an additional dose as needed for wheezing or shortness of breath.       • ascorbic acid (VITAMIN C ORAL) Take 250 mg by mouth daily.     • aspirin 81 mg enteric coated tablet Take 81 mg by mouth daily.     • cholecalciferol, vitamin D3, 50 mcg (2,000 unit) capsule Take 2,000 Units by mouth daily. 2 capsules 4000 IU daily      • famotidine (PEPCID) 20 mg tablet Take 20 mg by mouth daily.     • fexofenadine (ALLEGRA) 180 mg tablet Take 180 mg by mouth daily.       • fluticasone propionate (FLONASE) 50 mcg/actuation nasal spray Administer 2 sprays into each nostril daily.     • methenamine (HIPREX) 1 gram tablet TAKE 1 TABLET BY MOUTH TWO TIMES A DAY . START TAKING AFTER MACROBID     • QVAR REDIHALER 80 mcg/actuation HFA aerosol breath activated Inhale 2 puffs daily.       • rosuvastatin (CRESTOR) 20 mg tablet Take 10 mg by mouth daily.   90 tablet 3   • sulfamethoxazole-trimethoprim (BACTRIM DS) 800-160 mg per tablet Take 1 tablet by mouth 2 times daily.     • beclomethasone (QVAR) 40 mcg/actuation inhaler  Inhale 2 puffs 2 (two) times a day. Rinse mouth with water after use to reduce aftertaste and incidence of candidiasis.   Do not swallow.  For patients not on a ventilator, a spacer is recommended to be used with this medication/inhaler.     • BROVANA 15 mcg/2 mL nebulizer solution 2 (two) times a day.       No current facility-administered medications for this visit.        Allergies: Iodine and iodide containing products, Venom-honey bee, and Anesthesia s/i-40 (propofol) [propofol]    Social History: She is a retired .  She has 2 children.  She is a never smoker.  Rare alcohol use.    Family History: All 5 maternal uncles had coronary disease.  No family history of premature coronary artery disease, arrhythmias, cardiomyopathies, or sudden cardiac death.    Review of Systems: A complete 14-point review of systems is negative, except as noted in the HPI.    Exam:  Objective   Vitals:    01/13/21 1040   BP: 110/68   Pulse: 70   SpO2: 98%     Body mass index is 27.21 kg/m².  Constitutional: Appears comfortable.   Eyes: No icterus.   ENT: Deferred. Patient wearing a mask.   Neck: No jugular venous distention. Supple, normal range of motion.  Vascular: No carotid bruits. Radial pulses intact and equal.  Cardiac: Normal S1 and S2, regular rhythm. No murmurs, rubs, or gallops appreciated.  Lungs: Clear to auscultation bilaterally.  Mild expiratory stridor with deep breathing.  GI: Soft, nontender, normoactive bowel sounds.  Extremities: Warm. No lower extremity edema.   Skin: Dry. No jaundice.   Musculoskeletal: No joint swelling or erythema.   Neurologic: Awake, alert, oriented.  Moving all extremities.  Psychiatric: No agitation.    Labs: Personally reviewed and notable for the following.   Per Dr. Khalil's notes, labs on 2/8/2020 revealed , TG 83, HDL 55, LDL 70, CMP normal, TSH normal  Lab Results   Component Value Date    LDLCALC 179 (H) 07/26/2019    CHOL 277 (H) 07/26/2019    TRIG 61  07/26/2019    HDL 86 07/26/2019     07/26/2019    K 4.8 07/26/2019    BUN 23 (H) 07/26/2019    CREATININE 0.8 07/26/2019    WBC 5.77 06/18/2019    HGB 14.0 06/18/2019     06/18/2019       Cardiovascular Studies:   1.  Stress echocardiogram, 10/2018: Negative for ischemia.  Fair-good exercise tolerance. Rest echo-normal LV wall thickness, LVEF 65%, normal RV systolic function, no significant valvular disease noted.  Ascending aorta 3.8 cm.  No mention of the aortic valve.  2.  CT chest, 6/2019: Ascending aorta 4 cm    ECG from today personally reviewed and discussed with the patient shows sinus rhythm, nonspecific T wave abnormality.  No significant change compared with tracing from 7/23/2020.    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  Dr. Khalil previously suspected additional nerve damage may have developed resulting in dysfunction of her upper airway musculature and producing her exertional dyspnea.  She has no signs of heart failure.     Coronary calcification incidentally noted on CT scan 6/19:  Coronary calcification suggest the presence of coronary atherosclerosis.  This is especially relevant with her family history of coronary on her mother side of the family. She had a negative stress test for ischemia in October 2018.  No new anginal symptoms.  She will remain on aspirin and rosuvastatin.     Hypercholesterolemia:  Her LDL should be at least less than 70 and perhaps closer to 55 in the setting of her TIA and evidence of small vessel intracranial disease.  Her LDL in February was 70 mg/dL.  She reports that labs will be performed through your office soon.  She will send me a copy of the results.  For now, she will remain on her current statin regimen.  She is quite interested in assistance with weight loss.  I have placed a referral to  our weight center.     Abnormal ECG:  She has low voltage on her electrocardiogram but no findings on her echocardiogram to support an infiltrative process, pericardial effusion and no findings on her CT scan to explain the low voltage.     Mildly dilated ascending aorta (CT, 4 cm '19):  I will refer her to our aortic center for further monitoring.  Blood pressure well controlled.  Her echocardiogram report did not mention the structure of her aortic valve.  I will obtain an echocardiogram to evaluate for a bicuspid aortic valve, as this would change the management of her aortic dilation and impact family screening recommendations.       TIA 11/19:  No new focal neurologic symptoms.  Blood pressure in target range.  LDL management as above.      It was my pleasure to visit with Yisel Lyle in clinic today.  She will follow up with me in 6 months.  Please do not hesitate to contact me with any questions.      Sincerely,         ________________  Albert Dale MD

## 2021-01-14 PROBLEM — E78.00 HYPERCHOLESTEROLEMIA: Status: ACTIVE | Noted: 2021-01-14

## 2021-01-19 ENCOUNTER — TELEPHONE (OUTPATIENT)
Dept: SCHEDULING | Facility: CLINIC | Age: 71
End: 2021-01-19

## 2021-01-19 DIAGNOSIS — E78.00 HYPERCHOLESTEROLEMIA: Primary | ICD-10-CM

## 2021-01-19 NOTE — TELEPHONE ENCOUNTER
Pt calling in requesting routine lab scripts from  Dr. Dale. State she told him not ordered them  after her OV on 1/13/2021. Pt changed her mind and needs script mailed to address on file.     Pt can be reached at [phone]

## 2021-02-04 ENCOUNTER — LAB REQUISITION (OUTPATIENT)
Dept: LAB | Facility: HOSPITAL | Age: 71
End: 2021-02-04
Attending: NURSE PRACTITIONER
Payer: MEDICARE

## 2021-02-04 DIAGNOSIS — E78.00 PURE HYPERCHOLESTEROLEMIA, UNSPECIFIED: ICD-10-CM

## 2021-02-04 LAB
ALBUMIN SERPL-MCNC: 4.2 G/DL (ref 3.4–5)
ALP SERPL-CCNC: 55 IU/L (ref 35–126)
ALT SERPL-CCNC: 19 IU/L (ref 11–54)
ANION GAP SERPL CALC-SCNC: 11 MEQ/L (ref 3–15)
AST SERPL-CCNC: 24 IU/L (ref 15–41)
BILIRUB SERPL-MCNC: 1 MG/DL (ref 0.3–1.2)
BUN SERPL-MCNC: 17 MG/DL (ref 8–20)
CALCIUM SERPL-MCNC: 10 MG/DL (ref 8.9–10.3)
CHLORIDE SERPL-SCNC: 102 MEQ/L (ref 98–109)
CHOLEST SERPL-MCNC: 176 MG/DL
CO2 SERPL-SCNC: 26 MEQ/L (ref 22–32)
CREAT SERPL-MCNC: 0.9 MG/DL (ref 0.6–1.1)
GFR SERPL CREATININE-BSD FRML MDRD: >60 ML/MIN/1.73M*2
GLUCOSE SERPL-MCNC: 89 MG/DL (ref 70–99)
HDLC SERPL-MCNC: 83 MG/DL
HDLC SERPL: 2.1 {RATIO}
LDLC SERPL CALC-MCNC: 79 MG/DL
NONHDLC SERPL-MCNC: 93 MG/DL
POTASSIUM SERPL-SCNC: 4.5 MEQ/L (ref 3.6–5.1)
PROT SERPL-MCNC: 6.4 G/DL (ref 6–8.2)
SODIUM SERPL-SCNC: 139 MEQ/L (ref 136–144)
TRIGL SERPL-MCNC: 68 MG/DL (ref 30–149)

## 2021-02-04 PROCEDURE — 80053 COMPREHEN METABOLIC PANEL: CPT | Performed by: NURSE PRACTITIONER

## 2021-02-04 PROCEDURE — 80061 LIPID PANEL: CPT | Performed by: NURSE PRACTITIONER

## 2021-02-04 PROCEDURE — 36415 COLL VENOUS BLD VENIPUNCTURE: CPT | Performed by: NURSE PRACTITIONER

## 2021-02-12 ENCOUNTER — TELEPHONE (OUTPATIENT)
Dept: CARDIOLOGY | Facility: CLINIC | Age: 71
End: 2021-02-12

## 2021-02-12 NOTE — TELEPHONE ENCOUNTER
Pt of Dr. Dale- Calling to confirm appointment time for TTE, advised 2/25 at 9:45 AM.     Pt concerned as she has allergy to contrast. Advised bubble study could be done which would not cause allergic reaction.     No call back needed.

## 2021-02-25 ENCOUNTER — HOSPITAL ENCOUNTER (OUTPATIENT)
Dept: CARDIOLOGY | Facility: HOSPITAL | Age: 71
Discharge: HOME | End: 2021-02-25
Attending: INTERNAL MEDICINE
Payer: MEDICARE

## 2021-02-25 VITALS
HEIGHT: 61 IN | WEIGHT: 144 LBS | DIASTOLIC BLOOD PRESSURE: 80 MMHG | SYSTOLIC BLOOD PRESSURE: 110 MMHG | BODY MASS INDEX: 27.19 KG/M2

## 2021-02-25 DIAGNOSIS — I71.20 THORACIC AORTIC ANEURYSM WITHOUT RUPTURE (CMS/HCC): ICD-10-CM

## 2021-02-25 LAB
AORTIC ROOT ANNULUS - M-MODE: 3.37 CM
ASCENDING AORTA: 4 CM
AV PEAK GRADIENT: 4 MMHG
AV PEAK VELOCITY-S: 0.97 M/S
BSA FOR ECHO PROCEDURE: 1.68 M2
CUSP SEPARATION: 1.83 CM
DOP CALC LVOT STROKE VOLUME: 72.97 ML
DOP CALC RVOT VTI: 11.05 CM
E WAVE DECELERATION TIME: 348.38 MS
E/A RATIO: 0.83
E/E' RATIO: 11.94
E/LAT E' RATIO: 7.14
EDV (BP): 31.6 ML
EF (A4C): 70.82 %
INTERVENTRICULAR SEPTUM: 0.94 CM
LA ESV (BP): 13.59 ML
LA ESV INDEX (A2C): 7.62 ML/M2
LA ESV INDEX (BP): 8.09 ML/M2
LAAS-AP2: 7.4 CM2
LAAS-AP4: 8.05 CM2
LAD 2D: 2.58 CM
LAV-S: 12.8 ML
LEFT ATRIUM VOLUME INDEX: 7.8 ML/M2
LEFT ATRIUM VOLUME: 13.1 ML
LEFT INTERNAL DIMENSION IN SYSTOLE: 2.33 CM (ref 2.33–3.52)
LEFT VENTRICLE DIASTOLIC VOLUME INDEX: 19.58 ML/M2
LEFT VENTRICLE DIASTOLIC VOLUME: 32.9 ML
LEFT VENTRICLE MASS INDEX: 82.35 G/M2
LEFT VENTRICLE SYSTOLIC VOLUME INDEX: 5.71 ML/M2
LEFT VENTRICLE SYSTOLIC VOLUME: 9.6 ML
LEFT VENTRICULAR INTERNAL DIMENSION IN DIASTOLE: 3.63 CM (ref 3.92–5.44)
LEFT VENTRICULAR MASS: 138.34 G
LEFT VENTRICULAR POSTERIOR WALL IN END DIASTOLE: 1.13 CM (ref 0.51–0.95)
LV DIASTOLIC VOLUME: 28 ML
LVEDVI(A2C): 16.67 ML/M2
LVEDVI(BP): 18.81 ML/M2
LVOT 2D: 2.24 CM
LVOT A: 3.96 CM2
LVOT MG: 2.1 MMHG
LVOT MV: 0.69 M/S
LVOT PEAK VELOCITY: 0.92 M/S
LVOT PG: 3.41 MMHG
LVOT VTI: 18.52 CM
MV E'TISSUE VEL-LAT: 0.09 M/S
MV E'TISSUE VEL-MED: 0.05 M/S
MV PEAK A VEL: 0.79 M/S
MV PEAK E VEL: 0.65 M/S
MV VALVE AREA P 1/2 METHOD: 2.18 CM2
POSTERIOR WALL: 0.81 CM
RIGHT ATRIAL LENGTH MEDIAL-LATERAL (APICAL 4-CHAMBER VIEW): 4.6 CM
RIGHT VENTRICULAR END-DIASTOLIC DIMENSION: 3.7 CM
SEPTAL TISSUE DOPPLER FREE WALL LATE DIA VELOCITY (APICAL 4 CHAMBER VIEW): 0.11 M/S
Z-SCORE OF LEFT VENTRICULAR DIMENSION IN END DIASTOLE: -2.41
Z-SCORE OF LEFT VENTRICULAR DIMENSION IN END SYSTOLE: -1.63
Z-SCORE OF LEFT VENTRICULAR POSTERIOR WALL IN END DIASTOLE: 2.54

## 2021-02-25 PROCEDURE — 93306 TTE W/DOPPLER COMPLETE: CPT

## 2021-02-25 PROCEDURE — 93306 TTE W/DOPPLER COMPLETE: CPT | Mod: 26 | Performed by: INTERNAL MEDICINE

## 2021-03-15 ENCOUNTER — TRANSCRIBE ORDERS (OUTPATIENT)
Dept: SCHEDULING | Age: 71
End: 2021-03-15

## 2021-03-15 DIAGNOSIS — I25.10 ATHEROSCLEROTIC HEART DISEASE OF NATIVE CORONARY ARTERY WITHOUT ANGINA PECTORIS: Primary | ICD-10-CM

## 2021-05-13 ENCOUNTER — APPOINTMENT (OUTPATIENT)
Dept: LAB | Facility: HOSPITAL | Age: 71
End: 2021-05-13
Attending: OBSTETRICS & GYNECOLOGY
Payer: MEDICARE

## 2021-05-13 ENCOUNTER — TRANSCRIBE ORDERS (OUTPATIENT)
Dept: LAB | Facility: HOSPITAL | Age: 71
End: 2021-05-13

## 2021-05-13 DIAGNOSIS — N39.0 URINARY TRACT INFECTION, SITE NOT SPECIFIED: ICD-10-CM

## 2021-05-13 DIAGNOSIS — N39.0 URINARY TRACT INFECTION, SITE NOT SPECIFIED: Primary | ICD-10-CM

## 2021-05-13 PROCEDURE — 87086 URINE CULTURE/COLONY COUNT: CPT

## 2021-05-14 ENCOUNTER — TELEPHONE (OUTPATIENT)
Dept: SCHEDULING | Facility: CLINIC | Age: 71
End: 2021-05-14

## 2021-05-14 RX ORDER — ROSUVASTATIN CALCIUM 20 MG/1
10 TABLET, COATED ORAL DAILY
Qty: 45 TABLET | Refills: 3 | Status: SHIPPED | OUTPATIENT
Start: 2021-05-14 | End: 2021-05-14

## 2021-05-14 RX ORDER — ROSUVASTATIN CALCIUM 10 MG/1
10 TABLET, COATED ORAL DAILY
Qty: 45 TABLET | Refills: 3 | Status: SHIPPED | OUTPATIENT
Start: 2021-05-14 | End: 2021-07-13

## 2021-05-14 NOTE — TELEPHONE ENCOUNTER
Dr Dale patient. Patient called to clarify her Rosuvastatin dose. She saw the dose in her MyChart and says she has been getting 10 mg Rosuvastatin tablets from her PCP and cutting them in half for 5 mg daily.   She has been on 5 mg daily for over one year.

## 2021-05-14 NOTE — TELEPHONE ENCOUNTER
Medication Clarification     Name of caller: Yisel Lyle     Relationship to patient: self    Name of patient: Yisel Lyle    Name of physician: Albert Dale MD    Name of medication: rosuvastatin (CRESTOR) 20 mg tablet    What needs to be clarified is: Dosage instructions. 10 mg or 20mg?    Best contact number: #[phone]

## 2021-05-14 NOTE — TELEPHONE ENCOUNTER
Component      Latest Ref Rng & Units 7/26/2019 2/4/2021   Triglycerides      30 - 149 mg/dL 61 68   Cholesterol      <=200 mg/dL 277 (H) 176   HDL      >=55 mg/dL 86 83   LDL Calculated      <=100 mg/dL 179 (H) 79   Non-HDL, Calculated      mg/dL 191 93   RISK      <=5.0 3.2 2.1       Per January note Hypercholesterolemia:  Her LDL should be at least less than 70 and perhaps closer to 55 in the setting of her TIA and evidence of small vessel intracranial disease.  Her LDL in February was 70 mg/dL.  She reports that labs will be performed through your office soon.  She will send me a copy of the results.  For now, she will remain on her current statin regimen.     At that visit we had her listed as taking 10 mg or 0.5 mg of a 20 mg tablet.

## 2021-05-14 NOTE — TELEPHONE ENCOUNTER
Spoke with patient she will take whole 10 mg Crestor tablet daily. Sent in electronically to [facility]

## 2021-05-14 NOTE — TELEPHONE ENCOUNTER
Medicine Refill Request    Last Office Visit: Visit date not found  Last Telemedicine Visit: Visit date not found    Next Office Visit: Visit date not found  Next Telemedicine Visit: Visit date not found         Current Outpatient Medications:   •  acetaminophen (TYLENOL) 500 mg tablet, Take 1,000 mg by mouth daily as needed for mild pain., Disp: , Rfl:   •  albuterol HFA (VENTOLIN HFA) 90 mcg/actuation inhaler, 2 puffs daily and an additional dose as needed for wheezing or shortness of breath.  , Disp: , Rfl:   •  ascorbic acid (VITAMIN C ORAL), Take 250 mg by mouth daily., Disp: , Rfl:   •  aspirin 81 mg enteric coated tablet, Take 81 mg by mouth daily., Disp: , Rfl:   •  beclomethasone (QVAR) 40 mcg/actuation inhaler, Inhale 2 puffs 2 (two) times a day. Rinse mouth with water after use to reduce aftertaste and incidence of candidiasis.  Do not swallow. For patients not on a ventilator, a spacer is recommended to be used with this medication/inhaler., Disp: , Rfl:   •  BROVANA 15 mcg/2 mL nebulizer solution, 2 (two) times a day., Disp: , Rfl:   •  cholecalciferol, vitamin D3, 50 mcg (2,000 unit) capsule, Take 2,000 Units by mouth daily. 2 capsules 4000 IU daily , Disp: , Rfl:   •  famotidine (PEPCID) 20 mg tablet, Take 20 mg by mouth daily., Disp: , Rfl:   •  fexofenadine (ALLEGRA) 180 mg tablet, Take 180 mg by mouth daily.  , Disp: , Rfl:   •  fluticasone propionate (FLONASE) 50 mcg/actuation nasal spray, Administer 2 sprays into each nostril daily., Disp: , Rfl:   •  methenamine (HIPREX) 1 gram tablet, TAKE 1 TABLET BY MOUTH TWO TIMES A DAY . START TAKING AFTER MACROBID, Disp: , Rfl:   •  QVAR REDIHALER 80 mcg/actuation HFA aerosol breath activated, Inhale 2 puffs daily.  , Disp: , Rfl:   •  rosuvastatin (CRESTOR) 20 mg tablet, Take 10 mg by mouth daily.  , Disp: 90 tablet, Rfl: 3      BP Readings from Last 3 Encounters:   02/25/21 110/80   01/13/21 110/68   11/02/20 123/82       Recent Lab results:  Lab Results    Component Value Date    CHOL 176 02/04/2021   ,   Lab Results   Component Value Date    HDL 83 02/04/2021   ,   Lab Results   Component Value Date    LDLCALC 79 02/04/2021   ,   Lab Results   Component Value Date    TRIG 68 02/04/2021        Lab Results   Component Value Date    GLUCOSE 89 02/04/2021   , No results found for: HGBA1C      Lab Results   Component Value Date    CREATININE 0.9 02/04/2021       Lab Results   Component Value Date    TSH 3.51 06/18/2019

## 2021-05-14 NOTE — TELEPHONE ENCOUNTER
Thank you Lily.  Would you mind updating our medication list with the correct dose?  Based on her most recent LDL, could you please ask her to increase to rosuvastatin 10 mg daily?  She should call us if she has any issues and get a repeat lipid panel before her next visit with me.  Thank you so much.

## 2021-05-15 LAB
BACTERIA UR CULT: ABNORMAL
BACTERIA UR CULT: ABNORMAL

## 2021-07-01 ENCOUNTER — LAB REQUISITION (OUTPATIENT)
Dept: LAB | Facility: HOSPITAL | Age: 71
End: 2021-07-01
Attending: NURSE PRACTITIONER
Payer: MEDICARE

## 2021-07-01 DIAGNOSIS — E78.00 PURE HYPERCHOLESTEROLEMIA, UNSPECIFIED: ICD-10-CM

## 2021-07-01 LAB
ALBUMIN SERPL-MCNC: 4.3 G/DL (ref 3.4–5)
ALP SERPL-CCNC: 47 IU/L (ref 35–126)
ALT SERPL-CCNC: 19 IU/L (ref 11–54)
ANION GAP SERPL CALC-SCNC: 9 MEQ/L (ref 3–15)
AST SERPL-CCNC: 23 IU/L (ref 15–41)
BILIRUB SERPL-MCNC: 0.9 MG/DL (ref 0.3–1.2)
BUN SERPL-MCNC: 20 MG/DL (ref 8–20)
CALCIUM SERPL-MCNC: 9.8 MG/DL (ref 8.9–10.3)
CHLORIDE SERPL-SCNC: 104 MEQ/L (ref 98–109)
CHOLEST SERPL-MCNC: 170 MG/DL
CO2 SERPL-SCNC: 27 MEQ/L (ref 22–32)
CREAT SERPL-MCNC: 0.8 MG/DL (ref 0.6–1.1)
GFR SERPL CREATININE-BSD FRML MDRD: >60 ML/MIN/1.73M*2
GLUCOSE SERPL-MCNC: 87 MG/DL (ref 70–99)
HDLC SERPL-MCNC: 82 MG/DL
HDLC SERPL: 2.1 {RATIO}
LDLC SERPL CALC-MCNC: 77 MG/DL
NONHDLC SERPL-MCNC: 88 MG/DL
POTASSIUM SERPL-SCNC: 4.7 MEQ/L (ref 3.6–5.1)
PROT SERPL-MCNC: 6.6 G/DL (ref 6–8.2)
SODIUM SERPL-SCNC: 140 MEQ/L (ref 136–144)
TRIGL SERPL-MCNC: 54 MG/DL (ref 30–149)

## 2021-07-01 PROCEDURE — 80053 COMPREHEN METABOLIC PANEL: CPT | Performed by: NURSE PRACTITIONER

## 2021-07-01 PROCEDURE — 36415 COLL VENOUS BLD VENIPUNCTURE: CPT | Performed by: NURSE PRACTITIONER

## 2021-07-01 PROCEDURE — 80061 LIPID PANEL: CPT | Performed by: NURSE PRACTITIONER

## 2021-07-13 ENCOUNTER — OFFICE VISIT (OUTPATIENT)
Dept: CARDIOLOGY | Facility: CLINIC | Age: 71
End: 2021-07-13
Payer: MEDICARE

## 2021-07-13 VITALS
HEART RATE: 61 BPM | HEIGHT: 61 IN | OXYGEN SATURATION: 97 % | DIASTOLIC BLOOD PRESSURE: 82 MMHG | BODY MASS INDEX: 26.24 KG/M2 | SYSTOLIC BLOOD PRESSURE: 114 MMHG | WEIGHT: 139 LBS

## 2021-07-13 DIAGNOSIS — R06.02 SHORTNESS OF BREATH: ICD-10-CM

## 2021-07-13 DIAGNOSIS — E78.00 HYPERCHOLESTEROLEMIA: ICD-10-CM

## 2021-07-13 DIAGNOSIS — G45.9 TIA (TRANSIENT ISCHEMIC ATTACK): ICD-10-CM

## 2021-07-13 DIAGNOSIS — I25.10 CORONARY ARTERY CALCIFICATION SEEN ON CT SCAN: Primary | ICD-10-CM

## 2021-07-13 DIAGNOSIS — R94.31 ABNORMAL EKG: ICD-10-CM

## 2021-07-13 DIAGNOSIS — I77.89 ASCENDING AORTA ENLARGEMENT (CMS/HCC): ICD-10-CM

## 2021-07-13 PROCEDURE — 93000 ELECTROCARDIOGRAM COMPLETE: CPT | Performed by: INTERNAL MEDICINE

## 2021-07-13 PROCEDURE — 99214 OFFICE O/P EST MOD 30 MIN: CPT | Performed by: INTERNAL MEDICINE

## 2021-07-13 RX ORDER — ROSUVASTATIN CALCIUM 20 MG/1
10 TABLET, COATED ORAL DAILY
Qty: 45 TABLET | Refills: 1 | Status: SHIPPED | OUTPATIENT
Start: 2021-07-13 | End: 2022-01-12 | Stop reason: SDUPTHER

## 2021-07-13 RX ORDER — BUDESONIDE AND FORMOTEROL FUMARATE DIHYDRATE 160; 4.5 UG/1; UG/1
AEROSOL RESPIRATORY (INHALATION)
COMMUNITY
Start: 2021-06-30 | End: 2024-02-15

## 2021-07-14 NOTE — PROGRESS NOTES
Albert Dale MD  Cardiology    [facility]    [facility]  [facility] [address]  [address] [place] [address]    TEL  [phone]  [facility]     07/13/21    Dear Dr. Madden:    It was my pleasure to see Yisel Lyle at the [facility] today for follow-up.    As you know, she is a 71 y.o. female with a history of cervical arthropathy status post anterior cervical discectomy and fusion complicated by vocal cord paralysis, laryngeal spasm and dyspnea, obstructive sleep apnea on CPAP therapy, a probable TIA in November 2019, coronary calcifications, and a mildly enlarged ascending aorta (4 cm).    She has done well since her last visit.  She reports no new neurologic symptoms.  She reports no new cardiopulmonary symptoms.  No chest pain or pressure.  Dyspnea remains intermittent, but not progressing.  Worse in the humidity.  She has occasional pedal edema at the end of the day in hot weather.  Resolves by morning.  No orthopnea or PND.  No palpitations, lightheadedness, or syncope.  No bleeding issues on aspirin.  She continues to walk 3-4 miles every day without limitations.  No change in her exercise tolerance.  She is compliant with her medications.  No side effects.    Past Medical History:  Past Medical History:   Diagnosis Date   • Abnormal ECG    • Ascending aorta enlargement (CMS/HCC) 7/17/2019    The ascending aorta is mildly enlarged measuring approximately 4 cm diameter.  (6/6/2019)   • Asthma    • Coronary artery calcification seen on CT scan 7/17/2019    Mild coronary artery calcifications on CT of chest (6/6/2019)   • GERD (gastroesophageal reflux disease)    • Obstructive sleep apnea syndrome 1/15/2020   • Opacity of lung on imaging study 7/17/2019    8mm subsolid opacity in the posterior right upper lobe, which may be inflammatory in nature (noted on CT of chest 6/6/2019)   • Osteopenia 7/23/2020   • Shortness of breath  7/17/2019   • Stroke (CMS/HCC)    • Vocal cord paralysis     compliacation of anterior cervical discectomy with fusion       Past Surgical History:  Past Surgical History:   Procedure Laterality Date   • ANTERIOR CERVICAL DISCECTOMY W/ FUSION  10/2018   • CATARACT EXTRACTION W/  INTRAOCULAR LENS IMPLANT Left 2017   • COLONOSCOPY W/ POLYPECTOMY      colonoscopies every 3 years   • EYE SURGERY Left 2017    fror replacement of wrong lens from cataract surgery 2 weeks prior   • REDUCTION MAMMAPLASTY Bilateral 1988   • SKIN BIOPSY     • TONSILLECTOMY  1976   • VOCAL CORD INJECTION Right 03/2019    for paralysis after acdf       Medications:  Current Outpatient Medications   Medication Sig Dispense Refill   • acetaminophen (TYLENOL) 500 mg tablet Take 1,000 mg by mouth daily as needed for mild pain.     • albuterol HFA (VENTOLIN HFA) 90 mcg/actuation inhaler 2 puffs daily and an additional dose as needed for wheezing or shortness of breath.       • ascorbic acid (VITAMIN C ORAL) Take 250 mg by mouth daily.     • aspirin 81 mg enteric coated tablet Take 81 mg by mouth daily.     • BROVANA 15 mcg/2 mL nebulizer solution 2 (two) times a day.     • cholecalciferol, vitamin D3, 50 mcg (2,000 unit) capsule Take 2,000 Units by mouth daily. 2 capsules 4000 IU daily      • famotidine (PEPCID) 20 mg tablet Take 20 mg by mouth daily.     • fexofenadine (ALLEGRA) 180 mg tablet Take 180 mg by mouth daily.       • fluticasone propionate (FLONASE) 50 mcg/actuation nasal spray Administer 2 sprays into each nostril daily.     • methenamine (HIPREX) 1 gram tablet TAKE 1 TABLET BY MOUTH TWO TIMES A DAY . START TAKING AFTER MACROBID     • SYMBICORT 160-4.5 mcg/actuation inhaler      • rosuvastatin (CRESTOR) 20 mg tablet Take 0.5 tablets (10 mg total) by mouth daily. 45 tablet 1     No current facility-administered medications for this visit.       Allergies: Iodine and iodide containing products, Venom-honey bee, Anesthesia s/i-40 (propofol)  [propofol], and Tree nut    Social History: She is a retired .  She has 2 children.  She is a never smoker.  Rare alcohol use.  No recreational drugs.    Family History: All 5 maternal uncles had coronary disease.  No family history of premature coronary artery disease, arrhythmias, cardiomyopathies, or sudden cardiac death.    Review of Systems: A complete 14-point review of systems is negative, except as noted in the HPI.    Exam:  Objective   Vitals:    07/13/21 1014   BP: 114/82   Pulse: 61   SpO2: 97%     Body mass index is 26.26 kg/m².  Constitutional: Appears comfortable.   Eyes: No icterus.   ENT: Deferred. Patient wearing a mask.   Neck: No jugular venous distention. Supple, normal range of motion.  Vascular: No carotid bruits. Radial pulses intact and equal.  Cardiac: Normal S1 and S2, regular rhythm. No murmurs, rubs, or gallops appreciated.  Lungs: Clear to auscultation bilaterally.   GI: Soft, nontender, normoactive bowel sounds.  Extremities: Warm. No lower extremity edema.   Skin: Dry. No jaundice.   Musculoskeletal: No joint swelling or erythema.   Neurologic: Awake, alert, oriented.  Moving all extremities.  Psychiatric: No agitation.    Labs: Personally reviewed and discussed with the patient.  Notable for the following.   Lab Results   Component Value Date    LDLCALC 77 07/01/2021    CHOL 170 07/01/2021    TRIG 54 07/01/2021    HDL 82 07/01/2021     07/01/2021    K 4.7 07/01/2021    BUN 20 07/01/2021    CREATININE 0.8 07/01/2021    WBC 5.77 06/18/2019    HGB 14.0 06/18/2019     06/18/2019     Cardiovascular Studies:   1.  Stress echocardiogram, 10/2018: Negative for ischemia.  Fair-good exercise tolerance. Rest echo-normal LV wall thickness, LVEF 65%, normal RV systolic function, no significant valvular disease noted.  Ascending aorta 3.8 cm.  No mention of the aortic valve.  2.  CT chest, 6/2019: Ascending aorta 4 cm  3.  TTE, 2/21: Normal LV size, focal basal  septal hypertrophy, LVEF 65%, no WMAs. Normal RV size/function. Tricuspid aortic valve. Ascending aorta 4 cm.     ECG from today personally reviewed and discussed with the patient shows sinus rhythm, low voltage.  No significant change compared with tracing from 1/13/2021.    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  Dr. Khalil previously suspected additional nerve damage may have developed resulting in dysfunction of her upper airway musculature and producing her exertional dyspnea.  She has no signs of heart failure.  Suspect that her trivial pedal edema in the evening is related to venous insufficiency.  Suggested a trial of compression stockings.  She knows to contact me should her edema progress.      Coronary calcification incidentally noted on CT scan 6/19:  Coronary calcification suggest the presence of coronary atherosclerosis.  This is especially relevant with her family history of coronary on her mother side of the family. She had a negative stress test for ischemia in October 2018.  No new anginal symptoms.  She will remain on aspirin and rosuvastatin.     Hypercholesterolemia:  Her LDL should be at least less than 70 mg/dL.  Her LDL in February was 70 mg/dL, slightly higher before this visit.  She will renew efforts at a low saturated fat diet.  We will repeat a lipid panel before her next visit.  If her LDL remains above goal at that time, we will increase her rosuvastatin dose.     Abnormal ECG:  She has low voltage on her electrocardiogram but no findings on her echocardiogram to support an infiltrative process, pericardial effusion and no findings on her CT scan to explain the low voltage.     Mildly dilated ascending aorta (CT, 4 cm '19):  She was referred to our aortic center for further monitoring, but has not yet made an appointment.  Blood  pressure well controlled.  Her recent echocardiogram revealed a tricuspid aortic valve with stable ascending aortic dimension.  Will require annual surveillance.     TIA 11/19:  No new focal neurologic symptoms.  Blood pressure in target range.  LDL management as above.    It was my pleasure to visit with Yisel Lyle in clinic today.  She will follow up with me in 6 months.  Please do not hesitate to contact me with any questions.      Sincerely,         ________________  Albert Dale MD

## 2022-01-06 ENCOUNTER — APPOINTMENT (OUTPATIENT)
Dept: LAB | Facility: HOSPITAL | Age: 72
End: 2022-01-06
Attending: OBSTETRICS & GYNECOLOGY
Payer: MEDICARE

## 2022-01-06 ENCOUNTER — LAB REQUISITION (OUTPATIENT)
Dept: LAB | Facility: HOSPITAL | Age: 72
End: 2022-01-06
Attending: INTERNAL MEDICINE
Payer: MEDICARE

## 2022-01-06 ENCOUNTER — TRANSCRIBE ORDERS (OUTPATIENT)
Dept: REGISTRATION | Facility: HOSPITAL | Age: 72
End: 2022-01-06

## 2022-01-06 DIAGNOSIS — E78.00 PURE HYPERCHOLESTEROLEMIA, UNSPECIFIED: ICD-10-CM

## 2022-01-06 DIAGNOSIS — R39.9 UNSPECIFIED SYMPTOMS AND SIGNS INVOLVING THE GENITOURINARY SYSTEM: ICD-10-CM

## 2022-01-06 DIAGNOSIS — I25.10 ATHEROSCLEROTIC HEART DISEASE OF NATIVE CORONARY ARTERY WITHOUT ANGINA PECTORIS: ICD-10-CM

## 2022-01-06 DIAGNOSIS — R39.9 UNSPECIFIED SYMPTOMS AND SIGNS INVOLVING THE GENITOURINARY SYSTEM: Primary | ICD-10-CM

## 2022-01-06 LAB
ALBUMIN SERPL-MCNC: 4.1 G/DL (ref 3.4–5)
ALP SERPL-CCNC: 49 IU/L (ref 35–126)
ALT SERPL-CCNC: 34 IU/L (ref 11–54)
ANION GAP SERPL CALC-SCNC: 12 MEQ/L (ref 3–15)
AST SERPL-CCNC: 29 IU/L (ref 15–41)
BILIRUB SERPL-MCNC: 0.9 MG/DL (ref 0.3–1.2)
BILIRUB UR QL STRIP.AUTO: NEGATIVE MG/DL
BUN SERPL-MCNC: 17 MG/DL (ref 8–20)
CALCIUM SERPL-MCNC: 9.8 MG/DL (ref 8.9–10.3)
CHLORIDE SERPL-SCNC: 101 MEQ/L (ref 98–109)
CHOLEST SERPL-MCNC: 192 MG/DL
CLARITY UR REFRACT.AUTO: CLEAR
CO2 SERPL-SCNC: 27 MEQ/L (ref 22–32)
COLOR UR AUTO: YELLOW
CREAT SERPL-MCNC: 0.8 MG/DL (ref 0.6–1.1)
GFR SERPL CREATININE-BSD FRML MDRD: >60 ML/MIN/1.73M*2
GLUCOSE SERPL-MCNC: 83 MG/DL (ref 70–99)
GLUCOSE UR STRIP.AUTO-MCNC: NEGATIVE MG/DL
HDLC SERPL-MCNC: 91 MG/DL
HDLC SERPL: 2.1 {RATIO}
HGB UR QL STRIP.AUTO: NEGATIVE
KETONES UR STRIP.AUTO-MCNC: NEGATIVE MG/DL
LDLC SERPL CALC-MCNC: 87 MG/DL
LEUKOCYTE ESTERASE UR QL STRIP.AUTO: NEGATIVE
NITRITE UR QL STRIP.AUTO: NEGATIVE
NONHDLC SERPL-MCNC: 101 MG/DL
PH UR STRIP.AUTO: 6.5 [PH]
POTASSIUM SERPL-SCNC: 4.4 MEQ/L (ref 3.6–5.1)
PROT SERPL-MCNC: 6.1 G/DL (ref 6–8.2)
PROT UR QL STRIP.AUTO: NEGATIVE
SODIUM SERPL-SCNC: 140 MEQ/L (ref 136–144)
SP GR UR REFRACT.AUTO: 1.02
TRIGL SERPL-MCNC: 70 MG/DL (ref 30–149)
UROBILINOGEN UR STRIP-ACNC: 0.2 EU/DL

## 2022-01-06 PROCEDURE — 80061 LIPID PANEL: CPT | Performed by: INTERNAL MEDICINE

## 2022-01-06 PROCEDURE — 81003 URINALYSIS AUTO W/O SCOPE: CPT

## 2022-01-06 PROCEDURE — 87086 URINE CULTURE/COLONY COUNT: CPT

## 2022-01-06 PROCEDURE — 80053 COMPREHEN METABOLIC PANEL: CPT | Performed by: INTERNAL MEDICINE

## 2022-01-06 PROCEDURE — 87077 CULTURE AEROBIC IDENTIFY: CPT

## 2022-01-06 PROCEDURE — 36415 COLL VENOUS BLD VENIPUNCTURE: CPT | Performed by: INTERNAL MEDICINE

## 2022-01-09 LAB
BACTERIA UR CULT: ABNORMAL
BACTERIA UR CULT: ABNORMAL

## 2022-01-12 ENCOUNTER — OFFICE VISIT (OUTPATIENT)
Dept: CARDIOLOGY | Facility: CLINIC | Age: 72
End: 2022-01-12
Payer: MEDICARE

## 2022-01-12 VITALS
BODY MASS INDEX: 25.11 KG/M2 | HEIGHT: 61 IN | OXYGEN SATURATION: 96 % | DIASTOLIC BLOOD PRESSURE: 74 MMHG | WEIGHT: 133 LBS | HEART RATE: 67 BPM | SYSTOLIC BLOOD PRESSURE: 102 MMHG

## 2022-01-12 DIAGNOSIS — R94.31 ABNORMAL EKG: ICD-10-CM

## 2022-01-12 DIAGNOSIS — I25.10 CORONARY ARTERY CALCIFICATION SEEN ON CT SCAN: Primary | ICD-10-CM

## 2022-01-12 DIAGNOSIS — G45.9 TIA (TRANSIENT ISCHEMIC ATTACK): ICD-10-CM

## 2022-01-12 DIAGNOSIS — J38.00 VOCAL CORD PARALYSIS: ICD-10-CM

## 2022-01-12 DIAGNOSIS — E78.00 HYPERCHOLESTEROLEMIA: ICD-10-CM

## 2022-01-12 DIAGNOSIS — R06.02 SHORTNESS OF BREATH: ICD-10-CM

## 2022-01-12 DIAGNOSIS — I77.89 ASCENDING AORTA ENLARGEMENT (CMS/HCC): ICD-10-CM

## 2022-01-12 PROCEDURE — 93000 ELECTROCARDIOGRAM COMPLETE: CPT | Performed by: INTERNAL MEDICINE

## 2022-01-12 PROCEDURE — 99214 OFFICE O/P EST MOD 30 MIN: CPT | Performed by: INTERNAL MEDICINE

## 2022-01-12 RX ORDER — ROSUVASTATIN CALCIUM 20 MG/1
20 TABLET, COATED ORAL DAILY
Qty: 90 TABLET | Refills: 1 | Status: SHIPPED | OUTPATIENT
Start: 2022-01-12 | End: 2022-08-23

## 2022-01-12 NOTE — PROGRESS NOTES
Albert Dale MD  Cardiology    [facility]    [facility]  [facility] [address]  [address] [place] [address]    TEL  [phone]  [facility]     01/12/22    Dear Dr. Madden:    It was my pleasure to see Yisel Lyle at the [facility] today for follow-up.    As you know, she is a 71 y.o. female with a history of cervical arthropathy status post anterior cervical discectomy and fusion complicated by vocal cord paralysis, laryngeal spasm and dyspnea, obstructive sleep apnea on CPAP therapy, a probable TIA in November 2019, coronary calcifications, and a mildly enlarged ascending aorta (4 cm).     She has done well since her last visit.  She reports no new neurologic symptoms.  She reports no new cardiopulmonary symptoms.  No chest pain or pressure.  Dyspnea remains intermittent, but not progressing.  Her lower extremity edema has resolved.  Typically only occurs in the summer months. No orthopnea or PND.  No palpitations or syncope.  No bleeding issues on aspirin.  She has been walking more frequently, achieving [phone] steps per day.  She has decreased her sugar intake.  She has lost weight.  She was congratulated on this achievement.  She has had some nonspecific dizziness that comes and goes over the last several weeks.  No associated cardiopulmonary symptoms.  Not associated with activity.    Past Medical History:  Past Medical History:   Diagnosis Date   • Abnormal ECG    • Ascending aorta enlargement (CMS/HCC) 7/17/2019    The ascending aorta is mildly enlarged measuring approximately 4 cm diameter.  (6/6/2019)   • Asthma    • Coronary artery calcification seen on CT scan 7/17/2019    Mild coronary artery calcifications on CT of chest (6/6/2019)   • GERD (gastroesophageal reflux disease)    • Obstructive sleep apnea syndrome 1/15/2020   • Opacity of lung on imaging study 7/17/2019    8mm subsolid opacity in the posterior right  upper lobe, which may be inflammatory in nature (noted on CT of chest 6/6/2019)   • Osteopenia 7/23/2020   • Shortness of breath 7/17/2019   • Stroke (CMS/HCC)    • Vocal cord paralysis     compliacation of anterior cervical discectomy with fusion       Past Surgical History:  Past Surgical History:   Procedure Laterality Date   • ANTERIOR CERVICAL DISCECTOMY W/ FUSION  10/2018   • CATARACT EXTRACTION W/  INTRAOCULAR LENS IMPLANT Left 2017   • COLONOSCOPY W/ POLYPECTOMY      colonoscopies every 3 years   • EYE SURGERY Left 2017    fror replacement of wrong lens from cataract surgery 2 weeks prior   • REDUCTION MAMMAPLASTY Bilateral 1988   • SKIN BIOPSY     • TONSILLECTOMY  1976   • VOCAL CORD INJECTION Right 03/2019    for paralysis after acdf       Medications:  Current Outpatient Medications   Medication Sig Dispense Refill   • acetaminophen (TYLENOL) 500 mg tablet Take 1,000 mg by mouth daily as needed for mild pain.     • albuterol HFA (VENTOLIN HFA) 90 mcg/actuation inhaler 2 puffs daily and an additional dose as needed for wheezing or shortness of breath.       • ascorbic acid (VITAMIN C ORAL) Take 250 mg by mouth daily.     • aspirin 81 mg enteric coated tablet Take 81 mg by mouth daily.     • BROVANA 15 mcg/2 mL nebulizer solution 2 (two) times a day.     • cholecalciferol, vitamin D3, 50 mcg (2,000 unit) capsule Take 2,000 Units by mouth daily. 2 capsules 4000 IU daily      • famotidine (PEPCID) 20 mg tablet Take 20 mg by mouth daily.     • fexofenadine (ALLEGRA) 180 mg tablet Take 180 mg by mouth daily.       • fluticasone propionate (FLONASE) 50 mcg/actuation nasal spray Administer 2 sprays into each nostril daily.     • methenamine (HIPREX) 1 gram tablet TAKE 1 TABLET BY MOUTH TWO TIMES A DAY . START TAKING AFTER MACROBID     • rosuvastatin 20 mg tablet Take 1 tablet (20 mg total) by mouth daily. 90 tablet 1   • SYMBICORT 160-4.5 mcg/actuation inhaler        No current facility-administered medications  for this visit.       Allergies: Iodine and iodide containing products, Venom-honey bee, Anesthesia s/i-40 (propofol) [propofol], and Tree nut    Social History: She is a retired .  She has 2 children.  She is a never smoker.  Rare alcohol use.  No recreational drugs.    Family History: All 5 maternal uncles had coronary disease.  No family history of premature coronary artery disease, arrhythmias, cardiomyopathies, or sudden cardiac death.    Review of Systems: A complete 14-point review of systems is negative, except as noted in the HPI.    Exam:  Objective   Vitals:    01/12/22 1050   BP: 102/74   Pulse: 67   SpO2: 96%     Body mass index is 25.13 kg/m².  Constitutional: Appears comfortable.   Eyes: No icterus.   ENT: Deferred. Patient wearing a mask.   Neck: No jugular venous distention. Supple, normal range of motion.  Vascular: No carotid bruits. Radial pulses intact and equal.  Cardiac: Normal S1 and S2, regular rhythm. No murmurs, rubs, or gallops appreciated.  Lungs: Clear to auscultation bilaterally.   GI: Soft, nontender, normoactive bowel sounds.  Extremities: Warm. No lower extremity edema.   Skin: Dry. No jaundice.   Musculoskeletal: No joint swelling or erythema.   Neurologic: Awake, alert, oriented.  Moving all extremities.  Psychiatric: No agitation.    Wt Readings from Last 3 Encounters:   01/12/22 60.3 kg (133 lb)   07/13/21 63 kg (139 lb)   02/25/21 65.3 kg (144 lb)     Labs: Personally reviewed and discussed with the patient.  Notable for the following.   Lab Results   Component Value Date    LDLCALC 87 01/06/2022    CHOL 192 01/06/2022    TRIG 70 01/06/2022    HDL 91 01/06/2022     01/06/2022    K 4.4 01/06/2022    BUN 17 01/06/2022    CREATININE 0.8 01/06/2022    WBC 5.77 06/18/2019    HGB 14.0 06/18/2019     06/18/2019     Cardiovascular Studies:   1.  Stress echocardiogram, 10/2018: Negative for ischemia.  Fair-good exercise tolerance. Rest echo-normal LV  wall thickness, LVEF 65%, normal RV systolic function, no significant valvular disease noted.  Ascending aorta 3.8 cm.  No mention of the aortic valve.  2.  CT chest, 6/2019: Ascending aorta 4 cm  3.  TTE, 2/21: Normal LV size, focal basal septal hypertrophy, LVEF 65%, no WMAs. Normal RV size/function. Tricuspid aortic valve. Ascending aorta 4 cm.     ECG from today personally reviewed and discussed with the patient shows sinus rhythm, low voltage, nonspecific T wave changes.  No significant change compared with tracing from 7/13/2021.    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  Dr. Khalil previously suspected additional nerve damage may have developed resulting in dysfunction of her upper airway musculature and producing her exertional dyspnea.  She has no signs of heart failure.       Coronary calcification incidentally noted on CT scan 6/19:  She had a negative stress test for ischemia in October 2018.  No new anginal symptoms.  She will remain on aspirin and rosuvastatin.     Hypercholesterolemia:  Her LDL remains above goal despite improvements in her diet and exercise routine.  We will increase rosuvastatin to 20 mg daily.  She will contact me if she develops any side effects.  We will repeat a lipid panel before her follow-up visit.      Abnormal ECG:  She has low voltage on her electrocardiogram but no findings on her echocardiogram to support an infiltrative process, pericardial effusion and no findings on her CT scan to explain the low voltage.  Her dizziness is intermittent and not associated with other cardiopulmonary symptoms.  I recommend that she continue to monitor this, contacting us for persistent or progressive symptoms.     Mildly dilated ascending aorta (CT, 4 cm '19):  She was referred to our aortic center for further monitoring, but has  not yet made an appointment.  Blood pressure well controlled.  Her recent echocardiogram revealed a tricuspid aortic valve with stable ascending aortic dimension.  Will require annual surveillance.     TIA 11/19:  No new focal neurologic symptoms.  Blood pressure in target range.  LDL management as above.      It was my pleasure to visit with Yisel Lyle in clinic today.  She will follow up with me in 6 months.  Please do not hesitate to contact me with any questions.      Sincerely,         ________________  Albert Dale MD

## 2022-02-03 ENCOUNTER — TELEPHONE (OUTPATIENT)
Dept: SCHEDULING | Facility: CLINIC | Age: 72
End: 2022-02-03
Payer: MEDICARE

## 2022-02-03 DIAGNOSIS — I71.20 THORACIC AORTIC ANEURYSM WITHOUT RUPTURE (CMS/HCC): Primary | ICD-10-CM

## 2022-02-03 RX ORDER — PREDNISONE 50 MG/1
50 TABLET ORAL SEE ADMIN INSTRUCTIONS
Qty: 3 TABLET | Refills: 3 | Status: SHIPPED | OUTPATIENT
Start: 2022-02-03 | End: 2022-09-20

## 2022-02-03 RX ORDER — DIPHENHYDRAMINE HCL 50 MG
50 CAPSULE ORAL SEE ADMIN INSTRUCTIONS
Qty: 1 CAPSULE | Refills: 3 | Status: SHIPPED | OUTPATIENT
Start: 2022-02-03 | End: 2024-04-26

## 2022-02-03 NOTE — TELEPHONE ENCOUNTER
Spoke to pt. She is not willing to do any studies with IV contrast dye. She absolutely refuses even with a prep. She states she can do an echo, an MRI or a CT without IV contrast dye. Advised I will check and call her back.

## 2022-02-03 NOTE — TELEPHONE ENCOUNTER
Please book on a Thursday that Dr. Haley is available. I've entered orders for cta coronary and valve fxn study and labs. I've also sent a prep as I see she has a dye allergy, to her pharmacy. Thanks!

## 2022-02-03 NOTE — TELEPHONE ENCOUNTER
"Pt calling to Set up an Appt with Aortic center.     Pt is Being Referred by Dr. Dale  For : \"Further monitoring, but has not yet made an appointment.  Blood pressure well controlled.  Her recent echocardiogram revealed a tricuspid aortic valve with stable ascending aortic dimension.  Will require annual surveillance.\"    Pt can be reached @ [phone].     TY   "

## 2022-04-07 ENCOUNTER — HOSPITAL ENCOUNTER (OUTPATIENT)
Dept: RADIOLOGY | Facility: HOSPITAL | Age: 72
Discharge: HOME | End: 2022-04-07
Attending: PHYSICIAN ASSISTANT
Payer: MEDICARE

## 2022-04-07 DIAGNOSIS — I71.20 THORACIC AORTIC ANEURYSM WITHOUT RUPTURE (CMS/HCC): ICD-10-CM

## 2022-04-07 PROCEDURE — C8910 MRA W/O CONT, CHEST: HCPCS | Mod: ME

## 2022-04-20 NOTE — PROGRESS NOTES
[facility]  Dr. Duglas Haley [facility] Chief Cardiothoracic Surgery  [facility]  [phone]     Reason for visit: ascending aortic aneurysm    Referring Provider: Cardiologist: Dr. Albert Dale  PCP: Aylin Madden MD   HPI    Yisel Lyle is a 72 y.o. female with PMHx known ascending aortic aneurysm (4cm in 2019 on CT chest), TIA 2019, asthma, GERD, RACHELL on CPAP, osteopenia, vocal cord paralysis following anterior cervical discectomy with fusion who presents today for aneurysm surveillance. Cardiac MRA on 4/7/22 reveals stable ascending aortic dilatation measuring 3.8cm x 3.9 cm. Pt has a contrast dye allergy, requested MRI over CT with prep. Pt denies family history of aneurysms. She is a non smoker. She does not lift anything over 30 pounds since her anterior cervical fusion. She reports blood pressure is well controlled.     Testing summarized below:    Cardiac MRA 4/7/22:  The ascending thoracic aorta measures 3.8 x 3.9 cm. Similar to the previous  study allowing for technical differences.  The proximal descending thoracic aorta measures 1.8 cm.  There is some motion limited evaluation of the aortic sinus which measures  approximately 3.3 cm in diameter.    TTE 2/25/21:  · The left ventricle is normal in size. There is normal wall thickness. There is focal basal septal hypertrophy. There is normal left ventricular systolic function. Left ventricular ejection fraction measures 65% by visual estimate. There is normal wall motion. Normal diastolic filling.  · The right ventricle is normal in size. There is normal right ventricular systolic function.  · The left atrium is normal in size.  · The right atrium is normal in size. Unable to adequately assess for ASD/PFO by color Doppler.  · The aortic valve has three cusps. The leaflets are mildly thickened. There is no aortic stenosis. There is no aortic regurgitation.  · The mitral valve is normal. There is trace mitral  regurgitation.  · The tricuspid valve is normal. There is trace tricuspid valve regurgitation. The jet is insufficient to estimate right ventricular systolic pressure.  · The pulmonic valve is not well seen.  · The aortic root is normal in size. There is limited visualization of the ascending aorta with a maximum dimension of 4 cm. The aortic arch is not well visualized.    Labs 1/6/22: creat 0.8       Past Medical History:   Diagnosis Date   • Abnormal ECG    • Ascending aorta enlargement (CMS/HCC) 7/17/2019    The ascending aorta is mildly enlarged measuring approximately 4 cm diameter.  (6/6/2019)   • Asthma    • Coronary artery calcification seen on CT scan 7/17/2019    Mild coronary artery calcifications on CT of chest (6/6/2019)   • GERD (gastroesophageal reflux disease)    • Obstructive sleep apnea syndrome 1/15/2020   • Opacity of lung on imaging study 7/17/2019    8mm subsolid opacity in the posterior right upper lobe, which may be inflammatory in nature (noted on CT of chest 6/6/2019)   • Osteopenia 7/23/2020   • Shortness of breath 7/17/2019   • Stroke (CMS/HCC)    • Vocal cord paralysis     compliacation of anterior cervical discectomy with fusion     Past Surgical History:   Procedure Laterality Date   • ANTERIOR CERVICAL DISCECTOMY W/ FUSION  10/2018   • CATARACT EXTRACTION W/  INTRAOCULAR LENS IMPLANT Left 2017   • COLONOSCOPY W/ POLYPECTOMY      colonoscopies every 3 years   • EYE SURGERY Left 2017    fror replacement of wrong lens from cataract surgery 2 weeks prior   • REDUCTION MAMMAPLASTY Bilateral 1988   • SKIN BIOPSY     • TONSILLECTOMY  1976   • VOCAL CORD INJECTION Right 03/2019    for paralysis after acdf     Iodine and iodide containing products, Venom-honey bee, Anesthesia s/i-40 (propofol) [propofol], and Tree nut  Current Outpatient Medications   Medication Sig Dispense Refill   • albuterol HFA (VENTOLIN HFA) 90 mcg/actuation inhaler 2 puffs daily and an additional dose as needed for  wheezing or shortness of breath.       • ascorbic acid (VITAMIN C ORAL) Take 250 mg by mouth daily.     • aspirin 81 mg enteric coated tablet Take 81 mg by mouth daily.     • BROVANA 15 mcg/2 mL nebulizer solution 2 (two) times a day.     • cholecalciferol, vitamin D3, 50 mcg (2,000 unit) capsule Take 2,000 Units by mouth daily. 2 capsules 4000 IU daily      • famotidine (PEPCID) 20 mg tablet Take 20 mg by mouth daily.     • fexofenadine (ALLEGRA) 180 mg tablet Take 180 mg by mouth daily.       • fluticasone propionate (FLONASE) 50 mcg/actuation nasal spray Administer 2 sprays into each nostril daily.     • methenamine (HIPREX) 1 gram tablet TAKE 1 TABLET BY MOUTH TWO TIMES A DAY . START TAKING AFTER MACROBID     • rosuvastatin 20 mg tablet Take 1 tablet (20 mg total) by mouth daily. 90 tablet 1   • SYMBICORT 160-4.5 mcg/actuation inhaler      • acetaminophen (TYLENOL) 500 mg tablet Take 1,000 mg by mouth daily as needed for mild pain.     • diphenhydrAMINE (BENADRYL) 50 mg capsule Take 1 capsule (50 mg total) by mouth See admin instr. 1 hour prior to study (Patient not taking: Reported on 4/21/2022) 1 capsule 3   • predniSONE (DELTASONE) 50 mg tablet Take 1 tablet (50 mg total) by mouth See admin instr. 1 tab at 13 hrs, 1 at 7 hrs, 1 at one hour prior to study (Patient not taking: Reported on 4/21/2022) 3 tablet 3     No current facility-administered medications for this visit.     Social History     Socioeconomic History   • Marital status:      Spouse name: None   • Number of children: None   • Years of education: None   • Highest education level: None   Tobacco Use   • Smoking status: Never Smoker   • Smokeless tobacco: Never Used   Vaping Use   • Vaping Use: Never used   Substance and Sexual Activity   • Alcohol use: Yes     Comment: rare   • Drug use: No   • Sexual activity: Not Currently     Partners: Male     Family History   Problem Relation Age of Onset   • Alzheimer's disease Biological Mother     • Colon cancer Biological Father    • BEVERLY disease Biological Sister    • Coronary artery disease Mother's Brother         in all 5 maternal uncles   • No Known Problems Biological Son    • No Known Problems Biological Son         Current Outpatient Medications:   •  albuterol HFA (VENTOLIN HFA) 90 mcg/actuation inhaler, 2 puffs daily and an additional dose as needed for wheezing or shortness of breath.  , Disp: , Rfl:   •  ascorbic acid (VITAMIN C ORAL), Take 250 mg by mouth daily., Disp: , Rfl:   •  aspirin 81 mg enteric coated tablet, Take 81 mg by mouth daily., Disp: , Rfl:   •  BROVANA 15 mcg/2 mL nebulizer solution, 2 (two) times a day., Disp: , Rfl:   •  cholecalciferol, vitamin D3, 50 mcg (2,000 unit) capsule, Take 2,000 Units by mouth daily. 2 capsules 4000 IU daily , Disp: , Rfl:   •  famotidine (PEPCID) 20 mg tablet, Take 20 mg by mouth daily., Disp: , Rfl:   •  fexofenadine (ALLEGRA) 180 mg tablet, Take 180 mg by mouth daily.  , Disp: , Rfl:   •  fluticasone propionate (FLONASE) 50 mcg/actuation nasal spray, Administer 2 sprays into each nostril daily., Disp: , Rfl:   •  methenamine (HIPREX) 1 gram tablet, TAKE 1 TABLET BY MOUTH TWO TIMES A DAY . START TAKING AFTER MACROBID, Disp: , Rfl:   •  rosuvastatin 20 mg tablet, Take 1 tablet (20 mg total) by mouth daily., Disp: 90 tablet, Rfl: 1  •  SYMBICORT 160-4.5 mcg/actuation inhaler, , Disp: , Rfl:   •  acetaminophen (TYLENOL) 500 mg tablet, Take 1,000 mg by mouth daily as needed for mild pain., Disp: , Rfl:   •  diphenhydrAMINE (BENADRYL) 50 mg capsule, Take 1 capsule (50 mg total) by mouth See admin instr. 1 hour prior to study (Patient not taking: Reported on 4/21/2022), Disp: 1 capsule, Rfl: 3  •  predniSONE (DELTASONE) 50 mg tablet, Take 1 tablet (50 mg total) by mouth See admin instr. 1 tab at 13 hrs, 1 at 7 hrs, 1 at one hour prior to study (Patient not taking: Reported on 4/21/2022), Disp: 3 tablet, Rfl: 3    Review of Systems   Constitutional:  Negative.   HENT: Negative.    Eyes: Negative.    Cardiovascular: Negative for chest pain.   Respiratory: Negative.    Endocrine: Negative.    Hematologic/Lymphatic: Negative.    Skin:        Skin cancer right arm    Musculoskeletal: Negative.    Gastrointestinal: Negative.    Genitourinary: Negative.    Neurological: Negative.    Psychiatric/Behavioral: Negative.       Objective    Vitals:    04/21/22 1317   BP: 118/72   Pulse: 66   Resp: 16   Temp: (!) 35.7 °C (96.2 °F)   SpO2: 96%      Physical Exam  Constitutional:       General: She is not in acute distress.     Appearance: Normal appearance. She is normal weight.   HENT:      Head: Normocephalic and atraumatic.   Eyes:      Extraocular Movements: Extraocular movements intact.      Conjunctiva/sclera: Conjunctivae normal.   Cardiovascular:      Rate and Rhythm: Normal rate and regular rhythm.      Heart sounds: Normal heart sounds.   Pulmonary:      Effort: Pulmonary effort is normal. No respiratory distress.      Breath sounds: Normal breath sounds.   Musculoskeletal:         General: Normal range of motion.      Right lower leg: No edema.      Left lower leg: No edema.   Skin:     General: Skin is warm and dry.   Neurological:      General: No focal deficit present.      Mental Status: She is alert and oriented to person, place, and time.   Psychiatric:         Mood and Affect: Mood normal.         Behavior: Behavior normal.         Thought Content: Thought content normal.           Imaging  See HPI for pertinent  All imaging reviewed by myself and Dr. Haley     Assessment/Plan    Assessment/Plan     Assessment: Thoracic Aortic Aneurysm  Yisel Lyle is a 72 y.o. female seen today in consultation for ascending aortic aneurysm. Pt hs a contrast dye allergy, requested MRA instead of CT. MRA shows Ascending aorta measures 3.8 x 3.9 cm.  The patient remains asymptomatic at this time. She currently denies chest pain, shortness of breath, dyspnea with exertion,  fatigue, lightheadedness, dizziness, and syncope. She denies any family history of cardiac disease including aortic aneurysm, valvular pathology, and sudden death.    Plan:  Surgical intervention is not warranted at this time. Dr. Haley recommends the patient return to our [facility] in 1 year for continued surveillance with cardiac MRI.  Yisel Lyle was educated on proper aortic precautions to take from this point forward. She was asked to maintain adequate blood pressure control (with systolic <140) and to avoid lifting anything in excess of 40 lbs.  Additionally, the patient was asked to continue to refrain from smoking as this could exacerbate their current condition.  Lastly, in the unlikely event that the patient experience severe tearing/ripping chest or back pain, she was instructed to go to the nearest emergency department immediately and inform the team that they are under Dr. Haley’s care.     All questions were answered and the patient verbalized understanding. Yisel Lyle was encouraged to call us in the interim with any additional questions or concerns.      I, THOMAS Marie, am scribing for and in the presence of Dr. Duglas Haley.    Thank you for the opportunity participate in this patient's care.  Please call [phone] with any questions or concerns.     THOMAS Marie 4/21/2022  1:50 PM

## 2022-04-21 ENCOUNTER — CONSULT (OUTPATIENT)
Dept: CARDIOTHORACIC SURGERY | Facility: CLINIC | Age: 72
End: 2022-04-21
Payer: MEDICARE

## 2022-04-21 VITALS
RESPIRATION RATE: 16 BRPM | HEIGHT: 61 IN | OXYGEN SATURATION: 96 % | DIASTOLIC BLOOD PRESSURE: 72 MMHG | SYSTOLIC BLOOD PRESSURE: 118 MMHG | HEART RATE: 66 BPM | TEMPERATURE: 96.2 F | BODY MASS INDEX: 24.92 KG/M2 | WEIGHT: 132 LBS

## 2022-04-21 DIAGNOSIS — I71.20 THORACIC AORTIC ANEURYSM WITHOUT RUPTURE (CMS/HCC): Primary | ICD-10-CM

## 2022-04-21 PROCEDURE — 99204 OFFICE O/P NEW MOD 45 MIN: CPT | Performed by: THORACIC SURGERY (CARDIOTHORACIC VASCULAR SURGERY)

## 2022-04-21 ASSESSMENT — ENCOUNTER SYMPTOMS
NEUROLOGICAL NEGATIVE: 1
RESPIRATORY NEGATIVE: 1
HEMATOLOGIC/LYMPHATIC NEGATIVE: 1
ENDOCRINE NEGATIVE: 1
CONSTITUTIONAL NEGATIVE: 1
EYES NEGATIVE: 1
PSYCHIATRIC NEGATIVE: 1
MUSCULOSKELETAL NEGATIVE: 1
GASTROINTESTINAL NEGATIVE: 1

## 2022-09-14 ENCOUNTER — TELEPHONE (OUTPATIENT)
Dept: CARDIOLOGY | Facility: CLINIC | Age: 72
End: 2022-09-14
Payer: MEDICARE

## 2022-09-14 NOTE — TELEPHONE ENCOUNTER
[place] pt - FYI ----pt calling to confirm lab test ordered for upcoming visit.    Noted in chart Lipid panel and CMP.  Instructed fast 10-12 hours prior to lab draw.

## 2022-09-15 ENCOUNTER — APPOINTMENT (OUTPATIENT)
Dept: LAB | Facility: HOSPITAL | Age: 72
End: 2022-09-15
Attending: INTERNAL MEDICINE
Payer: MEDICARE

## 2022-09-15 DIAGNOSIS — E78.00 HYPERCHOLESTEROLEMIA: ICD-10-CM

## 2022-09-15 DIAGNOSIS — I71.20 THORACIC AORTIC ANEURYSM WITHOUT RUPTURE (CMS/HCC): ICD-10-CM

## 2022-09-15 DIAGNOSIS — I25.10 CORONARY ARTERY CALCIFICATION SEEN ON CT SCAN: ICD-10-CM

## 2022-09-15 LAB
ALBUMIN SERPL-MCNC: 3.9 G/DL (ref 3.4–5)
ALP SERPL-CCNC: 50 IU/L (ref 35–126)
ALT SERPL-CCNC: 33 IU/L (ref 11–54)
ANION GAP SERPL CALC-SCNC: 9 MEQ/L (ref 3–15)
AST SERPL-CCNC: 28 IU/L (ref 15–41)
BILIRUB SERPL-MCNC: 0.8 MG/DL (ref 0.3–1.2)
BUN SERPL-MCNC: 20 MG/DL (ref 8–20)
CALCIUM SERPL-MCNC: 9.6 MG/DL (ref 8.9–10.3)
CHLORIDE SERPL-SCNC: 102 MEQ/L (ref 98–109)
CHOLEST SERPL-MCNC: 158 MG/DL
CO2 SERPL-SCNC: 28 MEQ/L (ref 22–32)
CREAT SERPL-MCNC: 0.8 MG/DL (ref 0.6–1.1)
GFR SERPL CREATININE-BSD FRML MDRD: >60 ML/MIN/1.73M*2
GLUCOSE SERPL-MCNC: 81 MG/DL (ref 70–99)
HDLC SERPL-MCNC: 78 MG/DL
HDLC SERPL: 2 {RATIO}
LDLC SERPL CALC-MCNC: 71 MG/DL
NONHDLC SERPL-MCNC: 80 MG/DL
POTASSIUM SERPL-SCNC: 4.4 MEQ/L (ref 3.6–5.1)
PROT SERPL-MCNC: 6 G/DL (ref 6–8.2)
SODIUM SERPL-SCNC: 139 MEQ/L (ref 136–144)
TRIGL SERPL-MCNC: 46 MG/DL (ref 30–149)

## 2022-09-15 PROCEDURE — 36415 COLL VENOUS BLD VENIPUNCTURE: CPT

## 2022-09-15 PROCEDURE — 80053 COMPREHEN METABOLIC PANEL: CPT

## 2022-09-15 PROCEDURE — 80061 LIPID PANEL: CPT

## 2022-09-20 ENCOUNTER — OFFICE VISIT (OUTPATIENT)
Dept: CARDIOLOGY | Facility: CLINIC | Age: 72
End: 2022-09-20
Payer: MEDICARE

## 2022-09-20 VITALS
WEIGHT: 131 LBS | HEIGHT: 61 IN | OXYGEN SATURATION: 95 % | DIASTOLIC BLOOD PRESSURE: 72 MMHG | SYSTOLIC BLOOD PRESSURE: 114 MMHG | BODY MASS INDEX: 24.73 KG/M2 | HEART RATE: 70 BPM

## 2022-09-20 DIAGNOSIS — R94.31 ABNORMAL EKG: ICD-10-CM

## 2022-09-20 DIAGNOSIS — E78.00 HYPERCHOLESTEROLEMIA: ICD-10-CM

## 2022-09-20 DIAGNOSIS — I25.10 CORONARY ARTERY CALCIFICATION SEEN ON CT SCAN: Primary | ICD-10-CM

## 2022-09-20 DIAGNOSIS — R06.02 SHORTNESS OF BREATH: ICD-10-CM

## 2022-09-20 DIAGNOSIS — I77.89 ASCENDING AORTA ENLARGEMENT (CMS/HCC): ICD-10-CM

## 2022-09-20 DIAGNOSIS — G45.9 TIA (TRANSIENT ISCHEMIC ATTACK): ICD-10-CM

## 2022-09-20 DIAGNOSIS — J38.00 VOCAL CORD PARALYSIS: ICD-10-CM

## 2022-09-20 PROCEDURE — 99214 OFFICE O/P EST MOD 30 MIN: CPT | Performed by: INTERNAL MEDICINE

## 2022-09-20 PROCEDURE — 93000 ELECTROCARDIOGRAM COMPLETE: CPT | Performed by: INTERNAL MEDICINE

## 2022-09-20 NOTE — PROGRESS NOTES
Albert Dale MD  Cardiology    [facility]    [facility]  [facility] [address]  [address] [place] [address]    TEL  [phone]  [facility]     09/20/22    Dear Dr. Madden:    It was my pleasure to see Yisel Lyle at the [facility] today for follow-up.    As you know, she is a 72 y.o. female with a history of cervical arthropathy status post anterior cervical discectomy and fusion complicated by vocal cord paralysis, laryngeal spasm and dyspnea, obstructive sleep apnea on CPAP therapy, a probable TIA in November 2019, coronary calcifications, and a mildly enlarged ascending aorta (4 cm).     We increased rosuvastatin to 20mg at her last visit. The [facility] recommended 1 year follow up MRA. She has done well since her last visit.  She reports no new neurologic symptoms.  She reports no new cardiopulmonary symptoms.  No chest pain or pressure.  Dyspnea remains intermittent, but not progressing.  No lower extremity edema.  No orthopnea or PND.  No palpitations or syncope.  No bleeding issues on aspirin.  She has been walking 13154 steps per day.  Her diet has improved.  Compliant with her medications.  No side effects.    Past Medical History:  Past Medical History:   Diagnosis Date    Abnormal ECG     Ascending aorta enlargement (CMS/HCC) 7/17/2019    The ascending aorta is mildly enlarged measuring approximately 4 cm diameter.  (6/6/2019)    Asthma     Coronary artery calcification seen on CT scan 7/17/2019    Mild coronary artery calcifications on CT of chest (6/6/2019)    GERD (gastroesophageal reflux disease)     Obstructive sleep apnea syndrome 1/15/2020    Opacity of lung on imaging study 7/17/2019    8mm subsolid opacity in the posterior right upper lobe, which may be inflammatory in nature (noted on CT of chest 6/6/2019)    Osteopenia 7/23/2020    Shortness of breath 7/17/2019    Stroke (CMS/HCC)     Vocal  cord paralysis     compliacation of anterior cervical discectomy with fusion       Past Surgical History:  Past Surgical History:   Procedure Laterality Date    ANTERIOR CERVICAL DISCECTOMY W/ FUSION  10/2018    CATARACT EXTRACTION W/  INTRAOCULAR LENS IMPLANT Left 2017    COLONOSCOPY W/ POLYPECTOMY      colonoscopies every 3 years    EYE SURGERY Left 2017    fror replacement of wrong lens from cataract surgery 2 weeks prior    REDUCTION MAMMAPLASTY Bilateral 1988    SKIN BIOPSY      TONSILLECTOMY  1976    VOCAL CORD INJECTION Right 03/2019    for paralysis after acdf       Medications:  Current Outpatient Medications   Medication Sig Dispense Refill    acetaminophen (TYLENOL) 500 mg tablet Take 1,000 mg by mouth daily as needed for mild pain.      albuterol HFA (VENTOLIN HFA) 90 mcg/actuation inhaler 2 puffs daily and an additional dose as needed for wheezing or shortness of breath.        ascorbic acid (VITAMIN C ORAL) Take 250 mg by mouth daily.      aspirin 81 mg enteric coated tablet Take 81 mg by mouth daily.      BROVANA 15 mcg/2 mL nebulizer solution 2 (two) times a day.      cholecalciferol, vitamin D3, 50 mcg (2,000 unit) capsule Take 2,000 Units by mouth daily. 2 capsules 4000 IU daily       diphenhydrAMINE (BENADRYL) 50 mg capsule Take 1 capsule (50 mg total) by mouth See admin instr. 1 hour prior to study (Patient taking differently: Take 50 mg by mouth See admin instr. As needed) 1 capsule 3    famotidine (PEPCID) 20 mg tablet Take 20 mg by mouth daily.      fexofenadine (ALLEGRA) 180 mg tablet Take 180 mg by mouth daily.        fluticasone propionate (FLONASE) 50 mcg/actuation nasal spray Administer 2 sprays into each nostril daily.      rosuvastatin (CRESTOR) 20 mg tablet Take 1 tablet (20 mg total) by mouth daily. 90 tablet 3    SYMBICORT 160-4.5 mcg/actuation inhaler       methenamine (HIPREX) 1 gram tablet TAKE 1 TABLET BY MOUTH TWO TIMES A DAY . START TAKING AFTER MACROBID        No current facility-administered medications for this visit.       Allergies: Iodine and iodide containing products, Venom-honey bee, Anesthesia s/i-40 (propofol) [propofol], and Tree nut    Social History: She is a retired .  She has 2 children.  She is a never smoker.  Rare alcohol use.  No recreational drugs.    Family History: All 5 maternal uncles had coronary disease.  No family history of premature coronary artery disease, arrhythmias, cardiomyopathies, or sudden cardiac death.    Review of Systems: A complete 14-point review of systems is negative, except as noted in the HPI.    Exam:  Objective   Vitals:    09/20/22 1422   BP: 114/72   Pulse: 70   SpO2: 95%     Body mass index is 24.75 kg/m².  Constitutional: Appears comfortable.   Eyes: No icterus.   ENT: Deferred. Patient wearing a mask.   Neck: No jugular venous distention.   Vascular: No carotid bruits. Radial pulses intact and equal.  Cardiac: Normal S1 and S2, regular rhythm. No murmurs, rubs, or gallops appreciated.  Lungs: Clear to auscultation bilaterally.   GI: Soft, normoactive bowel sounds.  Extremities: Warm. No lower extremity edema.   Skin: Dry.   Neurologic: Awake, alert, oriented.    Psychiatric: No agitation.    Wt Readings from Last 3 Encounters:   09/20/22 59.4 kg (131 lb)   04/21/22 59.9 kg (132 lb)   04/07/22 59 kg (130 lb)     Labs: Personally reviewed and discussed with the patient.  Notable for the following.   Lab Results   Component Value Date    LDLCALC 71 09/15/2022    CHOL 158 09/15/2022    TRIG 46 09/15/2022    HDL 78 09/15/2022     09/15/2022    K 4.4 09/15/2022    BUN 20 09/15/2022    CREATININE 0.8 09/15/2022    WBC 5.77 06/18/2019    HGB 14.0 06/18/2019     06/18/2019     Cardiovascular Studies:   1.  Stress echocardiogram, 10/2018: Negative for ischemia.  Fair-good exercise tolerance. Rest echo-normal LV wall thickness, LVEF 65%, normal RV systolic function, no significant valvular  disease noted.  Ascending aorta 3.8 cm.  No mention of the aortic valve.  2.  CT chest, 6/2019: Ascending aorta 4 cm  3.  TTE, 2/21: Normal LV size, focal basal septal hypertrophy, LVEF 65%, no WMAs. Normal RV size/function. Tricuspid aortic valve. Ascending aorta 4 cm.   4.  Chest MRI (no contrast), 4/2022: Ascending aorta 39 mm.    ECG from today personally reviewed and discussed with the patient shows sinus rhythm, low voltage, nonspecific T wave changes.  No significant change compared with tracing from 1/12/2022.    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  Dr. Khalil previously suspected additional nerve damage may have developed resulting in dysfunction of her upper airway musculature and producing her exertional dyspnea.  She has no signs of heart failure.       Coronary calcification incidentally noted on CT scan 6/19:  She had a negative stress test for ischemia in October 2018.  No new anginal symptoms.  She will remain on aspirin and rosuvastatin.     Hypercholesterolemia:  Her LDL has improved with the increased dose of rosuvastatin.  She continues to work on lifestyle modification.  We will therefore continue her current regimen for now.  We will repeat a lipid panel before her follow-up visit.  We can consider dose titration at that time, as needed.      Abnormal ECG:  She has low voltage on her electrocardiogram but no findings on her echocardiogram to support an infiltrative process, pericardial effusion and no findings on her CT scan to explain the low voltage.  She is otherwise asymptomatic.    Mildly dilated ascending aorta:  She continues to follow with the aortic center.  Plan is for a repeat MRA in 1 year.  Her echocardiogram showed a tricuspid aortic valve.     TIA 11/19:  No new focal neurologic symptoms.  Blood pressure in target range.   LDL management as above.      It was my pleasure to visit with Yisel Lyle in clinic today.  She will follow up with me in 6 months.  Please do not hesitate to contact me with any questions.      Sincerely,         ________________  Albert Dale MD

## 2023-02-24 ENCOUNTER — APPOINTMENT (OUTPATIENT)
Dept: LAB | Facility: HOSPITAL | Age: 73
End: 2023-02-24
Attending: INTERNAL MEDICINE
Payer: MEDICARE

## 2023-02-24 ENCOUNTER — TRANSCRIBE ORDERS (OUTPATIENT)
Dept: LAB | Facility: HOSPITAL | Age: 73
End: 2023-02-24

## 2023-02-24 DIAGNOSIS — T78.05XA ANAPHYLACTIC REACTION DUE TO TREE NUTS AND SEEDS, INITIAL ENCOUNTER: Primary | ICD-10-CM

## 2023-02-24 DIAGNOSIS — I25.10 CORONARY ARTERY CALCIFICATION SEEN ON CT SCAN: ICD-10-CM

## 2023-02-24 DIAGNOSIS — T78.05XA ANAPHYLACTIC REACTION DUE TO TREE NUTS AND SEEDS, INITIAL ENCOUNTER: ICD-10-CM

## 2023-02-24 LAB
ALBUMIN SERPL-MCNC: 4.3 G/DL (ref 3.4–5)
ALP SERPL-CCNC: 52 IU/L (ref 35–126)
ALT SERPL-CCNC: 22 IU/L (ref 11–54)
ANION GAP SERPL CALC-SCNC: 12 MEQ/L (ref 3–15)
AST SERPL-CCNC: 24 IU/L (ref 15–41)
BILIRUB SERPL-MCNC: 1.2 MG/DL (ref 0.3–1.2)
BUN SERPL-MCNC: 16 MG/DL (ref 8–20)
CALCIUM SERPL-MCNC: 10.2 MG/DL (ref 8.9–10.3)
CHLORIDE SERPL-SCNC: 104 MEQ/L (ref 98–109)
CHOLEST SERPL-MCNC: 177 MG/DL
CO2 SERPL-SCNC: 27 MEQ/L (ref 22–32)
CREAT SERPL-MCNC: 0.8 MG/DL (ref 0.6–1.1)
GFR SERPL CREATININE-BSD FRML MDRD: >60 ML/MIN/1.73M*2
GLUCOSE SERPL-MCNC: 91 MG/DL (ref 70–99)
HDLC SERPL-MCNC: 85 MG/DL
HDLC SERPL: 2.1 {RATIO}
LDLC SERPL CALC-MCNC: 80 MG/DL
NONHDLC SERPL-MCNC: 92 MG/DL
POTASSIUM SERPL-SCNC: 5.8 MEQ/L (ref 3.6–5.1)
PROT SERPL-MCNC: 6.7 G/DL (ref 6–8.2)
SODIUM SERPL-SCNC: 143 MEQ/L (ref 136–144)
TRIGL SERPL-MCNC: 58 MG/DL (ref 30–149)

## 2023-02-24 PROCEDURE — 86003 ALLG SPEC IGE CRUDE XTRC EA: CPT | Mod: 59

## 2023-02-24 PROCEDURE — 86003 ALLG SPEC IGE CRUDE XTRC EA: CPT

## 2023-02-24 PROCEDURE — 86008 ALLG SPEC IGE RECOMB EA: CPT | Mod: 59

## 2023-02-24 PROCEDURE — 36415 COLL VENOUS BLD VENIPUNCTURE: CPT

## 2023-02-24 PROCEDURE — 80061 LIPID PANEL: CPT

## 2023-02-24 PROCEDURE — 30000001 HC MISCELLANEOUS TEST: Mod: 59

## 2023-02-24 PROCEDURE — 30000001 MISCELLANEOUS LAB TEST (NOT TO BE USED FOR COVID19): Mod: 59

## 2023-02-24 PROCEDURE — 80053 COMPREHEN METABOLIC PANEL: CPT

## 2023-02-25 ENCOUNTER — TELEPHONE (OUTPATIENT)
Dept: CARDIOLOGY | Facility: CLINIC | Age: 73
End: 2023-02-25
Payer: MEDICARE

## 2023-02-25 DIAGNOSIS — E87.5 HYPERKALEMIA: Primary | ICD-10-CM

## 2023-02-25 NOTE — TELEPHONE ENCOUNTER
Spoke with patient about labs - K is 5.8. No recent medication changes. None of her cardiac medications should cause hyperkalemia. No changes to diet. She has no new symptoms. No muscle weakness, cramping, fatigue, ect. Recommended that she decrease her potassium consumption, stay hydrated, and repeat potassium on Monday. Lab ordered. Told her present to ER for any new symptoms that could be consistent with hyperkalemia. She agrees with plan. All questions answered.

## 2023-02-28 ENCOUNTER — APPOINTMENT (OUTPATIENT)
Dept: LAB | Facility: HOSPITAL | Age: 73
End: 2023-02-28
Attending: INTERNAL MEDICINE
Payer: MEDICARE

## 2023-02-28 DIAGNOSIS — E87.5 HYPERKALEMIA: ICD-10-CM

## 2023-02-28 LAB — POTASSIUM SERPL-SCNC: 4.5 MEQ/L (ref 3.6–5.1)

## 2023-02-28 PROCEDURE — 36415 COLL VENOUS BLD VENIPUNCTURE: CPT

## 2023-02-28 PROCEDURE — 84132 ASSAY OF SERUM POTASSIUM: CPT

## 2023-03-01 LAB
ALMOND IGE QN: <0.1 KU/L
MACADAMIA IGE QN: <0.1 KU/L
PEANUT (RARA H) 1 IGE QN: <0.1 KU/L
PEANUT (RARA H) 2 IGE QN: <0.1 KU/L
PEANUT (RARA H) 3 IGE QN: <0.1 KU/L
PEANUT (RARA H) 8 IGE QN: <0.1 KU/L
PEANUT (RARA H) 9 IGE QN: <0.1 KU/L
PEANUT IGE QN: <0.1 KU/L
PECAN/HICK NUT IGE QN: <0.1 KU/L
PISTACHIO IGE QN: <0.1 KU/L

## 2023-03-03 LAB — LABORATORY REPORT: NORMAL

## 2023-03-07 ENCOUNTER — OFFICE VISIT (OUTPATIENT)
Dept: CARDIOLOGY | Facility: CLINIC | Age: 73
End: 2023-03-07
Payer: MEDICARE

## 2023-03-07 ENCOUNTER — TELEPHONE (OUTPATIENT)
Dept: CARDIOLOGY | Facility: CLINIC | Age: 73
End: 2023-03-07

## 2023-03-07 ENCOUNTER — TELEPHONE (OUTPATIENT)
Dept: CARDIOTHORACIC SURGERY | Facility: CLINIC | Age: 73
End: 2023-03-07
Payer: MEDICARE

## 2023-03-07 VITALS
HEART RATE: 69 BPM | WEIGHT: 130 LBS | HEIGHT: 61 IN | SYSTOLIC BLOOD PRESSURE: 122 MMHG | OXYGEN SATURATION: 99 % | BODY MASS INDEX: 24.55 KG/M2 | DIASTOLIC BLOOD PRESSURE: 84 MMHG

## 2023-03-07 DIAGNOSIS — I25.10 CORONARY ARTERY CALCIFICATION SEEN ON CT SCAN: Primary | ICD-10-CM

## 2023-03-07 DIAGNOSIS — R06.02 SHORTNESS OF BREATH: ICD-10-CM

## 2023-03-07 DIAGNOSIS — I71.9 AORTIC ANEURYSM WITHOUT RUPTURE, UNSPECIFIED PORTION OF AORTA (CMS/HCC): Primary | ICD-10-CM

## 2023-03-07 DIAGNOSIS — J38.00 VOCAL CORD PARALYSIS: ICD-10-CM

## 2023-03-07 DIAGNOSIS — I77.89 ASCENDING AORTA ENLARGEMENT (CMS/HCC): ICD-10-CM

## 2023-03-07 DIAGNOSIS — R60.9 EDEMA, UNSPECIFIED TYPE: Primary | ICD-10-CM

## 2023-03-07 DIAGNOSIS — R94.31 ABNORMAL EKG: ICD-10-CM

## 2023-03-07 DIAGNOSIS — E78.00 HYPERCHOLESTEROLEMIA: ICD-10-CM

## 2023-03-07 DIAGNOSIS — G45.9 TIA (TRANSIENT ISCHEMIC ATTACK): ICD-10-CM

## 2023-03-07 PROCEDURE — 93000 ELECTROCARDIOGRAM COMPLETE: CPT | Performed by: INTERNAL MEDICINE

## 2023-03-07 PROCEDURE — 99214 OFFICE O/P EST MOD 30 MIN: CPT | Performed by: INTERNAL MEDICINE

## 2023-03-07 NOTE — PROGRESS NOTES
Albert Dale MD  Cardiology    [facility]    [facility]  [facility] [address]  [address] [place] [address]    TEL  [phone]  [facility]     03/07/23    Dear Dr. Madden:    It was my pleasure to see Yisel Lyle at the [facility] today for follow-up.    As you know, she is a 72 y.o. female with a history of cervical arthropathy status post anterior cervical discectomy and fusion complicated by vocal cord paralysis, laryngeal spasm and dyspnea, obstructive sleep apnea on CPAP therapy, TIA in November 2019, coronary calcifications, and a mildly enlarged ascending aorta (4 cm).     She has tolerated the increased dose of rosuvastatin to 20 mg daily without side effects.  She has her MRA for the aortic center scheduled in the near future. She reports no new cardiopulmonary symptoms.  No chest pain or pressure.  Dyspnea remains intermittent, but not progressing.  No significant lower extremity edema.  No orthopnea or PND.  No palpitations or syncope.  No bleeding issues on aspirin.  She has been walking 70103 steps per day.  Her diet is stable.  Compliant with her medications.  Her potassium on her initial labs before this visit was elevated.  On repeat it was normal.    Past Medical History:  Past Medical History:   Diagnosis Date   • Abnormal ECG    • Ascending aorta enlargement (CMS/HCC) 7/17/2019    The ascending aorta is mildly enlarged measuring approximately 4 cm diameter.  (6/6/2019)   • Asthma    • Coronary artery calcification seen on CT scan 7/17/2019    Mild coronary artery calcifications on CT of chest (6/6/2019)   • GERD (gastroesophageal reflux disease)    • Obstructive sleep apnea syndrome 1/15/2020   • Opacity of lung on imaging study 7/17/2019    8mm subsolid opacity in the posterior right upper lobe, which may be inflammatory in nature (noted on CT of chest 6/6/2019)   • Osteopenia 7/23/2020   • Shortness of breath  7/17/2019   • Stroke (CMS/HCC)    • Vocal cord paralysis     compliacation of anterior cervical discectomy with fusion       Past Surgical History:  Past Surgical History:   Procedure Laterality Date   • ANTERIOR CERVICAL DISCECTOMY W/ FUSION  10/2018   • CATARACT EXTRACTION W/  INTRAOCULAR LENS IMPLANT Left 2017   • COLONOSCOPY W/ POLYPECTOMY      colonoscopies every 3 years   • EYE SURGERY Left 2017    fror replacement of wrong lens from cataract surgery 2 weeks prior   • REDUCTION MAMMAPLASTY Bilateral 1988   • SKIN BIOPSY     • TONSILLECTOMY  1976   • VOCAL CORD INJECTION Right 03/2019    for paralysis after acdf       Medications:  Current Outpatient Medications   Medication Sig Dispense Refill   • albuterol HFA (VENTOLIN HFA) 90 mcg/actuation inhaler 2 puffs daily and an additional dose as needed for wheezing or shortness of breath.       • ascorbic acid (VITAMIN C ORAL) Take 250 mg by mouth daily.     • aspirin 81 mg enteric coated tablet Take 81 mg by mouth daily.     • cholecalciferol, vitamin D3, 50 mcg (2,000 unit) capsule Take 2,000 Units by mouth daily. 2 capsules 4000 IU daily      • diphenhydrAMINE (BENADRYL) 50 mg capsule Take 1 capsule (50 mg total) by mouth See admin instr. 1 hour prior to study (Patient taking differently: Take 50 mg by mouth See admin instr. As needed) 1 capsule 3   • famotidine (PEPCID) 20 mg tablet Take 20 mg by mouth daily.     • fexofenadine (ALLEGRA) 180 mg tablet Take 180 mg by mouth daily.       • fluticasone propionate (FLONASE) 50 mcg/actuation nasal spray Administer 2 sprays into each nostril daily.     • rosuvastatin (CRESTOR) 20 mg tablet Take 1 tablet (20 mg total) by mouth daily. 90 tablet 3   • SYMBICORT 160-4.5 mcg/actuation inhaler        No current facility-administered medications for this visit.       Allergies: Iodine and iodide containing products, Venom-honey bee, Anesthesia s/i-40 (propofol) [propofol], and Tree nut    Social History: She is a retired  .  She has 2 children.  She is a never smoker.  Rare alcohol use.  No recreational drugs.    Family History: All 5 maternal uncles had coronary disease.  No family history of premature coronary artery disease, arrhythmias, cardiomyopathies, or sudden cardiac death.    Review of Systems: A complete 14-point review of systems is negative, except as noted in the HPI.    Exam:  Objective   Vitals:    03/07/23 1302   BP: 122/84   Pulse: 69   SpO2: 99%     Body mass index is 24.56 kg/m².  Constitutional: Appears comfortable.   Eyes: No icterus.   ENT: Deferred. Patient wearing a mask.   Neck: No jugular venous distention.   Vascular: No carotid bruits. Radial pulses intact and equal.  Cardiac: Normal S1 and S2, regular rhythm.  1/6 murmur at the base.  No rubs or gallops appreciated.  Lungs: Clear to auscultation bilaterally.   GI: Soft, normoactive bowel sounds.  Extremities: Warm. No lower extremity edema.   Skin: Dry.   Neurologic: Awake, alert, oriented.    Psychiatric: No agitation.    Wt Readings from Last 3 Encounters:   03/07/23 59 kg (130 lb)   09/20/22 59.4 kg (131 lb)   04/21/22 59.9 kg (132 lb)     Labs: Personally reviewed and discussed with the patient.  Notable for the following.   Lab Results   Component Value Date    LDLCALC 80 02/24/2023    CHOL 177 02/24/2023    TRIG 58 02/24/2023    HDL 85 02/24/2023     02/24/2023    K 4.5 02/28/2023    BUN 16 02/24/2023    CREATININE 0.8 02/24/2023    WBC 5.77 06/18/2019    HGB 14.0 06/18/2019     06/18/2019     Lab Results   Component Value Date    ALT 22 02/24/2023    AST 24 02/24/2023    ALKPHOS 52 02/24/2023    BILITOT 1.2 02/24/2023       Cardiovascular Studies:   1.  Stress echocardiogram, 10/2018: Negative for ischemia.  Fair-good exercise tolerance. Rest echo-normal LV wall thickness, LVEF 65%, normal RV systolic function, no significant valvular disease noted.  Ascending aorta 3.8 cm.  No mention of the aortic valve.  2.  CT  chest, 6/2019: Ascending aorta 4 cm  3.  TTE, 2/21: Normal LV size, focal basal septal hypertrophy, LVEF 65%, no WMAs. Normal RV size/function. Tricuspid aortic valve. Ascending aorta 4 cm.   4.  Chest MRI (no contrast), 4/2022: Ascending aorta 39 mm.    ECG from today personally reviewed and discussed with the patient shows sinus bradycardia, low voltage.  Compared with tracing from 9/20/2022, the rate is now slower.    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  Dr. Khalil previously suspected additional nerve damage may have developed resulting in dysfunction of her upper airway musculature and producing her exertional dyspnea.  She has no signs of heart failure.  We will continue to monitor clinically.  She knows to contact me should her symptoms progress in any way.     Coronary calcification incidentally noted on CT scan 6/19:  She had a negative stress test for ischemia in October 2018.  No new anginal symptoms.  She will remain on aspirin and rosuvastatin.     Hypercholesterolemia:  Her LDL  remains above goal given her history ofTIA.  She has a strong preference to avoid any additional medication/up titration of medication dose at this time.  She will renew efforts at lifestyle change and we will repeat a lipid panel before her follow-up visit.  We can discuss medication titration at that time, as needed.  She will remain on rosuvastatin 20 mg daily.     Abnormal ECG:  She has low voltage on her electrocardiogram but no findings on her echocardiogram to support an infiltrative process, pericardial effusion and no findings on her CT scan to explain the low voltage.  She is otherwise asymptomatic.  We can discuss a repeat echocardiogram and follow-up.    Mildly dilated ascending aorta:  She continues to follow with the aortic center.  Plan is for a repeat  MRA soon.  Her echocardiogram showed a tricuspid aortic valve.     History of TIA/small vessel ischemia:  Blood pressure in target range.  LDL management as above.  I recommended that she establish care with a neurologist.      It was my pleasure to visit with Yisel in clinic today.  She will follow up with me in 6 months.  Please do not hesitate to contact me with any questions.      Sincerely,         ________________  Albert Dale MD

## 2023-03-07 NOTE — TELEPHONE ENCOUNTER
After the visit Yisel told me that she has had intermittent leg pain that she thinks is worse on the 20 mg of rosuvastatin.  She will stop the medication for 2 weeks to see if her symptoms improve.  If they do not she will go back on rosuvastatin.  If they do improve she will contact me and we will discuss next steps.  We will also check an echocardiogram in the relatively near future due to low voltage on her ECG as well as edema concerns.

## 2023-03-09 NOTE — TELEPHONE ENCOUNTER
Pt returning Karlie's call to schedule her TTE please advise [phone]     Pt declined first available

## 2023-03-09 NOTE — TELEPHONE ENCOUNTER
Called PT and spoke with her about options here. She would like to try and go to a closer facility to her, so I gave central scheduling information. TY.

## 2023-03-15 ENCOUNTER — TELEPHONE (OUTPATIENT)
Dept: SCHEDULING | Facility: CLINIC | Age: 73
End: 2023-03-15
Payer: MEDICARE

## 2023-03-15 NOTE — TELEPHONE ENCOUNTER
Medical Records Request     Name of caller: Yisel     Relationship to patient: self     Who’s requesting copy of medical records? Pt     Records being requested: labs ordered 3/7     Send to home address on file     Best contact number:  [phone]

## 2023-03-27 NOTE — TELEPHONE ENCOUNTER
Medical Records Request    Pt has an appt tomorrow with PCP   Name of caller: Yisel      Relationship to patient: self      Who’s requesting copy of medical records? Pt      Records being requested: lab results from 2/24 and 2/28   Fax  [phone]      Best contact number:  [phone]

## 2023-03-29 ENCOUNTER — HOSPITAL ENCOUNTER (OUTPATIENT)
Dept: RADIOLOGY | Facility: HOSPITAL | Age: 73
Discharge: HOME | End: 2023-03-29
Attending: PHYSICIAN ASSISTANT
Payer: MEDICARE

## 2023-03-29 VITALS — WEIGHT: 130 LBS | BODY MASS INDEX: 24.56 KG/M2

## 2023-03-29 DIAGNOSIS — I71.20 THORACIC AORTIC ANEURYSM WITHOUT RUPTURE (CMS/HCC): ICD-10-CM

## 2023-03-29 PROCEDURE — G1004 CDSM NDSC: HCPCS

## 2023-03-29 PROCEDURE — C8911 MRA W/O FOL W/CONT, CHEST: HCPCS | Mod: ME

## 2023-03-29 PROCEDURE — A9585 GADOBUTROL INJECTION: HCPCS | Performed by: PHYSICIAN ASSISTANT

## 2023-03-29 RX ORDER — GADOBUTROL 604.72 MG/ML
0.1 INJECTION INTRAVENOUS ONCE
Status: COMPLETED | OUTPATIENT
Start: 2023-03-29 | End: 2023-03-29

## 2023-03-29 RX ADMIN — GADOBUTROL 5.9 MMOL: 604.72 INJECTION INTRAVENOUS at 12:05

## 2023-04-17 ASSESSMENT — ENCOUNTER SYMPTOMS
RESPIRATORY NEGATIVE: 1
EYES NEGATIVE: 1
PSYCHIATRIC NEGATIVE: 1
NEUROLOGICAL NEGATIVE: 1
ENDOCRINE NEGATIVE: 1
GASTROINTESTINAL NEGATIVE: 1
MUSCULOSKELETAL NEGATIVE: 1
CONSTITUTIONAL NEGATIVE: 1
HEMATOLOGIC/LYMPHATIC NEGATIVE: 1

## 2023-04-17 NOTE — PROGRESS NOTES
[facility]  Dr. Duglas Haley- System Chief Cardiothoracic Surgery  [facility]  [phone]     Reason for visit: ascending aortic aneurysm    Referring Provider: Cardiologist: Dr. Albert Dale  PCP: Aylin Madden MD   HPI    Yisel Lyle is a 73 y.o. female with PMHx known ascending aortic aneurysm (4cm in 2019 on CT chest), TIA 2019, asthma, GERD, RACHELL on CPAP, osteopenia, vocal cord paralysis following anterior cervical discectomy with fusion who presents today for aneurysm surveillance. Cardiac MRA on 4/7/22 reveals stable ascending aortic dilatation measuring 3.8cm x 3.9 cm. Pt has a contrast dye allergy, requested MRI over CT with prep. Pt denies family history of aneurysms. She is a non smoker. She does not lift anything over 30 pounds since her anterior cervical fusion. She reports blood pressure is well controlled.     She reports she continues to struggle  With her asthma. Had some shortness of breath with activity. She no longer needs to see pulmonary          Past Medical History:   Diagnosis Date   • Abnormal ECG    • Ascending aorta enlargement (CMS/HCC) 7/17/2019    The ascending aorta is mildly enlarged measuring approximately 4 cm diameter.  (6/6/2019)   • Asthma    • Coronary artery calcification seen on CT scan 7/17/2019    Mild coronary artery calcifications on CT of chest (6/6/2019)   • GERD (gastroesophageal reflux disease)    • Obstructive sleep apnea syndrome 1/15/2020   • Opacity of lung on imaging study 7/17/2019    8mm subsolid opacity in the posterior right upper lobe, which may be inflammatory in nature (noted on CT of chest 6/6/2019)   • Osteopenia 7/23/2020   • Shortness of breath 7/17/2019   • Stroke (CMS/HCC)    • Vocal cord paralysis     compliacation of anterior cervical discectomy with fusion     Past Surgical History:   Procedure Laterality Date   • ANTERIOR CERVICAL DISCECTOMY W/ FUSION  10/2018   • CATARACT EXTRACTION W/  INTRAOCULAR  LENS IMPLANT Left 2017   • COLONOSCOPY W/ POLYPECTOMY      colonoscopies every 3 years   • EYE SURGERY Left 2017    fror replacement of wrong lens from cataract surgery 2 weeks prior   • REDUCTION MAMMAPLASTY Bilateral 1988   • SKIN BIOPSY     • TONSILLECTOMY  1976   • VOCAL CORD INJECTION Right 03/2019    for paralysis after acdf     Iodine and iodide containing products, Venom-honey bee, and Anesthesia s/i-40 (propofol) [propofol]    Social History     Socioeconomic History   • Marital status:    Tobacco Use   • Smoking status: Never   • Smokeless tobacco: Never   Vaping Use   • Vaping status: Never Used   Substance and Sexual Activity   • Alcohol use: Yes     Comment: rare   • Drug use: No   • Sexual activity: Not Currently     Partners: Male     Family History   Problem Relation Age of Onset   • Alzheimer's disease Biological Mother    • Colon cancer Biological Father    • BEVERLY disease Biological Sister    • Coronary artery disease Mother's Brother         in all 5 maternal uncles   • No Known Problems Biological Son    • No Known Problems Biological Son         Current Outpatient Medications:   •  albuterol HFA (VENTOLIN HFA) 90 mcg/actuation inhaler, 2 puffs daily and an additional dose as needed for wheezing or shortness of breath.  , Disp: , Rfl:   •  ascorbic acid (VITAMIN C ORAL), Take 250 mg by mouth daily., Disp: , Rfl:   •  aspirin 81 mg enteric coated tablet, Take 81 mg by mouth daily., Disp: , Rfl:   •  cholecalciferol, vitamin D3, 50 mcg (2,000 unit) capsule, Take 2,000 Units by mouth daily. 2 capsules 4000 IU daily , Disp: , Rfl:   •  diphenhydrAMINE (BENADRYL) 50 mg capsule, Take 1 capsule (50 mg total) by mouth See admin instr. 1 hour prior to study (Patient taking differently: Take 50 mg by mouth See admin instr. As needed), Disp: 1 capsule, Rfl: 3  •  famotidine (PEPCID) 20 mg tablet, Take 20 mg by mouth daily., Disp: , Rfl:   •  fexofenadine (ALLEGRA) 180 mg tablet, Take 180 mg by mouth  daily.  , Disp: , Rfl:   •  fluticasone propionate (FLONASE) 50 mcg/actuation nasal spray, Administer 2 sprays into each nostril daily., Disp: , Rfl:   •  rosuvastatin (CRESTOR) 20 mg tablet, Take 1 tablet (20 mg total) by mouth daily., Disp: 90 tablet, Rfl: 3  •  SYMBICORT 160-4.5 mcg/actuation inhaler, , Disp: , Rfl:     Review of Systems   Constitutional: Negative.   HENT: Negative.    Eyes: Negative.    Cardiovascular: Negative for chest pain.   Respiratory: Negative.    Endocrine: Negative.    Hematologic/Lymphatic: Negative.    Skin:        Skin cancer right arm    Musculoskeletal: Negative.    Gastrointestinal: Negative.    Genitourinary: Negative.    Neurological: Negative.    Psychiatric/Behavioral: Negative.       Objective    Vitals:    04/21/23 1057   BP: 118/78   Pulse: 88   Resp: 16   Temp: 36.2 °C (97.2 °F)   SpO2: 100%      Physical Exam  Constitutional:       General: She is not in acute distress.     Appearance: Normal appearance. She is normal weight.   HENT:      Head: Normocephalic and atraumatic.   Eyes:      Extraocular Movements: Extraocular movements intact.      Conjunctiva/sclera: Conjunctivae normal.   Cardiovascular:      Rate and Rhythm: Normal rate and regular rhythm.      Heart sounds: Normal heart sounds.   Pulmonary:      Effort: Pulmonary effort is normal. No respiratory distress.      Breath sounds: Normal breath sounds.   Musculoskeletal:         General: Normal range of motion.      Right lower leg: No edema.      Left lower leg: No edema.   Skin:     General: Skin is warm and dry.   Neurological:      General: No focal deficit present.      Mental Status: She is alert and oriented to person, place, and time.   Psychiatric:         Mood and Affect: Mood normal.         Behavior: Behavior normal.         Thought Content: Thought content normal.           Imaging  Testing summarized below:    Cardiac MRA 3/29/23  IMPRESSION:  The ascending thoracic aorta measures 3.8 x 3.6 cm.  Similar to the previous  study allowing for technical differences.     The proximal descending thoracic aorta measures 2.2 cm.     In accordance with [place] Act [place],  the patient will receive a letter notifying them  to follow up with their physician.    TTE 2/25/21:  · The left ventricle is normal in size. There is normal wall thickness. There is focal basal septal hypertrophy. There is normal left ventricular systolic function. Left ventricular ejection fraction measures 65% by visual estimate. There is normal wall motion. Normal diastolic filling.  · The right ventricle is normal in size. There is normal right ventricular systolic function.  · The left atrium is normal in size.  · The right atrium is normal in size. Unable to adequately assess for ASD/PFO by color Doppler.  · The aortic valve has three cusps. The leaflets are mildly thickened. There is no aortic stenosis. There is no aortic regurgitation.  · The mitral valve is normal. There is trace mitral regurgitation.  · The tricuspid valve is normal. There is trace tricuspid valve regurgitation. The jet is insufficient to estimate right ventricular systolic pressure.  · The pulmonic valve is not well seen.  · The aortic root is normal in size. There is limited visualization of the ascending aorta with a maximum dimension of 4 cm. The aortic arch is not well visualized.         Assessment/Plan    Assessment/Plan     Assessment: Thoracic Aortic Aneurysm  Yisel Lyle is a 73 y.o. female seen today in consultation for ascending aortic aneurysm. Pt hs a contrast dye allergy, requested MRA instead of CT. MRA on independent review shows Ascending aorta measures 3.6 cm on sagittal view and  3.9 cm on coronal view at the level of the pulm artery.  She denies any family history of cardiac disease including aortic aneurysm, valvular pathology, and sudden death.    Aortic Height Index Calculation: 2.26-2.58 The patient is in the low to moderate risk category for risk of  dissection or rupture.   Aortic Size Index (size/body surface area): 2.49 The patient is in the low risk category for risk of dissection or rupture.   Ratio Surface/Height: 7.71  No indications for surgery     Plan:  - return to our AV[facility] Clinic in 1 year for continued surveillance with cardiac MRI.    Additional recommendations   Yisel Lyle was educated on proper aortic precautions to take from this point forward. She was asked to maintain adequate blood pressure control (with systolic <140) and to avoid lifting anything in excess of 40 lbs.  Additionally, the patient was asked to continue to refrain from smoking as this could exacerbate their current condition.  Lastly, in the unlikely event that the patient experience severe tearing/ripping chest or back pain, she was instructed to go to the nearest emergency department immediately and inform the team that they are under Dr. Haley’s care.     All questions were answered and the patient verbalized understanding. Yisel Lyle was encouraged to call us in the interim with any additional questions or concerns.        Thank you for the opportunity participate in this patient's care.  Please call [phone] with any questions or concerns.     TERESA Johnson 4/17/2023  12:03 PM

## 2023-04-21 ENCOUNTER — OFFICE VISIT (OUTPATIENT)
Dept: CARDIOTHORACIC SURGERY | Facility: CLINIC | Age: 73
End: 2023-04-21
Payer: MEDICARE

## 2023-04-21 VITALS
WEIGHT: 130 LBS | TEMPERATURE: 97.2 F | HEART RATE: 88 BPM | OXYGEN SATURATION: 100 % | HEIGHT: 61 IN | RESPIRATION RATE: 16 BRPM | SYSTOLIC BLOOD PRESSURE: 118 MMHG | DIASTOLIC BLOOD PRESSURE: 78 MMHG | BODY MASS INDEX: 24.55 KG/M2

## 2023-04-21 DIAGNOSIS — I71.21 ANEURYSM OF ASCENDING AORTA WITHOUT RUPTURE (CMS/HCC): Primary | ICD-10-CM

## 2023-04-21 PROCEDURE — 99214 OFFICE O/P EST MOD 30 MIN: CPT | Performed by: NURSE PRACTITIONER

## 2023-06-02 ENCOUNTER — HOSPITAL ENCOUNTER (OUTPATIENT)
Dept: CARDIOLOGY | Facility: HOSPITAL | Age: 73
Discharge: HOME | End: 2023-06-02
Attending: INTERNAL MEDICINE
Payer: MEDICARE

## 2023-06-02 VITALS
DIASTOLIC BLOOD PRESSURE: 80 MMHG | SYSTOLIC BLOOD PRESSURE: 122 MMHG | HEIGHT: 61 IN | WEIGHT: 130 LBS | BODY MASS INDEX: 24.55 KG/M2

## 2023-06-02 DIAGNOSIS — R60.9 EDEMA, UNSPECIFIED TYPE: ICD-10-CM

## 2023-06-02 LAB
AORTIC SINUS VALSALVA: 3.3 CM
AORTIC VALVE AREA: 1.62 SQUARE CENTIMETERS PER SQUARE METER
AORTIC VALVE AT: 91.17 MS
AORTIC VALVE MEAN VELOCITY: 0.77 M/S
AORTIC VALVE VELOCITY TIME INTEGRAL: 20.47 CM
ASCENDING AORTA: 3.9 CM
AV MEAN GRADIENT: 2.55 MMHG
AV PEAK GRADIENT: 4.02 MMHG
AV PEAK VELOCITY-S: 1 M/S
AV VALVE AREA INDEX: 1.53
AV VALVE AREA: 2.01 CM2
AV VELOCITY RATIO: 0.73
AVA (VTI): 2.44 CM2
BSA FOR ECHO PROCEDURE: 1.59 M2
DOP CALC LVOT STROKE VOLUME: 49.86 ML
DOP CALC RVOT VTI: 11.67 CM
E WAVE DECELERATION TIME: 312.02 MS
E WAVE DECELERATION TIME: 312.02 MS
E/A RATIO: 0.9
E/E' RATIO: 7.03
E/LAT E' RATIO: 6.17
EDV (BP): 65.75 ML
EF (A4C): 64.75 %
EF A2C: 64.92 %
EJECTION FRACTION: 65.13 %
ESV (BP): 22.93 ML
FRACTIONAL SHORTENING: 41.48 %
HEART RATE: 59 BPM
INTERVENTRICULAR SEPTUM: 0.96 CM
LA ESV (BP): 27.62 ML
LA ESV INDEX (A2C): 21.38 ML/M2
LA ESV INDEX (BP): 17.37 ML/M2
LAAS-AP2: 13.28 CM2
LAAS-AP4: 11.31 CM2
LAD 2D: 2.93 CM
LAV-S: 34 ML
LEFT ATRIUM VOLUME INDEX: 13.65 ML/M2
LEFT ATRIUM VOLUME: 21.7 ML
LEFT INTERNAL DIMENSION IN SYSTOLE: 2.37 CM (ref 2.28–3.44)
LEFT VENTRICLE DIASTOLIC VOLUME INDEX: 45.85 ML/M2
LEFT VENTRICLE DIASTOLIC VOLUME: 72.9 ML
LEFT VENTRICLE MASS INDEX: 77.49 G/M2
LEFT VENTRICLE SYSTOLIC VOLUME INDEX: 16.16 ML/M2
LEFT VENTRICLE SYSTOLIC VOLUME: 25.7 ML
LEFT VENTRICULAR INTERNAL DIMENSION IN DIASTOLE: 4.05 CM (ref 3.83–5.32)
LEFT VENTRICULAR MASS: 123.21 G
LEFT VENTRICULAR POSTERIOR WALL IN END DIASTOLE: 0.97 CM (ref 0.49–0.92)
LV DIASTOLIC VOLUME: 59.3 ML
LV ESV (APICAL 2 CHAMBER): 20.8 ML
LVCI: 1.46 LITERS PER MINUTE PER SQUARE METER
LVCO: 2.29 LITERS PER MINUTE
LVEDVI(A2C): 37.3 ML/M2
LVEDVI(BP): 41.35 ML/M2
LVESVI(A2C): 13.08 ML/M2
LVESVI(BP): 14.42 ML/M2
LVOT 2D: 2.06 CM
LVOT A: 3.34 CM2
LVOT MG: 1.15 MMHG
LVOT MV: 0.5 M/S
LVOT PEAK VELOCITY: 0.77 M/S
LVOT PG: 2.35 MMHG
LVOT STROKE VOLUME INDEX: 31.36 ML/M2
LVOT VTI: 14.97 CM
MLH CV ECHO AVA INDEX VELOCITY RATIO: 1.3
MV E'TISSUE VEL-LAT: 0.1 M/S
MV E'TISSUE VEL-MED: 0.09 M/S
MV PEAK A VEL: 0.69 M/S
MV PEAK E VEL: 0.62 M/S
POSTERIOR WALL: 1 CM
TR MAX PG: 16.81 MMHG
TRICUSPID VALVE PEAK REGURGITATION VELOCITY: 2.05 M/S
Z-SCORE OF LEFT VENTRICULAR DIMENSION IN END DIASTOLE: -1.08
Z-SCORE OF LEFT VENTRICULAR DIMENSION IN END SYSTOLE: -1.32
Z-SCORE OF LEFT VENTRICULAR POSTERIOR WALL IN END DIASTOLE: 1.92

## 2023-06-02 PROCEDURE — 93306 TTE W/DOPPLER COMPLETE: CPT | Mod: 26 | Performed by: INTERNAL MEDICINE

## 2023-06-02 PROCEDURE — 93306 TTE W/DOPPLER COMPLETE: CPT

## 2023-09-06 ENCOUNTER — LAB REQUISITION (OUTPATIENT)
Dept: LAB | Facility: HOSPITAL | Age: 73
End: 2023-09-06
Attending: INTERNAL MEDICINE
Payer: MEDICARE

## 2023-09-06 DIAGNOSIS — E78.00 PURE HYPERCHOLESTEROLEMIA, UNSPECIFIED: ICD-10-CM

## 2023-09-06 LAB
ALBUMIN SERPL-MCNC: 4.6 G/DL (ref 3.5–5.7)
ALP SERPL-CCNC: 51 IU/L (ref 34–125)
ALT SERPL-CCNC: 24 IU/L (ref 7–52)
AST SERPL-CCNC: 23 IU/L (ref 13–39)
BILIRUB DIRECT SERPL-MCNC: 0.2 MG/DL
BILIRUB SERPL-MCNC: 0.9 MG/DL (ref 0.3–1.2)
CHOLEST SERPL-MCNC: 184 MG/DL
HDLC SERPL-MCNC: 92 MG/DL
HDLC SERPL: 2 {RATIO}
LDLC SERPL CALC-MCNC: 78 MG/DL
NONHDLC SERPL-MCNC: 92 MG/DL
PROT SERPL-MCNC: 7.1 G/DL (ref 6–8.2)
TRIGL SERPL-MCNC: 71 MG/DL

## 2023-09-06 PROCEDURE — 36415 COLL VENOUS BLD VENIPUNCTURE: CPT | Performed by: INTERNAL MEDICINE

## 2023-09-06 PROCEDURE — 80076 HEPATIC FUNCTION PANEL: CPT | Performed by: INTERNAL MEDICINE

## 2023-09-06 PROCEDURE — 80061 LIPID PANEL: CPT | Performed by: INTERNAL MEDICINE

## 2023-09-12 ENCOUNTER — OFFICE VISIT (OUTPATIENT)
Dept: CARDIOLOGY | Facility: CLINIC | Age: 73
End: 2023-09-12
Payer: MEDICARE

## 2023-09-12 VITALS
BODY MASS INDEX: 25.49 KG/M2 | HEART RATE: 76 BPM | WEIGHT: 135 LBS | OXYGEN SATURATION: 96 % | SYSTOLIC BLOOD PRESSURE: 114 MMHG | RESPIRATION RATE: 18 BRPM | DIASTOLIC BLOOD PRESSURE: 70 MMHG | HEIGHT: 61 IN

## 2023-09-12 DIAGNOSIS — I77.89 ASCENDING AORTA ENLARGEMENT (CMS/HCC): ICD-10-CM

## 2023-09-12 DIAGNOSIS — G45.9 TIA (TRANSIENT ISCHEMIC ATTACK): ICD-10-CM

## 2023-09-12 DIAGNOSIS — R06.02 SHORTNESS OF BREATH: ICD-10-CM

## 2023-09-12 DIAGNOSIS — E78.00 HYPERCHOLESTEROLEMIA: ICD-10-CM

## 2023-09-12 DIAGNOSIS — I25.10 CORONARY ARTERY CALCIFICATION SEEN ON CT SCAN: Primary | ICD-10-CM

## 2023-09-12 PROCEDURE — 99214 OFFICE O/P EST MOD 30 MIN: CPT | Performed by: INTERNAL MEDICINE

## 2023-09-12 PROCEDURE — 93000 ELECTROCARDIOGRAM COMPLETE: CPT | Performed by: INTERNAL MEDICINE

## 2023-09-12 RX ORDER — CLINDAMYCIN PHOSPHATE 11.9 MG/ML
SOLUTION TOPICAL AS NEEDED
COMMUNITY
Start: 2023-07-27

## 2023-09-12 NOTE — PROGRESS NOTES
Albert Dale MD  Cardiology    [facility]    [facility]  [facility] [address]  [address] [place] [address]    TEL  [phone]  [facility]     09/12/23    Dear Dr. Madden:    It was my pleasure to see Yisel Lyle at [facility] today for follow-up.    As you know, she is a 73 y.o. female with a history of cervical arthropathy status post anterior cervical discectomy and fusion complicated by vocal cord paralysis, laryngeal spasm and dyspnea, obstructive sleep apnea on CPAP therapy, TIA in November 2019, coronary calcifications, and a mildly enlarged ascending aorta.    Following her last visit she endorsed lower extremity pain intermittently.  It was my recommendation that she stop rosuvastatin for a few weeks to see if her symptoms improve.  She ultimately continue the medication and her leg pain has largely resolved.  She reports no new cardiopulmonary concerns or complaints. No chest pain or pressure.  Dyspnea remains intermittent, not progressing.  No significant lower extremity edema.  No orthopnea or PND.  No palpitations or syncope.  She has been walking 41193 steps per day.  Her diet is stable.  Compliant with her medications.  She continues to struggle with balance issues.  She continues to follow with the aortic center. In April they recommended a repeat MRI in 1 year.    Past Medical History:  Past Medical History:   Diagnosis Date    Abnormal ECG     Ascending aorta enlargement (CMS/HCC) 7/17/2019    The ascending aorta is mildly enlarged measuring approximately 4 cm diameter.  (6/6/2019)    Asthma     Coronary artery calcification seen on CT scan 7/17/2019    Mild coronary artery calcifications on CT of chest (6/6/2019)    GERD (gastroesophageal reflux disease)     Obstructive sleep apnea syndrome 1/15/2020    Opacity of lung on imaging study 7/17/2019    8mm subsolid opacity in the posterior right upper  lobe, which may be inflammatory in nature (noted on CT of chest 6/6/2019)    Osteopenia 7/23/2020    Shortness of breath 7/17/2019    Stroke (CMS/HCC)     Vocal cord paralysis     compliacation of anterior cervical discectomy with fusion       Past Surgical History:  Past Surgical History:   Procedure Laterality Date    ANTERIOR CERVICAL DISCECTOMY W/ FUSION  10/2018    CATARACT EXTRACTION W/  INTRAOCULAR LENS IMPLANT Left 2017    COLONOSCOPY W/ POLYPECTOMY      colonoscopies every 3 years    EYE SURGERY Left 2017    fror replacement of wrong lens from cataract surgery 2 weeks prior    REDUCTION MAMMAPLASTY Bilateral 1988    SKIN BIOPSY      TONSILLECTOMY  1976    VOCAL CORD INJECTION Right 03/2019    for paralysis after acdf       Medications:  Current Outpatient Medications   Medication Sig Dispense Refill    albuterol HFA (VENTOLIN HFA) 90 mcg/actuation inhaler Inhale 2 puffs daily.      ascorbic acid (VITAMIN C ORAL) Take 250 mg by mouth daily.      aspirin 81 mg enteric coated tablet Take 81 mg by mouth daily.      cholecalciferol, vitamin D3, 50 mcg (2,000 unit) capsule Take 2,000 Units by mouth daily. 2 capsules 4000 IU daily       clindamycin (CLEOCIN T) 1 % external solution Apply topically as needed.      famotidine (PEPCID) 20 mg tablet Take 20 mg by mouth daily.      fexofenadine (ALLEGRA) 180 mg tablet Take 180 mg by mouth daily.        fluticasone propionate (FLONASE) 50 mcg/actuation nasal spray Administer 2 sprays into each nostril daily.      NOT IN DATABASE Pt use an additional inhale as needed. Name unknown to pt.      rosuvastatin (CRESTOR) 20 mg tablet Take 1 tablet (20 mg total) by mouth daily. 90 tablet 3    diphenhydrAMINE (BENADRYL) 50 mg capsule Take 1 capsule (50 mg total) by mouth See admin instr. 1 hour prior to study (Patient not taking: Reported on 9/12/2023) 1 capsule 3    SYMBICORT 160-4.5 mcg/actuation inhaler        No current facility-administered  medications for this visit.       Allergies: Iodine and iodide containing products, Venom-honey bee, and Anesthesia s/i-40 (propofol) [propofol]    Social History: She is a retired .  She has 2 children.  She is a never smoker.  Rare alcohol use.  No recreational drugs.    Family History: All 5 maternal uncles had coronary disease.  No family history of premature coronary artery disease, arrhythmias, cardiomyopathies, or sudden cardiac death.    Review of Systems: A complete 14-point review of systems is negative, except as noted in the HPI.    Exam:  Objective   Vitals:    09/12/23 1252   BP: 114/70   Pulse: 76   Resp: 18   SpO2: 96%     Body mass index is 25.93 kg/m².  Constitutional: Appears comfortable.   Eyes: No icterus.   ENT: Deferred.  Neck: No jugular venous distention.   Vascular: No carotid bruits.   Cardiac: Normal S1 and S2, regular rhythm.  1/6 murmur at the base.  No rubs or gallops appreciated.  Lungs: Clear to auscultation bilaterally.   GI: Soft, normoactive bowel sounds.  Extremities: Warm. No lower extremity edema.   Skin: Dry.   Neurologic: Awake, alert, oriented.    Psychiatric: No agitation.    Wt Readings from Last 3 Encounters:   09/12/23 61.2 kg (135 lb)   06/02/23 59 kg (130 lb)   04/21/23 59 kg (130 lb)     Labs: Personally reviewed and discussed with the patient.  Notable for the following.   Lab Results   Component Value Date    LDLCALC 78 09/06/2023    CHOL 184 09/06/2023    TRIG 71 09/06/2023    HDL 92 09/06/2023     02/24/2023    K 4.5 02/28/2023    BUN 16 02/24/2023    CREATININE 0.8 02/24/2023    WBC 5.77 06/18/2019    HGB 14.0 06/18/2019     06/18/2019     Lab Results   Component Value Date    ALT 24 09/06/2023    AST 23 09/06/2023    ALKPHOS 51 09/06/2023    BILITOT 0.9 09/06/2023       Cardiovascular Studies:   1.  Stress echocardiogram, 10/2018: Negative for ischemia.  Fair-good exercise tolerance. Rest echo-normal LV wall thickness, LVEF 65%,  normal RV systolic function, no significant valvular disease noted.  Ascending aorta 3.8 cm.  No mention of the aortic valve.  2.  CT chest, 6/2019: Ascending aorta 4 cm  3.  TTE, 2/21: Normal LV size, focal basal septal hypertrophy, LVEF 65%, no WMAs. Normal RV size/function. Tricuspid aortic valve. Ascending aorta 4 cm.   4.  Chest MRI (no contrast), 4/2022: Ascending aorta 39 mm.  5.  Chest MRA, 3/2023: Ascending aorta 38 mm.  6.  TTE, 6/2023: Normal LV size, focal basal septal hypertrophy, LVEF 65%. No WMAs. Normal RV size/function. Tricuspid AV. No significant valve disease. Ascending aorta 39mm. No pericardial effusion.     ECG from today personally reviewed and discussed with the patient shows sinus rhythm with nonspecific T wave abnormality.      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  Dr. Khalil previously suspected additional nerve damage may have developed resulting in dysfunction of her upper airway musculature and producing her exertional dyspnea.  She has no signs of heart failure.  We will continue to monitor clinically.  She knows to contact me should her symptoms progress in any way.     Coronary calcification incidentally noted on CT scan 6/19:  She had a negative stress test for ischemia in October 2018.  No new anginal symptoms.  She will remain on aspirin and lipid-lowering therapy.     Hypercholesterolemia:  Her LDL remains above goal given her history of TIA.  I recommended alterations to her lipid-lowering therapy.  She has a strong preference to avoid any additional medication/up titration of medication dose at this time.  She will renew efforts at lifestyle change and we will repeat a lipid panel before her follow-up visit.  We can discuss medication titration at that time, as needed.  She will remain on rosuvastatin 20 mg daily.  She  will contact me if she develops any recurrent leg discomfort.    Mildly dilated ascending aorta:  She continues to follow with the aortic center. Her echocardiogram showed a tricuspid aortic valve.  Blood pressure well controlled.     History of TIA/small vessel ischemia:  Blood pressure in target range.  LDL management as above.  I previously recommended that she establish care with a neurologist.      It was my pleasure to visit with Yisel in clinic today.  She will follow up with me in 6 months.  Please do not hesitate to contact me with any questions.      Sincerely,         ________________  Albert Dale MD

## 2023-11-13 ENCOUNTER — TELEPHONE (OUTPATIENT)
Dept: SCHEDULING | Facility: CLINIC | Age: 73
End: 2023-11-13
Payer: MEDICARE

## 2023-11-13 NOTE — TELEPHONE ENCOUNTER
Pt was seen [facility] for [facility]. Pt was advised to see cardiologist.     Pt is scheduled for tomorrow and would like to know if she can be seen 11/15/23 due to having other appts at [facility]. Pt would like to know if she can be seen in the afternoon.     Pt can be reached at [phone].

## 2023-11-14 ENCOUNTER — OFFICE VISIT (OUTPATIENT)
Dept: CARDIOLOGY | Facility: CLINIC | Age: 73
End: 2023-11-14
Payer: MEDICARE

## 2023-11-14 VITALS
DIASTOLIC BLOOD PRESSURE: 68 MMHG | HEIGHT: 61 IN | HEART RATE: 63 BPM | BODY MASS INDEX: 25.51 KG/M2 | SYSTOLIC BLOOD PRESSURE: 116 MMHG | OXYGEN SATURATION: 99 %

## 2023-11-14 DIAGNOSIS — I77.89 ASCENDING AORTA ENLARGEMENT (CMS/HCC): ICD-10-CM

## 2023-11-14 DIAGNOSIS — R06.02 SHORTNESS OF BREATH: ICD-10-CM

## 2023-11-14 DIAGNOSIS — E78.00 HYPERCHOLESTEROLEMIA: ICD-10-CM

## 2023-11-14 DIAGNOSIS — R94.31 ABNORMAL EKG: Primary | ICD-10-CM

## 2023-11-14 DIAGNOSIS — G45.9 TIA (TRANSIENT ISCHEMIC ATTACK): ICD-10-CM

## 2023-11-14 DIAGNOSIS — I25.10 CORONARY ARTERY CALCIFICATION SEEN ON CT SCAN: ICD-10-CM

## 2023-11-14 DIAGNOSIS — J38.00 VOCAL CORD PARALYSIS: ICD-10-CM

## 2023-11-14 PROCEDURE — 99214 OFFICE O/P EST MOD 30 MIN: CPT | Performed by: INTERNAL MEDICINE

## 2023-11-14 PROCEDURE — 93000 ELECTROCARDIOGRAM COMPLETE: CPT | Performed by: INTERNAL MEDICINE

## 2023-11-14 RX ORDER — METHENAMINE HIPPURATE 1000 MG/1
1 TABLET ORAL DAILY
COMMUNITY
Start: 2023-10-26

## 2023-11-14 RX ORDER — ESTRADIOL 0.1 MG/G
CREAM VAGINAL AS NEEDED
COMMUNITY
Start: 2023-09-11

## 2023-11-14 NOTE — PROGRESS NOTES
Albert Dale MD  Cardiology    [facility]    [facility]  [facility] [address]  [address] [place] [address]    TEL  [phone]  [facility]     11/14/23    Dear Dr. Madden:    It was my pleasure to see Yisel Lyle at the [facility] today for follow-up.    As you know, she is a 73 y.o. female with a history of cervical arthropathy status post anterior cervical discectomy and fusion complicated by vocal cord paralysis, laryngeal spasm and dyspnea, obstructive sleep apnea on CPAP therapy, TIA in November 2019, coronary calcifications, and a mildly enlarged ascending aorta.    Yisel presents today at the recommendation of an urgent care provider whom she saw for bronchitis.  She has been struggling with bronchitis over the last few weeks.  She has had an intermittent fever and productive cough.  Fortunately the symptoms are starting to improve with appropriate therapy.  She had an ECG obtained during one of her urgent care visits.  The computer read noted an inferior infarct pattern and it was recommended to her that she follow-up with cardiology.  Other than her pulmonary symptoms, which are improving, she has felt well overall.  She has had occasional chest discomfort with coughing.  The symptoms are also reproducible on palpation.  No other new symptoms.  No lower extremity edema, palpitations, or syncope.  She is compliant with medications and reports no side effects.    Past Medical History:  Past Medical History:   Diagnosis Date    Abnormal ECG     Ascending aorta enlargement (CMS/HCC) 7/17/2019    The ascending aorta is mildly enlarged measuring approximately 4 cm diameter.  (6/6/2019)    Asthma     Coronary artery calcification seen on CT scan 7/17/2019    Mild coronary artery calcifications on CT of chest (6/6/2019)    GERD (gastroesophageal reflux disease)     Obstructive sleep apnea syndrome 1/15/2020    Opacity of  lung on imaging study 7/17/2019    8mm subsolid opacity in the posterior right upper lobe, which may be inflammatory in nature (noted on CT of chest 6/6/2019)    Osteopenia 7/23/2020    Shortness of breath 7/17/2019    Stroke (CMS/HCC)     Vocal cord paralysis     compliacation of anterior cervical discectomy with fusion       Past Surgical History:  Past Surgical History:   Procedure Laterality Date    ANTERIOR CERVICAL DISCECTOMY W/ FUSION  10/2018    CATARACT EXTRACTION W/  INTRAOCULAR LENS IMPLANT Left 2017    COLONOSCOPY W/ POLYPECTOMY      colonoscopies every 3 years    EYE SURGERY Left 2017    fror replacement of wrong lens from cataract surgery 2 weeks prior    REDUCTION MAMMAPLASTY Bilateral 1988    SKIN BIOPSY      TONSILLECTOMY  1976    VOCAL CORD INJECTION Right 03/2019    for paralysis after acdf       Medications:  Current Outpatient Medications   Medication Sig Dispense Refill    albuterol HFA (VENTOLIN HFA) 90 mcg/actuation inhaler Inhale 2 puffs daily.      ascorbic acid (VITAMIN C ORAL) Take 250 mg by mouth daily.      aspirin 81 mg enteric coated tablet Take 81 mg by mouth daily.      cholecalciferol, vitamin D3, 50 mcg (2,000 unit) capsule Take 2,000 Units by mouth daily. 2 capsules 4000 IU daily       clindamycin (CLEOCIN T) 1 % external solution Apply topically as needed.      diphenhydrAMINE (BENADRYL) 50 mg capsule Take 1 capsule (50 mg total) by mouth See admin instr. 1 hour prior to study 1 capsule 3    estradioL (ESTRACE) 0.01 % (0.1 mg/gram) vaginal cream APPLY 1 GRAM INTO THE VAGINA TWICE WEEKLY      famotidine (PEPCID) 20 mg tablet Take 20 mg by mouth daily.      fexofenadine (ALLEGRA) 180 mg tablet Take 180 mg by mouth daily.        fluticasone propionate (FLONASE) 50 mcg/actuation nasal spray Administer 2 sprays into each nostril daily.      NOT IN DATABASE Pt use an additional inhale as needed. Name unknown to pt.      rosuvastatin (CRESTOR) 20 mg tablet Take  1 tablet (20 mg total) by mouth daily. 90 tablet 3    SYMBICORT 160-4.5 mcg/actuation inhaler       methenamine (HIPREX) 1 gram tablet TAKE ONE TABLET BY MOUTH TWO TIMES A DAY  FOR 90 DAYS --START   AFTER COMPLETING MACROBID       No current facility-administered medications for this visit.       Allergies: Iodine and iodide containing products, Venom-honey bee, and Anesthesia s/i-40 (propofol) [propofol]    Social History: She is a retired .  She has 2 children.  She is a never smoker.  Rare alcohol use.  No recreational drugs.    Family History: All 5 maternal uncles had coronary disease.  No family history of premature coronary artery disease, arrhythmias, cardiomyopathies, or sudden cardiac death.    Review of Systems: A complete 14-point review of systems is negative, except as noted in the HPI.    Exam:  Objective   Vitals:    11/14/23 1126   BP: 116/68   Pulse: 63   SpO2: 99%     Body mass index is 25.51 kg/m².  Constitutional: Appears comfortable.  Speaking in full sentences.  No distress.  Eyes: No icterus.   ENT: Deferred.  Neck: No jugular venous distention.   Vascular: No carotid bruits.   Cardiac: Normal S1 and S2, regular rhythm.  1/6 murmur at the base.  No rubs or gallops appreciated.  Lungs: Clear to auscultation bilaterally.   GI: Soft, normoactive bowel sounds.  Extremities: Warm. No lower extremity edema.   Skin: Dry.   Neurologic: Awake, alert, oriented.    Psychiatric: No agitation.    Wt Readings from Last 3 Encounters:   09/12/23 61.2 kg (135 lb)   06/02/23 59 kg (130 lb)   04/21/23 59 kg (130 lb)     Labs: Personally reviewed and discussed with the patient.  Notable for the following.   Lab Results   Component Value Date    LDLCALC 78 09/06/2023    CHOL 184 09/06/2023    TRIG 71 09/06/2023    HDL 92 09/06/2023     02/24/2023    K 4.5 02/28/2023    BUN 16 02/24/2023    CREATININE 0.8 02/24/2023    WBC 5.77 06/18/2019    HGB 14.0 06/18/2019     06/18/2019      Lab Results   Component Value Date    ALT 24 09/06/2023    AST 23 09/06/2023    ALKPHOS 51 09/06/2023    BILITOT 0.9 09/06/2023       Cardiovascular Studies:   1.  Stress echocardiogram, 10/2018: Negative for ischemia.  Fair-good exercise tolerance. Rest echo-normal LV wall thickness, LVEF 65%, normal RV systolic function, no significant valvular disease noted.  Ascending aorta 3.8 cm.  No mention of the aortic valve.  2.  CT chest, 6/2019: Ascending aorta 4 cm  3.  TTE, 2/21: Normal LV size, focal basal septal hypertrophy, LVEF 65%, no WMAs. Normal RV size/function. Tricuspid aortic valve. Ascending aorta 4 cm.   4.  Chest MRI (no contrast), 4/2022: Ascending aorta 39 mm.  5.  Chest MRA, 3/2023: Ascending aorta 38 mm.  6.  TTE, 6/2023: Normal LV size, focal basal septal hypertrophy, LVEF 65%. No WMAs. Normal RV size/function. Tricuspid AV. No significant valve disease. Ascending aorta 39mm. No pericardial effusion.     ECG from today personally reviewed and discussed with the patient shows sinus bradycardia with low voltage.  Compared with tracing from 9/12/2023 T wave inversions are little longer seen.    Assessment and Plan:   ECG abnormality:  On my review of her ECG from urgent care, the tracing does not meet criteria for an inferior infarct.  Comparing that tracing with her prior tracings in our office, there is no significant change.  Importantly, she has no new cardiac symptoms.  I suspect that her chest symptoms are likely related to her bronchitis given that they are associated with cough as well as palpation of the chest wall.  I did offer a stress test for further assessment, though she declined today.  She will contact me if her symptoms fail to resolve or she has any new symptoms.    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  Dr. Khalil previously suspected additional  nerve damage may have developed resulting in dysfunction of her upper airway musculature and producing her exertional dyspnea.  She has no signs of heart failure.  We will continue to monitor clinically.  She knows to contact me should her symptoms progress in any way.     Coronary calcification incidentally noted on CT scan 6/19:  She had a negative stress test for ischemia in October 2018.  She will remain on aspirin and lipid-lowering therapy.     Hypercholesterolemia:  Her LDL remains above goal given her history of TIA.  I recommended alterations to her lipid-lowering therapy.  She has a strong preference to avoid any additional medication/up titration of medication dose at this time.  She will renew efforts at lifestyle change and we will repeat a lipid panel before her follow-up visit.  We can discuss medication titration at that time, as needed.  She will remain on rosuvastatin 20 mg daily.  She will contact me if she develops any recurrent leg discomfort.    Mildly dilated ascending aorta:  She continues to follow with the aortic center. Her echocardiogram showed a tricuspid aortic valve.  Blood pressure well controlled.     History of TIA/small vessel ischemia:  Blood pressure in target range.  LDL management as above.  I previously recommended that she establish care with a neurologist.      It was my pleasure to visit with Yisel in clinic today.  She will follow up with me in 6 months.  Please do not hesitate to contact me with any questions.      Sincerely,         ________________  Albert Dale MD

## 2023-11-15 ENCOUNTER — OFFICE VISIT (OUTPATIENT)
Dept: NEUROLOGY | Facility: CLINIC | Age: 73
End: 2023-11-15
Payer: MEDICARE

## 2023-11-15 VITALS — OXYGEN SATURATION: 98 % | DIASTOLIC BLOOD PRESSURE: 70 MMHG | SYSTOLIC BLOOD PRESSURE: 115 MMHG | HEART RATE: 74 BPM

## 2023-11-15 DIAGNOSIS — R41.3 MEMORY LOSS: ICD-10-CM

## 2023-11-15 DIAGNOSIS — R25.1 TREMOR: Primary | ICD-10-CM

## 2023-11-15 DIAGNOSIS — R27.0 ATAXIA: ICD-10-CM

## 2023-11-15 DIAGNOSIS — R53.1 WEAKNESS: ICD-10-CM

## 2023-11-15 PROCEDURE — 99205 OFFICE O/P NEW HI 60 MIN: CPT | Performed by: PSYCHIATRY & NEUROLOGY

## 2023-11-15 ASSESSMENT — PAIN SCALES - GENERAL: PAINLEVEL: 0-NO PAIN

## 2023-11-15 NOTE — PROGRESS NOTES
Yisel Lyle is a 73 y.o. female  11/15/2023  Aylin Madden MD    Neurology Consult Note    Subjective     Yisel Lyle is a 73 y.o. female who is being evaluated for multiple symptoms.  I previously saw the patient over 3 years ago.  At that time we were following a benign tremor, she had a possible TIA and was on best medical management including using a CPAP.    Since her last visit, the patient reported that fortunately she has had no new episodes of transient or static neurologic dysfunction to suggest an aura, seizure, TIA or stroke.  She reported that her memory is severely impaired and that she does not remember information from 1 minute to another.  She reported that she has had COVID twice.  She has word finding difficulties.  Despite her concerns however she lives independently and performs all of her activities of daily living without difficulty.  She does use a CPAP however reported that she sleeps poorly getting at a maximum 4 hours of sleep per night.  She eats well and in general her mood fluctuates.    The patient continues to have a benign essential tremor affecting the left greater than right hand.  It is present mostly with posture and action and absent at rest.  In addition, the patient reported that she feels off balance although she was uncertain why.  She was unable to verbalize her symptoms however on review of systems did perhaps report some degree of lightheadedness and dizziness.  She has a paralyzed vocal cord which sometimes makes it difficult to swallow.  She reported intermittent weakness involving her left arm and leg.  She has been unable to identify a trigger for her symptoms or factors that make her symptoms better or worse.    Detailed neurologic and medical review of systems was unremarkable.  There were no symptoms to suggest increased intracranial pressure, meningitis or systemic illness.    Review of Systems  Constitutional: negative  Eyes: negative  Ears, nose,  mouth, throat, and face: negative  Respiratory: negative  Cardiovascular: negative  Gastrointestinal: negative  Genitourinary:negative  Integument/breast: negative  Hematologic/lymphatic: negative  Musculoskeletal:negative  Neurological: negative  Behavioral/Psych: negative  Endocrine: negative  Allergic/Immunologic: negative    Allergy:  Allergies   Allergen Reactions    Iodine And Iodide Containing Products Anaphylaxis     Contrast dye - welts, and throat closed    Venom-Honey Bee Angioedema    Anesthesia S/I-40 (Propofol) [Propofol]      Bottom half of body went numb       Current Outpatient Medications   Medication Sig Dispense Refill    albuterol HFA (VENTOLIN HFA) 90 mcg/actuation inhaler Inhale 2 puffs daily.      ascorbic acid (VITAMIN C ORAL) Take 250 mg by mouth daily.      aspirin 81 mg enteric coated tablet Take 81 mg by mouth daily.      cholecalciferol, vitamin D3, 50 mcg (2,000 unit) capsule Take 2,000 Units by mouth daily. 2 capsules 4000 IU daily       clindamycin (CLEOCIN T) 1 % external solution Apply topically as needed.      diphenhydrAMINE (BENADRYL) 50 mg capsule Take 1 capsule (50 mg total) by mouth See admin instr. 1 hour prior to study 1 capsule 3    estradioL (ESTRACE) 0.01 % (0.1 mg/gram) vaginal cream APPLY 1 GRAM INTO THE VAGINA TWICE WEEKLY      famotidine (PEPCID) 20 mg tablet Take 20 mg by mouth daily.      fexofenadine (ALLEGRA) 180 mg tablet Take 180 mg by mouth daily.        fluticasone propionate (FLONASE) 50 mcg/actuation nasal spray Administer 2 sprays into each nostril daily.      methenamine (HIPREX) 1 gram tablet TAKE ONE TABLET BY MOUTH TWO TIMES A DAY  FOR 90 DAYS --START   AFTER COMPLETING MACROBID      NOT IN DATABASE Pt use an additional inhale as needed. Name unknown to pt.      rosuvastatin (CRESTOR) 20 mg tablet Take 1 tablet (20 mg total) by mouth daily. 90 tablet 3    SYMBICORT 160-4.5 mcg/actuation inhaler        No current facility-administered  medications for this visit.       Past Medical History:  Past Medical History:   Diagnosis Date    Abnormal ECG     Ascending aorta enlargement (CMS/HCC) 7/17/2019    The ascending aorta is mildly enlarged measuring approximately 4 cm diameter.  (6/6/2019)    Asthma     Coronary artery calcification seen on CT scan 7/17/2019    Mild coronary artery calcifications on CT of chest (6/6/2019)    GERD (gastroesophageal reflux disease)     Obstructive sleep apnea syndrome 1/15/2020    Opacity of lung on imaging study 7/17/2019    8mm subsolid opacity in the posterior right upper lobe, which may be inflammatory in nature (noted on CT of chest 6/6/2019)    Osteopenia 7/23/2020    Shortness of breath 7/17/2019    Stroke (CMS/HCC)     Vocal cord paralysis     compliacation of anterior cervical discectomy with fusion       Past Surgical History:  Past Surgical History:   Procedure Laterality Date    ANTERIOR CERVICAL DISCECTOMY W/ FUSION  10/2018    CATARACT EXTRACTION W/  INTRAOCULAR LENS IMPLANT Left 2017    COLONOSCOPY W/ POLYPECTOMY      colonoscopies every 3 years    EYE SURGERY Left 2017    fror replacement of wrong lens from cataract surgery 2 weeks prior    REDUCTION MAMMAPLASTY Bilateral 1988    SKIN BIOPSY      TONSILLECTOMY  1976    VOCAL CORD INJECTION Right 03/2019    for paralysis after acdf       Social History:  Social History     Socioeconomic History    Marital status:      Spouse name: None    Number of children: None    Years of education: None    Highest education level: None   Tobacco Use    Smoking status: Never    Smokeless tobacco: Never   Vaping Use    Vaping Use: Never used   Substance and Sexual Activity    Alcohol use: Yes     Comment: rare    Drug use: No    Sexual activity: Not Currently     Partners: Male       Family History:  Family History   Problem Relation Age of Onset    Alzheimer's disease Biological Mother     Colon cancer Biological Father     BEVERLY  disease Biological Sister     Coronary artery disease Mother's Brother         in all 5 maternal uncles    No Known Problems Biological Son     No Known Problems Biological Son        Objective     Physical Exam  Visit Vitals  /70 (BP Location: Left upper arm, Patient Position: Sitting)   Pulse 74   SpO2 98%       General Appearance:  Alert, no distress, appears stated age  There was no parkinsonism   Head:  Normocephalic, without obvious abnormality, atraumatic   Eyes:  PERRL, conjunctiva/corneas clear, EOM's intact   Throat:  The tongue and uvula were midline   Neck: Supple, symmetrical, trachea midline, no carotid bruit or JVD   Lungs:  Clear to auscultation bilaterally, respirations unlabored   Chest Wall: No tenderness or deformity   Heart Regular rate and rhythm, S1 and S2 normal, no murmur, rub or gallop   Extremities: Extremities normal, atraumatic, no cyanosis or edema    Musculoskeletal: No injury or deformity   Pulses: 2+ and symmetric all extremities   Skin: Skin color, texture, turgor normal, no rashes or lesions   Behavior/Emotional: Appropriate, cooperative   Neurologic Exam:  Alert and oriented. Attention, concentration, memory, language, visual spatial orientation, executive function is normal.    Pupils equal round and reactive to light. Extraocular movement full with normal pursuit + saccades. No nystagmus noted.   Facial strength and sensation is normal. Hearing normal.  The tongue and uvula were midline. No dysarthria or dysphagia.   Strength was 5/5 in bulbar, axial + extremity muscles; there was prominent giveaway weakness.There was normal bulk and tone with no abnormal movements.   The sensory examination was normal to touch, temperature and pain, vibration and proprioception. There was no dysmetria or cerebellar signs.   The gait was narrow based. Patient was able to tandem walk. Negative Romberg sign. Reflexes were ++ and symmetric. Guy sign was negative. Plantar responses were  flexor.         Problem List Items Addressed This Visit        Other    Tremor - Primary    Current Assessment & Plan     The patient has a benign essential tremor.  I reassured her there is no evidence of parkinsonism or Parkinson's disease.  For now we will take a wait-and-see approach.  Her tremor at most is a mild nuisance which does not negatively impact her activities of daily living or quality of life in any meaningful way.         Relevant Orders    Comprehensive metabolic panel    Folate    TSH    Vitamin B12    Vitamin E    Lyme EIA reflex WB    Sedimentation rate, automated    RPR    MRI BRAIN WITHOUT CONTRAST    Ataxia    Current Assessment & Plan     The patient reported imbalance without any true neurologic symptoms.  We will check an MRI and blood work.  In the future she will benefit from physical therapy for notches strength and conditioning but special attention to her gait and balance.  We want to do everything we can to prevent a fall.         Relevant Orders    Comprehensive metabolic panel    Folate    TSH    Vitamin B12    Vitamin E    Lyme EIA reflex WB    Sedimentation rate, automated    RPR    MRI BRAIN WITHOUT CONTRAST    Ambulatory referral to Physical Therapy    Memory loss    Current Assessment & Plan     The patient reported cognitive impairment.  I reassured her that she does not have dementia.  She lives independently and performs all of her activities of daily living without difficulty.  Her symptoms are likely related to her sleep.  She should follow with her sleep specialist.  We will check an MRI and blood work.  Additional diagnostics and treatments will depend on her clinical course and the results of her pending studies.         Relevant Orders    Comprehensive metabolic panel    Folate    TSH    Vitamin B12    Vitamin E    Lyme EIA reflex WB    Sedimentation rate, automated    RPR    MRI BRAIN WITHOUT CONTRAST    Weakness    Current Assessment & Plan     The patient reported  intermittent weakness involving her left arm and leg.  I am uncertain as to the cause.  Her examination was nonfocal and there was evidence of nonphysiologic giveaway weakness.  We will check blood work and an MRI.         Relevant Orders    Ambulatory referral to Physical Therapy         It was a real pleasure meeting Yisel Lyle today, thank you for allowing me to participate in the medical care. If you have any questions, please call me at any time. Yisel Lyle will follow up with me in the coming weeks to months and keep me updated by telephone. Yisel Lyle knows to notify me immediately if there is any change in the condition or if there are any new symptoms of transient or static neurologic dysfunction.    Renato Diaz MD

## 2023-11-15 NOTE — LETTER
November 15, 2023     Aylin Madden MD  [address]  [address]  [place] [address]    Patient: Yisel Lyle  YOB: 1950  Date of Visit: 11/15/2023      Dear Dr. Madden:    Thank you for referring Yisel Lyle to me for evaluation. Below are my notes for this consultation.    If you have questions, please do not hesitate to call me. I look forward to following your patient along with you.         Sincerely,        Renato Diaz MD        CC: No Recipients    Renato Diaz MD  11/15/2023 11:59 AM  Signed  Yisel Lyle is a 73 y.o. female  11/15/2023  Aylin Madden MD    Neurology Consult Note    Subjective     Yisel Lyle is a 73 y.o. female who is being evaluated for multiple symptoms.  I previously saw the patient over 3 years ago.  At that time we were following a benign tremor, she had a possible TIA and was on best medical management including using a CPAP.    Since her last visit, the patient reported that fortunately she has had no new episodes of transient or static neurologic dysfunction to suggest an aura, seizure, TIA or stroke.  She reported that her memory is severely impaired and that she does not remember information from 1 minute to another.  She reported that she has had COVID twice.  She has word finding difficulties.  Despite her concerns however she lives independently and performs all of her activities of daily living without difficulty.  She does use a CPAP however reported that she sleeps poorly getting at a maximum 4 hours of sleep per night.  She eats well and in general her mood fluctuates.    The patient continues to have a benign essential tremor affecting the left greater than right hand.  It is present mostly with posture and action and absent at rest.  In addition, the patient reported that she feels off balance although she was uncertain why.  She was unable to verbalize her symptoms however on review of systems did perhaps report some  degree of lightheadedness and dizziness.  She has a paralyzed vocal cord which sometimes makes it difficult to swallow.  She reported intermittent weakness involving her left arm and leg.  She has been unable to identify a trigger for her symptoms or factors that make her symptoms better or worse.    Detailed neurologic and medical review of systems was unremarkable.  There were no symptoms to suggest increased intracranial pressure, meningitis or systemic illness.    Review of Systems  Constitutional: negative  Eyes: negative  Ears, nose, mouth, throat, and face: negative  Respiratory: negative  Cardiovascular: negative  Gastrointestinal: negative  Genitourinary:negative  Integument/breast: negative  Hematologic/lymphatic: negative  Musculoskeletal:negative  Neurological: negative  Behavioral/Psych: negative  Endocrine: negative  Allergic/Immunologic: negative    Allergy:  Allergies   Allergen Reactions    Iodine And Iodide Containing Products Anaphylaxis     Contrast dye - welts, and throat closed    Venom-Honey Bee Angioedema    Anesthesia S/I-40 (Propofol) [Propofol]      Bottom half of body went numb       Current Outpatient Medications   Medication Sig Dispense Refill    albuterol HFA (VENTOLIN HFA) 90 mcg/actuation inhaler Inhale 2 puffs daily.      ascorbic acid (VITAMIN C ORAL) Take 250 mg by mouth daily.      aspirin 81 mg enteric coated tablet Take 81 mg by mouth daily.      cholecalciferol, vitamin D3, 50 mcg (2,000 unit) capsule Take 2,000 Units by mouth daily. 2 capsules 4000 IU daily       clindamycin (CLEOCIN T) 1 % external solution Apply topically as needed.      diphenhydrAMINE (BENADRYL) 50 mg capsule Take 1 capsule (50 mg total) by mouth See admin instr. 1 hour prior to study 1 capsule 3    estradioL (ESTRACE) 0.01 % (0.1 mg/gram) vaginal cream APPLY 1 GRAM INTO THE VAGINA TWICE WEEKLY      famotidine (PEPCID) 20 mg tablet Take 20 mg by mouth daily.      fexofenadine (ALLEGRA) 180 mg  tablet Take 180 mg by mouth daily.        fluticasone propionate (FLONASE) 50 mcg/actuation nasal spray Administer 2 sprays into each nostril daily.      methenamine (HIPREX) 1 gram tablet TAKE ONE TABLET BY MOUTH TWO TIMES A DAY  FOR 90 DAYS --START   AFTER COMPLETING MACROBID      NOT IN DATABASE Pt use an additional inhale as needed. Name unknown to pt.      rosuvastatin (CRESTOR) 20 mg tablet Take 1 tablet (20 mg total) by mouth daily. 90 tablet 3    SYMBICORT 160-4.5 mcg/actuation inhaler        No current facility-administered medications for this visit.       Past Medical History:  Past Medical History:   Diagnosis Date    Abnormal ECG     Ascending aorta enlargement (CMS/HCC) 7/17/2019    The ascending aorta is mildly enlarged measuring approximately 4 cm diameter.  (6/6/2019)    Asthma     Coronary artery calcification seen on CT scan 7/17/2019    Mild coronary artery calcifications on CT of chest (6/6/2019)    GERD (gastroesophageal reflux disease)     Obstructive sleep apnea syndrome 1/15/2020    Opacity of lung on imaging study 7/17/2019    8mm subsolid opacity in the posterior right upper lobe, which may be inflammatory in nature (noted on CT of chest 6/6/2019)    Osteopenia 7/23/2020    Shortness of breath 7/17/2019    Stroke (CMS/HCC)     Vocal cord paralysis     compliacation of anterior cervical discectomy with fusion       Past Surgical History:  Past Surgical History:   Procedure Laterality Date    ANTERIOR CERVICAL DISCECTOMY W/ FUSION  10/2018    CATARACT EXTRACTION W/  INTRAOCULAR LENS IMPLANT Left 2017    COLONOSCOPY W/ POLYPECTOMY      colonoscopies every 3 years    EYE SURGERY Left 2017    fror replacement of wrong lens from cataract surgery 2 weeks prior    REDUCTION MAMMAPLASTY Bilateral 1988    SKIN BIOPSY      TONSILLECTOMY  1976    VOCAL CORD INJECTION Right 03/2019    for paralysis after acdf       Social History:  Social History     Socioeconomic History     Marital status:      Spouse name: None    Number of children: None    Years of education: None    Highest education level: None   Tobacco Use    Smoking status: Never    Smokeless tobacco: Never   Vaping Use    Vaping Use: Never used   Substance and Sexual Activity    Alcohol use: Yes     Comment: rare    Drug use: No    Sexual activity: Not Currently     Partners: Male       Family History:  Family History   Problem Relation Age of Onset    Alzheimer's disease Biological Mother     Colon cancer Biological Father     BEVERLY disease Biological Sister     Coronary artery disease Mother's Brother         in all 5 maternal uncles    No Known Problems Biological Son     No Known Problems Biological Son        Objective     Physical Exam  Visit Vitals  /70 (BP Location: Left upper arm, Patient Position: Sitting)   Pulse 74   SpO2 98%       General Appearance:  Alert, no distress, appears stated age  There was no parkinsonism   Head:  Normocephalic, without obvious abnormality, atraumatic   Eyes:  PERRL, conjunctiva/corneas clear, EOM's intact   Throat:  The tongue and uvula were midline   Neck: Supple, symmetrical, trachea midline, no carotid bruit or JVD   Lungs:  Clear to auscultation bilaterally, respirations unlabored   Chest Wall: No tenderness or deformity   Heart Regular rate and rhythm, S1 and S2 normal, no murmur, rub or gallop   Extremities: Extremities normal, atraumatic, no cyanosis or edema    Musculoskeletal: No injury or deformity   Pulses: 2+ and symmetric all extremities   Skin: Skin color, texture, turgor normal, no rashes or lesions   Behavior/Emotional: Appropriate, cooperative   Neurologic Exam:  Alert and oriented. Attention, concentration, memory, language, visual spatial orientation, executive function is normal.    Pupils equal round and reactive to light. Extraocular movement full with normal pursuit + saccades. No nystagmus noted.   Facial strength and sensation is  normal. Hearing normal.  The tongue and uvula were midline. No dysarthria or dysphagia.   Strength was 5/5 in bulbar, axial + extremity muscles; there was prominent giveaway weakness.There was normal bulk and tone with no abnormal movements.   The sensory examination was normal to touch, temperature and pain, vibration and proprioception. There was no dysmetria or cerebellar signs.   The gait was narrow based. Patient was able to tandem walk. Negative Romberg sign. Reflexes were ++ and symmetric. Guy sign was negative. Plantar responses were flexor.         Problem List Items Addressed This Visit        Other    Tremor - Primary    Current Assessment & Plan     The patient has a benign essential tremor.  I reassured her there is no evidence of parkinsonism or Parkinson's disease.  For now we will take a wait-and-see approach.  Her tremor at most is a mild nuisance which does not negatively impact her activities of daily living or quality of life in any meaningful way.         Relevant Orders    Comprehensive metabolic panel    Folate    TSH    Vitamin B12    Vitamin E    Lyme EIA reflex WB    Sedimentation rate, automated    RPR    MRI BRAIN WITHOUT CONTRAST    Ataxia    Current Assessment & Plan     The patient reported imbalance without any true neurologic symptoms.  We will check an MRI and blood work.  In the future she will benefit from physical therapy for notches strength and conditioning but special attention to her gait and balance.  We want to do everything we can to prevent a fall.         Relevant Orders    Comprehensive metabolic panel    Folate    TSH    Vitamin B12    Vitamin E    Lyme EIA reflex WB    Sedimentation rate, automated    RPR    MRI BRAIN WITHOUT CONTRAST    Ambulatory referral to Physical Therapy    Memory loss    Current Assessment & Plan     The patient reported cognitive impairment.  I reassured her that she does not have dementia.  She lives independently and performs all of her  activities of daily living without difficulty.  Her symptoms are likely related to her sleep.  She should follow with her sleep specialist.  We will check an MRI and blood work.  Additional diagnostics and treatments will depend on her clinical course and the results of her pending studies.         Relevant Orders    Comprehensive metabolic panel    Folate    TSH    Vitamin B12    Vitamin E    Lyme EIA reflex WB    Sedimentation rate, automated    RPR    MRI BRAIN WITHOUT CONTRAST    Weakness    Current Assessment & Plan     The patient reported intermittent weakness involving her left arm and leg.  I am uncertain as to the cause.  Her examination was nonfocal and there was evidence of nonphysiologic giveaway weakness.  We will check blood work and an MRI.         Relevant Orders    Ambulatory referral to Physical Therapy         It was a real pleasure meeting Yisel Lyle today, thank you for allowing me to participate in the medical care. If you have any questions, please call me at any time. Yisel Lyle will follow up with me in the coming weeks to months and keep me updated by telephone. Yisel Lyle knows to notify me immediately if there is any change in the condition or if there are any new symptoms of transient or static neurologic dysfunction.    Renato Diaz MD

## 2023-11-15 NOTE — ASSESSMENT & PLAN NOTE
The patient reported imbalance without any true neurologic symptoms.  We will check an MRI and blood work.  In the future she will benefit from physical therapy for notches strength and conditioning but special attention to her gait and balance.  We want to do everything we can to prevent a fall.

## 2023-11-15 NOTE — ASSESSMENT & PLAN NOTE
The patient has a benign essential tremor.  I reassured her there is no evidence of parkinsonism or Parkinson's disease.  For now we will take a wait-and-see approach.  Her tremor at most is a mild nuisance which does not negatively impact her activities of daily living or quality of life in any meaningful way.

## 2023-11-15 NOTE — ASSESSMENT & PLAN NOTE
The patient reported intermittent weakness involving her left arm and leg.  I am uncertain as to the cause.  Her examination was nonfocal and there was evidence of nonphysiologic giveaway weakness.  We will check blood work and an MRI.

## 2023-11-15 NOTE — ASSESSMENT & PLAN NOTE
The patient reported cognitive impairment.  I reassured her that she does not have dementia.  She lives independently and performs all of her activities of daily living without difficulty.  Her symptoms are likely related to her sleep.  She should follow with her sleep specialist.  We will check an MRI and blood work.  Additional diagnostics and treatments will depend on her clinical course and the results of her pending studies.

## 2023-12-01 DIAGNOSIS — I25.10 CORONARY ARTERY CALCIFICATION SEEN ON CT SCAN: ICD-10-CM

## 2023-12-01 RX ORDER — ROSUVASTATIN CALCIUM 20 MG/1
20 TABLET, COATED ORAL DAILY
Qty: 90 TABLET | Refills: 3 | Status: SHIPPED | OUTPATIENT
Start: 2023-12-01 | End: 2025-01-02 | Stop reason: SDUPTHER

## 2024-01-05 ENCOUNTER — LAB REQUISITION (OUTPATIENT)
Dept: LAB | Facility: HOSPITAL | Age: 74
End: 2024-01-05
Attending: PSYCHIATRY & NEUROLOGY
Payer: MEDICARE

## 2024-01-05 ENCOUNTER — LAB REQUISITION (OUTPATIENT)
Dept: LAB | Facility: HOSPITAL | Age: 74
End: 2024-01-05
Attending: INTERNAL MEDICINE
Payer: MEDICARE

## 2024-01-05 ENCOUNTER — HOSPITAL ENCOUNTER (OUTPATIENT)
Dept: RADIOLOGY | Facility: HOSPITAL | Age: 74
Discharge: HOME | End: 2024-01-05
Attending: PSYCHIATRY & NEUROLOGY
Payer: MEDICARE

## 2024-01-05 DIAGNOSIS — I25.10 ATHEROSCLEROTIC HEART DISEASE OF NATIVE CORONARY ARTERY WITHOUT ANGINA PECTORIS: ICD-10-CM

## 2024-01-05 DIAGNOSIS — R41.3 OTHER AMNESIA: ICD-10-CM

## 2024-01-05 DIAGNOSIS — R25.1 TREMOR: ICD-10-CM

## 2024-01-05 DIAGNOSIS — R27.0 ATAXIA: ICD-10-CM

## 2024-01-05 DIAGNOSIS — R41.3 MEMORY LOSS: ICD-10-CM

## 2024-01-05 DIAGNOSIS — R27.0 ATAXIA, UNSPECIFIED: ICD-10-CM

## 2024-01-05 DIAGNOSIS — R25.1 TREMOR, UNSPECIFIED: ICD-10-CM

## 2024-01-05 LAB
ALBUMIN SERPL-MCNC: 4.4 G/DL (ref 3.5–5.7)
ALBUMIN SERPL-MCNC: 4.4 G/DL (ref 3.5–5.7)
ALP SERPL-CCNC: 50 IU/L (ref 34–125)
ALP SERPL-CCNC: 50 IU/L (ref 34–125)
ALT SERPL-CCNC: 20 IU/L (ref 7–52)
ALT SERPL-CCNC: 21 IU/L (ref 7–52)
ANION GAP SERPL CALC-SCNC: 7 MEQ/L (ref 3–15)
AST SERPL-CCNC: 22 IU/L (ref 13–39)
AST SERPL-CCNC: 24 IU/L (ref 13–39)
BILIRUB DIRECT SERPL-MCNC: 0.2 MG/DL
BILIRUB SERPL-MCNC: 0.8 MG/DL (ref 0.3–1.2)
BILIRUB SERPL-MCNC: 0.8 MG/DL (ref 0.3–1.2)
BUN SERPL-MCNC: 16 MG/DL (ref 7–25)
CALCIUM SERPL-MCNC: 10 MG/DL (ref 8.6–10.3)
CHLORIDE SERPL-SCNC: 103 MEQ/L (ref 98–107)
CHOLEST SERPL-MCNC: 162 MG/DL
CO2 SERPL-SCNC: 29 MEQ/L (ref 21–31)
CREAT SERPL-MCNC: 0.8 MG/DL (ref 0.6–1.2)
EGFRCR SERPLBLD CKD-EPI 2021: >60 ML/MIN/1.73M*2
ERYTHROCYTE [SEDIMENTATION RATE] IN BLOOD BY WESTERGREN METHOD: 25 MM/HR
FOLATE SERPL-MCNC: >20 NG/ML
GLUCOSE SERPL-MCNC: 97 MG/DL (ref 70–99)
HDLC SERPL-MCNC: 87 MG/DL
HDLC SERPL: 1.9 {RATIO}
LDLC SERPL CALC-MCNC: 59 MG/DL
NONHDLC SERPL-MCNC: 75 MG/DL
POTASSIUM SERPL-SCNC: 4.7 MEQ/L (ref 3.5–5.1)
PROT SERPL-MCNC: 7 G/DL (ref 6–8.2)
PROT SERPL-MCNC: 7.1 G/DL (ref 6–8.2)
SODIUM SERPL-SCNC: 139 MEQ/L (ref 136–145)
TRIGL SERPL-MCNC: 82 MG/DL
TSH SERPL DL<=0.05 MIU/L-ACNC: 2.68 MIU/L (ref 0.34–5.6)
VIT B12 SERPL-MCNC: 478 PG/ML (ref 180–914)

## 2024-01-05 PROCEDURE — 86618 LYME DISEASE ANTIBODY: CPT | Performed by: PSYCHIATRY & NEUROLOGY

## 2024-01-05 PROCEDURE — 85652 RBC SED RATE AUTOMATED: CPT | Performed by: PSYCHIATRY & NEUROLOGY

## 2024-01-05 PROCEDURE — 80053 COMPREHEN METABOLIC PANEL: CPT | Performed by: INTERNAL MEDICINE

## 2024-01-05 PROCEDURE — 80061 LIPID PANEL: CPT | Performed by: INTERNAL MEDICINE

## 2024-01-05 PROCEDURE — 82248 BILIRUBIN DIRECT: CPT | Performed by: INTERNAL MEDICINE

## 2024-01-05 PROCEDURE — 36415 COLL VENOUS BLD VENIPUNCTURE: CPT | Performed by: PSYCHIATRY & NEUROLOGY

## 2024-01-05 PROCEDURE — 84446 ASSAY OF VITAMIN E: CPT | Performed by: PSYCHIATRY & NEUROLOGY

## 2024-01-05 PROCEDURE — G1004 CDSM NDSC: HCPCS

## 2024-01-05 PROCEDURE — 82746 ASSAY OF FOLIC ACID SERUM: CPT | Performed by: PSYCHIATRY & NEUROLOGY

## 2024-01-05 PROCEDURE — 86592 SYPHILIS TEST NON-TREP QUAL: CPT | Mod: GZ | Performed by: PSYCHIATRY & NEUROLOGY

## 2024-01-05 PROCEDURE — 82607 VITAMIN B-12: CPT | Performed by: PSYCHIATRY & NEUROLOGY

## 2024-01-05 PROCEDURE — 84443 ASSAY THYROID STIM HORMONE: CPT | Performed by: PSYCHIATRY & NEUROLOGY

## 2024-01-08 LAB
B BURGDOR AB SER IA-ACNC: 0.07 RATIO
RPR SER QL: NORMAL

## 2024-01-09 LAB
A-TOCOPHEROL VIT E SERPL-MCNC: 11.3 MG/L (ref 5.7–19.9)
BETA+GAMMA TOCOPHEROL SERPL-MCNC: 1.3 MG/L

## 2024-01-23 ENCOUNTER — TELEPHONE (OUTPATIENT)
Dept: SCHEDULING | Facility: CLINIC | Age: 74
End: 2024-01-23
Payer: MEDICARE

## 2024-02-15 ENCOUNTER — OFFICE VISIT (OUTPATIENT)
Dept: CARDIOLOGY | Facility: CLINIC | Age: 74
End: 2024-02-15
Payer: MEDICARE

## 2024-02-15 VITALS
OXYGEN SATURATION: 97 % | DIASTOLIC BLOOD PRESSURE: 82 MMHG | SYSTOLIC BLOOD PRESSURE: 124 MMHG | HEIGHT: 61 IN | BODY MASS INDEX: 26.24 KG/M2 | WEIGHT: 139 LBS | HEART RATE: 60 BPM

## 2024-02-15 DIAGNOSIS — I25.10 CORONARY ARTERY CALCIFICATION SEEN ON CT SCAN: ICD-10-CM

## 2024-02-15 DIAGNOSIS — I77.89 ASCENDING AORTA ENLARGEMENT (CMS/HCC): ICD-10-CM

## 2024-02-15 DIAGNOSIS — G45.9 TIA (TRANSIENT ISCHEMIC ATTACK): ICD-10-CM

## 2024-02-15 DIAGNOSIS — E78.00 HYPERCHOLESTEROLEMIA: Primary | ICD-10-CM

## 2024-02-15 PROCEDURE — 99214 OFFICE O/P EST MOD 30 MIN: CPT | Performed by: INTERNAL MEDICINE

## 2024-02-15 PROCEDURE — 93000 ELECTROCARDIOGRAM COMPLETE: CPT | Performed by: INTERNAL MEDICINE

## 2024-02-15 RX ORDER — BUDESONIDE 180 UG/1
AEROSOL, POWDER RESPIRATORY (INHALATION)
COMMUNITY
Start: 2024-02-04

## 2024-02-15 NOTE — PROGRESS NOTES
Albert Dale MD  Cardiology    [facility]    [facility]  [facility] [address]  [address] [place] [address]    TEL  [phone]  [facility]     02/15/24    Dear Dr. Madden:    It was my pleasure to see Yisel Lyle at the [facility] today for follow-up.    As you know, she is a 73 y.o. female with a history of vocal cord paralysis, laryngeal spasm and dyspnea, obstructive sleep apnea on CPAP therapy, TIA in November 2019, coronary calcifications, and a mildly enlarged ascending aorta.    Yisel has done well overall from a cardiovascular perspective since her last visit.  She has fully recovered from her previous respiratory illness.  Her breathing is at her baseline.  No chest pain or pressure.  No lower extremity edema, orthopnea, or PND.  No palpitations or syncope.  She is back to her regular exercise habits.  She is in physical therapy for balance and is working with neurology to workup and treat her other neurologic and memory concerns.  She is compliant with her medications and reports no side effects.    Past Medical History:  Past Medical History:   Diagnosis Date   • Abnormal ECG    • Ascending aorta enlargement (CMS/HCC) 7/17/2019    The ascending aorta is mildly enlarged measuring approximately 4 cm diameter.  (6/6/2019)   • Asthma    • Coronary artery calcification seen on CT scan 7/17/2019    Mild coronary artery calcifications on CT of chest (6/6/2019)   • GERD (gastroesophageal reflux disease)    • Obstructive sleep apnea syndrome 1/15/2020   • Opacity of lung on imaging study 7/17/2019    8mm subsolid opacity in the posterior right upper lobe, which may be inflammatory in nature (noted on CT of chest 6/6/2019)   • Osteopenia 7/23/2020   • Shortness of breath 7/17/2019   • Stroke (CMS/HCC)    • Vocal cord paralysis     compliacation of anterior cervical discectomy with fusion       Past Surgical History:  Past  Surgical History:   Procedure Laterality Date   • ANTERIOR CERVICAL DISCECTOMY W/ FUSION  10/2018   • CATARACT EXTRACTION W/  INTRAOCULAR LENS IMPLANT Left 2017   • COLONOSCOPY W/ POLYPECTOMY      colonoscopies every 3 years   • EYE SURGERY Left 2017    fror replacement of wrong lens from cataract surgery 2 weeks prior   • REDUCTION MAMMAPLASTY Bilateral 1988   • SKIN BIOPSY     • TONSILLECTOMY  1976   • VOCAL CORD INJECTION Right 03/2019    for paralysis after acdf       Medications:  Current Outpatient Medications   Medication Sig Dispense Refill   • albuterol HFA (VENTOLIN HFA) 90 mcg/actuation inhaler Inhale 2 puffs daily.     • ascorbic acid (VITAMIN C ORAL) Take 250 mg by mouth daily.     • aspirin 81 mg enteric coated tablet Take 81 mg by mouth daily.     • cholecalciferol, vitamin D3, 50 mcg (2,000 unit) capsule Take 2,000 Units by mouth daily. 2 capsules 4000 IU daily      • clindamycin (CLEOCIN T) 1 % external solution Apply topically as needed.     • diphenhydrAMINE (BENADRYL) 50 mg capsule Take 1 capsule (50 mg total) by mouth See admin instr. 1 hour prior to study 1 capsule 3   • estradioL (ESTRACE) 0.01 % (0.1 mg/gram) vaginal cream APPLY 1 GRAM INTO THE VAGINA TWICE WEEKLY     • famotidine (PEPCID) 20 mg tablet Take 20 mg by mouth daily.     • fexofenadine (ALLEGRA) 180 mg tablet Take 180 mg by mouth daily.       • fluticasone propionate (FLONASE) 50 mcg/actuation nasal spray Administer 2 sprays into each nostril daily.     • methenamine (HIPREX) 1 gram tablet Take 1 g by mouth daily.     • NOT IN DATABASE Pt use an additional inhale as needed. Name unknown to pt.     • PULMICORT FLEXHALER 180 mcg/actuation inhaler INHALE TWO PUFFS BY MOUTH EVERY DAY - RINSE MOUTH AFTER USE     • rosuvastatin (CRESTOR) 20 mg tablet TAKE ONE TABLET BY MOUTH EVERY DAY 90 tablet 3   • SYMBICORT 160-4.5 mcg/actuation inhaler        No current facility-administered medications for this visit.       Allergies: Iodine and  iodide containing products, Venom-honey bee, and Anesthesia s/i-40 (propofol) [propofol]    Social History: She is a retired .  She has 2 children.  She is a never smoker.  Rare alcohol use.  No recreational drugs.    Family History: All 5 maternal uncles had coronary disease.  No family history of premature coronary artery disease, arrhythmias, cardiomyopathies, or sudden cardiac death.    Review of Systems: A complete 14-point review of systems is negative, except as noted in the HPI.    Exam:  Objective   Vitals:    02/15/24 1102   BP: 124/82   Pulse: 60   SpO2: 97%     Body mass index is 26.26 kg/m².  Constitutional: Appears comfortable.  Eyes: No icterus.   ENT: Deferred.  Neck: No jugular venous distention.   Vascular: No carotid bruits.   Cardiac: Normal S1 and S2, regular rhythm.  1/6 murmur at the base.  No rubs or gallops appreciated.  Lungs: Clear to auscultation bilaterally.   GI: Soft, normoactive bowel sounds.  Extremities: Warm. No lower extremity edema.   Skin: Dry.   Neurologic: Awake, alert, oriented.    Psychiatric: No agitation.    Wt Readings from Last 3 Encounters:   02/15/24 63 kg (139 lb)   09/12/23 61.2 kg (135 lb)   06/02/23 59 kg (130 lb)     Labs: Personally reviewed and discussed with the patient.  Notable for the following.   Lab Results   Component Value Date    LDLCALC 59 01/05/2024    CHOL 162 01/05/2024    TRIG 82 01/05/2024    HDL 87 01/05/2024     01/05/2024    K 4.7 01/05/2024    BUN 16 01/05/2024    CREATININE 0.8 01/05/2024    WBC 5.77 06/18/2019    HGB 14.0 06/18/2019     06/18/2019     Lab Results   Component Value Date    ALT 21 01/05/2024    ALT 20 01/05/2024    AST 24 01/05/2024    AST 22 01/05/2024    ALKPHOS 50 01/05/2024    ALKPHOS 50 01/05/2024    BILITOT 0.8 01/05/2024    BILITOT 0.8 01/05/2024     Cardiovascular Studies:   1.  Stress echocardiogram, 10/2018: Negative for ischemia.  Fair-good exercise tolerance. Rest echo-normal LV  wall thickness, LVEF 65%, normal RV systolic function, no significant valvular disease noted.  Ascending aorta 3.8 cm.  No mention of the aortic valve.  2.  CT chest, 6/2019: Ascending aorta 4 cm  3.  TTE, 2/21: Normal LV size, focal basal septal hypertrophy, LVEF 65%, no WMAs. Normal RV size/function. Tricuspid aortic valve. Ascending aorta 4 cm.   4.  Chest MRI (no contrast), 4/2022: Ascending aorta 39 mm.  5.  Chest MRA, 3/2023: Ascending aorta 38 mm.  6.  TTE, 6/2023: Normal LV size, focal basal septal hypertrophy, LVEF 65%. No WMAs. Normal RV size/function. Tricuspid AV. No significant valve disease. Ascending aorta 39mm. No pericardial effusion.     ECG from today personally reviewed and discussed with the patient shows sinus bradycardia with first-degree AV block.     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  Dr. Khalil previously suspected additional nerve damage may have developed resulting in dysfunction of her upper airway musculature and producing her exertional dyspnea.  She has no signs of heart failure.  We will continue to monitor clinically.  She knows to contact me should her symptoms progress in any way.     Coronary calcification incidentally noted on CT scan 6/19:  No anginal symptoms.  She had a negative stress test for ischemia in October 2018.  She will remain on aspirin and lipid-lowering therapy.     Hypercholesterolemia:  Her LDL has improved on her most recent profile.  Her LDL is below threshold.  I made no changes to her regimen.  Will repeat a lipid profile before her follow-up visit.     Mildly dilated ascending aorta:  She continues to follow with the aortic center. Her echocardiogram showed a tricuspid aortic valve.  Blood pressure controlled.     History of TIA/small vessel ischemia:  Blood pressure reasonably controlled.   LDL management as above.  She is now following with neurology.      It was my pleasure to visit with Yisel in clinic today.  She will follow up with me in 6 months.  Please do not hesitate to contact me with any questions.      Sincerely,         ________________  Albert Dale MD

## 2024-02-20 ENCOUNTER — TELEPHONE (OUTPATIENT)
Dept: SCHEDULING | Facility: CLINIC | Age: 74
End: 2024-02-20
Payer: MEDICARE

## 2024-02-20 NOTE — TELEPHONE ENCOUNTER
Pt calling to get labs results from 1/05/24 mailed to address   [address]   [place] [address]

## 2024-03-07 NOTE — TELEPHONE ENCOUNTER
Please print and mail lab results to pt at the address mentioned below   [facility] / [facility]  ECT History & Physical    Curlee Signs Patient Status:  Outpatient   Age/Gender 34year old female MRN [id number]   Location [address] [facility] Attending Joseph Snider MD   Hosp Day # 0 PCP Letty Olmos Normocephalic, without obvious abnormality, atraumatic   Eyes:    PERRL, conjunctiva/corneas clear, EOM's intact       Nose:   Nares normal, septum midline, mucosa normal, no drainage    or sinus tenderness   Throat:   Lips, mucosa, and tongue normal; maría

## 2024-03-07 NOTE — TELEPHONE ENCOUNTER
Pt called, states she did not receive 1/5/24 lab results and wants to have those lab results mailed to her home address    Pt can be reached at [phone]

## 2024-03-15 ENCOUNTER — OFFICE VISIT (OUTPATIENT)
Dept: NEUROLOGY | Facility: CLINIC | Age: 74
End: 2024-03-15
Payer: MEDICARE

## 2024-03-15 VITALS — DIASTOLIC BLOOD PRESSURE: 78 MMHG | SYSTOLIC BLOOD PRESSURE: 120 MMHG | HEART RATE: 74 BPM | OXYGEN SATURATION: 97 %

## 2024-03-15 DIAGNOSIS — I73.9 SMALL VESSEL DISEASE (CMS/HCC): ICD-10-CM

## 2024-03-15 DIAGNOSIS — G47.33 OBSTRUCTIVE SLEEP APNEA SYNDROME: ICD-10-CM

## 2024-03-15 DIAGNOSIS — R27.0 ATAXIA: Primary | ICD-10-CM

## 2024-03-15 DIAGNOSIS — R41.3 MEMORY LOSS: ICD-10-CM

## 2024-03-15 DIAGNOSIS — R25.1 TREMOR: ICD-10-CM

## 2024-03-15 PROCEDURE — 99214 OFFICE O/P EST MOD 30 MIN: CPT | Performed by: PSYCHIATRY & NEUROLOGY

## 2024-03-15 ASSESSMENT — PAIN SCALES - GENERAL: PAINLEVEL: 0-NO PAIN

## 2024-03-15 NOTE — ASSESSMENT & PLAN NOTE
The patient reported difficulty focusing and concentrating.  I do not think she has dementia.  Her symptoms may be related to sleep or mood issues.  In the future, she could consider an evaluation at the [place] [facility] [place] if her symptoms do not improve with conservative measures.

## 2024-03-15 NOTE — ASSESSMENT & PLAN NOTE
The patient has a benign tremor.  In the future there are medications which can help however for the most part this is now a mild nuisance which does not negatively impact her activities of daily living or quality of life in any meaningful way.

## 2024-03-15 NOTE — ASSESSMENT & PLAN NOTE
The patient reported imbalance when standing.  This may be multifactorial in nature.  I cannot think of any additional diagnostics which would be informative or change her medical management in any meaningful way.  There are no medications which can help and she needs to remain as physically active as possible.

## 2024-03-15 NOTE — ASSESSMENT & PLAN NOTE
The patient has small vessel disease however sleeps poorly.  This may be the cause of her cognitive dysfunction.  She should follow closely with her sleep specialist.

## 2024-03-15 NOTE — ASSESSMENT & PLAN NOTE
The patient has small vessel disease.  This can contribute to cognitive dysfunction and imbalance.  She will continue best medical management for stroke and vascular prophylaxis.

## 2024-03-15 NOTE — LETTER
March 15, 2024     Aylin Madden MD  [address]  [address]  [place] [address]    Patient: Yisel Lyle  YOB: 1950  Date of Visit: 3/15/2024      Dear Dr. Madden:    Thank you for referring Yisel Lyle to me for evaluation. Below are my notes for this consultation.    If you have questions, please do not hesitate to call me. I look forward to following your patient along with you.         Sincerely,        Renato Diaz MD        CC: No Recipients    Renato Diaz MD  3/15/2024 12:01 PM  Signed  Yisel Lyle is a 73 y.o. female  3/15/2024  Aylin Madden MD    Neurology Follow Up Note    Subjective     Yisel Lyle is a 73 y.o. female who is being evaluated  for multiple somatic complaints.  I previously saw the patient months ago.  At that time I had recommended an MRI and blood work.    Since her last visit, the patient reported that she continues to have significant difficulty with her balance.  She continues however to not be able to explain exactly why.  She reported that she has no difficulty when walking however when she stops she feels off balance.  She has trouble standing in 1 spot.  She does not feel lightheaded or dizzy.  There are no changes in her vision.  She denied numbness or weakness.  She has not fallen or injured herself and occasionally uses a cane.    The patient reported that she continues to have sporadic weakness on the left side of uncertain etiology.  She continues to have a tremor left greater than right.  She reported difficulty with mental focus.  She has a hard time concentrating.  She sleeps quite poorly with a CPAP.  Detailed neurologic and medical review of systems was otherwise unremarkable.  There were no symptoms to suggest increased intracranial pressure, meningitis or systemic illness.    Review of Systems  Constitutional: negative  Eyes: negative  Ears, nose, mouth, throat, and face: negative  Respiratory:  negative  Cardiovascular: negative  Gastrointestinal: negative  Genitourinary:negative  Integument/breast: negative  Hematologic/lymphatic: negative  Musculoskeletal:negative  Neurological: negative  Behavioral/Psych: negative  Endocrine: negative  Allergic/Immunologic: negative    Current Outpatient Medications   Medication Sig Dispense Refill   • albuterol HFA (VENTOLIN HFA) 90 mcg/actuation inhaler Inhale 2 puffs daily.     • ascorbic acid (VITAMIN C ORAL) Take 250 mg by mouth daily.     • aspirin 81 mg enteric coated tablet Take 81 mg by mouth daily.     • cholecalciferol, vitamin D3, 50 mcg (2,000 unit) capsule Take 2,000 Units by mouth daily. 2 capsules 4000 IU daily      • clindamycin (CLEOCIN T) 1 % external solution Apply topically as needed.     • diphenhydrAMINE (BENADRYL) 50 mg capsule Take 1 capsule (50 mg total) by mouth See admin instr. 1 hour prior to study 1 capsule 3   • estradioL (ESTRACE) 0.01 % (0.1 mg/gram) vaginal cream APPLY 1 GRAM INTO THE VAGINA TWICE WEEKLY     • famotidine (PEPCID) 20 mg tablet Take 20 mg by mouth daily.     • fexofenadine (ALLEGRA) 180 mg tablet Take 180 mg by mouth daily.       • fluticasone propionate (FLONASE) 50 mcg/actuation nasal spray Administer 2 sprays into each nostril daily.     • methenamine (HIPREX) 1 gram tablet Take 1 g by mouth daily.     • NOT IN DATABASE Pt use an additional inhale as needed. Name unknown to pt.     • PULMICORT FLEXHALER 180 mcg/actuation inhaler INHALE TWO PUFFS BY MOUTH EVERY DAY - RINSE MOUTH AFTER USE     • rosuvastatin (CRESTOR) 20 mg tablet TAKE ONE TABLET BY MOUTH EVERY DAY 90 tablet 3     No current facility-administered medications for this visit.       PMH/SH/FH : Unchanged since previous visit.    Objective     Physical Exam  Visit Vitals  /78 (BP Location: Left upper arm, Patient Position: Sitting)   Pulse 74   SpO2 97%       General Appearance:  Alert, no distress, appears stated age     Data Reviewed:  I personally  reviewed an MRI of the brain which showed small vessel disease.    Blood work was notable for a normal B12, folate, metabolic panel, TSH, Lyme, RPR, ESR and vitamin EE.    Problem List Items Addressed This Visit        Nervous    Obstructive sleep apnea syndrome    Current Assessment & Plan     The patient has small vessel disease however sleeps poorly.  This may be the cause of her cognitive dysfunction.  She should follow closely with her sleep specialist.            Circulatory    Small vessel disease (CMS/HCC)    Current Assessment & Plan     The patient has small vessel disease.  This can contribute to cognitive dysfunction and imbalance.  She will continue best medical management for stroke and vascular prophylaxis.            Other    Tremor    Current Assessment & Plan     The patient has a benign tremor.  In the future there are medications which can help however for the most part this is now a mild nuisance which does not negatively impact her activities of daily living or quality of life in any meaningful way.         Ataxia - Primary    Current Assessment & Plan     The patient reported imbalance when standing.  This may be multifactorial in nature.  I cannot think of any additional diagnostics which would be informative or change her medical management in any meaningful way.  There are no medications which can help and she needs to remain as physically active as possible.         Memory loss    Current Assessment & Plan     The patient reported difficulty focusing and concentrating.  I do not think she has dementia.  Her symptoms may be related to sleep or mood issues.  In the future, she could consider an evaluation at the [place] [facility] if her symptoms do not improve with conservative measures.            It was a real pleasure treating Yisel Lyle today, thank you for allowing me to participate in the medical care. If you have any questions, please call me at any time. Yisel Lyle will  follow up with me in the coming weeks to months and keep me updated by telephone. Yisel Lyle knows to notify me immediately if there is any change in the condition or if there are any new symptoms of transient or static neurologic dysfunction.    Renato Diaz MD

## 2024-03-15 NOTE — PROGRESS NOTES
Yisel Lyle is a 73 y.o. female  3/15/2024  Aylin Madden MD    Neurology Follow Up Note    Subjective     Yisel Lyle is a 73 y.o. female who is being evaluated  for multiple somatic complaints.  I previously saw the patient months ago.  At that time I had recommended an MRI and blood work.    Since her last visit, the patient reported that she continues to have significant difficulty with her balance.  She continues however to not be able to explain exactly why.  She reported that she has no difficulty when walking however when she stops she feels off balance.  She has trouble standing in 1 spot.  She does not feel lightheaded or dizzy.  There are no changes in her vision.  She denied numbness or weakness.  She has not fallen or injured herself and occasionally uses a cane.    The patient reported that she continues to have sporadic weakness on the left side of uncertain etiology.  She continues to have a tremor left greater than right.  She reported difficulty with mental focus.  She has a hard time concentrating.  She sleeps quite poorly with a CPAP.  Detailed neurologic and medical review of systems was otherwise unremarkable.  There were no symptoms to suggest increased intracranial pressure, meningitis or systemic illness.    Review of Systems  Constitutional: negative  Eyes: negative  Ears, nose, mouth, throat, and face: negative  Respiratory: negative  Cardiovascular: negative  Gastrointestinal: negative  Genitourinary:negative  Integument/breast: negative  Hematologic/lymphatic: negative  Musculoskeletal:negative  Neurological: negative  Behavioral/Psych: negative  Endocrine: negative  Allergic/Immunologic: negative    Current Outpatient Medications   Medication Sig Dispense Refill   • albuterol HFA (VENTOLIN HFA) 90 mcg/actuation inhaler Inhale 2 puffs daily.     • ascorbic acid (VITAMIN C ORAL) Take 250 mg by mouth daily.     • aspirin 81 mg enteric coated tablet Take 81 mg by mouth daily.      • cholecalciferol, vitamin D3, 50 mcg (2,000 unit) capsule Take 2,000 Units by mouth daily. 2 capsules 4000 IU daily      • clindamycin (CLEOCIN T) 1 % external solution Apply topically as needed.     • diphenhydrAMINE (BENADRYL) 50 mg capsule Take 1 capsule (50 mg total) by mouth See admin instr. 1 hour prior to study 1 capsule 3   • estradioL (ESTRACE) 0.01 % (0.1 mg/gram) vaginal cream APPLY 1 GRAM INTO THE VAGINA TWICE WEEKLY     • famotidine (PEPCID) 20 mg tablet Take 20 mg by mouth daily.     • fexofenadine (ALLEGRA) 180 mg tablet Take 180 mg by mouth daily.       • fluticasone propionate (FLONASE) 50 mcg/actuation nasal spray Administer 2 sprays into each nostril daily.     • methenamine (HIPREX) 1 gram tablet Take 1 g by mouth daily.     • NOT IN DATABASE Pt use an additional inhale as needed. Name unknown to pt.     • PULMICORT FLEXHALER 180 mcg/actuation inhaler INHALE TWO PUFFS BY MOUTH EVERY DAY - RINSE MOUTH AFTER USE     • rosuvastatin (CRESTOR) 20 mg tablet TAKE ONE TABLET BY MOUTH EVERY DAY 90 tablet 3     No current facility-administered medications for this visit.       PMH/SH/FH : Unchanged since previous visit.    Objective     Physical Exam  Visit Vitals  /78 (BP Location: Left upper arm, Patient Position: Sitting)   Pulse 74   SpO2 97%       General Appearance:  Alert, no distress, appears stated age     Data Reviewed:  I personally reviewed an MRI of the brain which showed small vessel disease.    Blood work was notable for a normal B12, folate, metabolic panel, TSH, Lyme, RPR, ESR and vitamin EE.    Problem List Items Addressed This Visit        Nervous    Obstructive sleep apnea syndrome    Current Assessment & Plan     The patient has small vessel disease however sleeps poorly.  This may be the cause of her cognitive dysfunction.  She should follow closely with her sleep specialist.            Circulatory    Small vessel disease (CMS/HCC)    Current Assessment & Plan     The patient  has small vessel disease.  This can contribute to cognitive dysfunction and imbalance.  She will continue best medical management for stroke and vascular prophylaxis.            Other    Tremor    Current Assessment & Plan     The patient has a benign tremor.  In the future there are medications which can help however for the most part this is now a mild nuisance which does not negatively impact her activities of daily living or quality of life in any meaningful way.         Ataxia - Primary    Current Assessment & Plan     The patient reported imbalance when standing.  This may be multifactorial in nature.  I cannot think of any additional diagnostics which would be informative or change her medical management in any meaningful way.  There are no medications which can help and she needs to remain as physically active as possible.         Memory loss    Current Assessment & Plan     The patient reported difficulty focusing and concentrating.  I do not think she has dementia.  Her symptoms may be related to sleep or mood issues.  In the future, she could consider an evaluation at the [place] [facility] if her symptoms do not improve with conservative measures.            It was a real pleasure treating Yisel Lyle today, thank you for allowing me to participate in the medical care. If you have any questions, please call me at any time. Yisel Lyle will follow up with me in the coming weeks to months and keep me updated by telephone. Yisel Lyle knows to notify me immediately if there is any change in the condition or if there are any new symptoms of transient or static neurologic dysfunction.    Renato Diaz MD

## 2024-03-26 ENCOUNTER — TELEPHONE (OUTPATIENT)
Dept: SCHEDULING | Age: 74
End: 2024-03-26
Payer: MEDICARE

## 2024-03-26 DIAGNOSIS — I71.21 ANEURYSM OF ASCENDING AORTA WITHOUT RUPTURE (CMS/HCC): Primary | ICD-10-CM

## 2024-03-26 NOTE — TELEPHONE ENCOUNTER
Patient has an upcoming appointment. Patient needs MRI done. Please enter new order and labs.. One's we have will  soon.

## 2024-04-02 NOTE — TELEPHONE ENCOUNTER
Mrs. Lyle called with test related questions:    Do I need to fast for my creatinine and BUN? I told her no.    2. Do I need to fast for my MRA of the chest with and without contrast? I let her know per outlined pre procedural detailing this is not necessary.    Just an FYI-pt can be reached at [phone] if needed.

## 2024-04-03 NOTE — PROGRESS NOTES
[facility]  Dr. Duglas Haley- System Chief     Enma MOORE  Cardiothoracic Surgery  [facility]  [phone]     Reason for visit: ascending aortic aneurysm    Referring Provider: Cardiologist: Dr. Albert Dale  PCP: Aylin Madden MD   HPI    Yisel Lyle is a 74 y.o. female with PMHx known ascending aortic aneurysm (4cm in 2019 on CT chest), TIA 2019, asthma, GERD, RACHELL on CPAP, osteopenia, vocal cord paralysis following anterior cervical discectomy with fusion who presents today for aneurysm surveillance.     She established care on 4/21/22 and Cardiac MRA on 4/7/22 revealed stable ascending aortic dilatation measuring 3.8cm x 3.9 cm. Pt has a contrast dye allergy, requested MRI over CT with prep. Pt denies family history of aneurysms. She is a non smoker. She does not lift anything over 30 pounds since her anterior cervical fusion. She reports blood pressure is well controlled.     She was last seen on 4/21/23 and at that time MRA on independent review showed ascending aorta measured 3.6cm on sagittal view and  3.9cm on coronal view at the level of the pulm artery.    She was asked to return in a year with a cardiac MRI.  Since being seen last she reports she is feeling well . No chest pain or palpitations. BP is well controlled she walks 58000 steps per day and has no shortness of breath     She reports asthma has been a bit worse recently with the pollen but she is stable . No lightheadedness dizziness.     She is a non smoker, no heavy lifting          Past Medical History:   Diagnosis Date    Abnormal ECG     Ascending aorta enlargement (CMS/HCC) 7/17/2019    The ascending aorta is mildly enlarged measuring approximately 4 cm diameter.  (6/6/2019)    Asthma     Coronary artery calcification seen on CT scan 7/17/2019    Mild coronary artery calcifications on CT of chest (6/6/2019)    GERD (gastroesophageal reflux disease)     Obstructive sleep apnea syndrome  1/15/2020    Opacity of lung on imaging study 7/17/2019    8mm subsolid opacity in the posterior right upper lobe, which may be inflammatory in nature (noted on CT of chest 6/6/2019)    Osteopenia 7/23/2020    Shortness of breath 7/17/2019    Stroke (CMS/HCC)     Vocal cord paralysis     compliacation of anterior cervical discectomy with fusion     Past Surgical History:   Procedure Laterality Date    ANTERIOR CERVICAL DISCECTOMY W/ FUSION  10/2018    CATARACT EXTRACTION W/  INTRAOCULAR LENS IMPLANT Left 2017    COLONOSCOPY W/ POLYPECTOMY      colonoscopies every 3 years    EYE SURGERY Left 2017    fror replacement of wrong lens from cataract surgery 2 weeks prior    REDUCTION MAMMAPLASTY Bilateral 1988    SKIN BIOPSY      TONSILLECTOMY  1976    VOCAL CORD INJECTION Right 03/2019    for paralysis after acdf     Iodine and iodide containing products, Venom-honey bee, and Anesthesia s/i-40 (propofol) [propofol]    Social History     Socioeconomic History    Marital status:    Tobacco Use    Smoking status: Never    Smokeless tobacco: Never   Vaping Use    Vaping Use: Never used   Substance and Sexual Activity    Alcohol use: Yes     Comment: rare    Drug use: No    Sexual activity: Not Currently     Partners: Male     Family History   Problem Relation Age of Onset    Alzheimer's disease Biological Mother     Colon cancer Biological Father     BEVERLY disease Biological Sister     Coronary artery disease Mother's Brother         in all 5 maternal uncles    No Known Problems Biological Son     No Known Problems Biological Son         Current Outpatient Medications:     albuterol HFA (VENTOLIN HFA) 90 mcg/actuation inhaler, Inhale 2 puffs daily., Disp: , Rfl:     ascorbic acid (VITAMIN C ORAL), Take 250 mg by mouth daily., Disp: , Rfl:     aspirin 81 mg enteric coated tablet, Take 81 mg by mouth daily., Disp: , Rfl:     cholecalciferol, vitamin D3, 50 mcg (2,000 unit) capsule, Take 2,000 Units by mouth daily. 2  capsules 4000 IU daily , Disp: , Rfl:     clindamycin (CLEOCIN T) 1 % external solution, Apply topically as needed., Disp: , Rfl:     diphenhydrAMINE (BENADRYL) 50 mg capsule, Take 1 capsule (50 mg total) by mouth See admin instr. 1 hour prior to study, Disp: 1 capsule, Rfl: 3    estradioL (ESTRACE) 0.01 % (0.1 mg/gram) vaginal cream, APPLY 1 GRAM INTO THE VAGINA TWICE WEEKLY, Disp: , Rfl:     famotidine (PEPCID) 20 mg tablet, Take 20 mg by mouth daily., Disp: , Rfl:     fexofenadine (ALLEGRA) 180 mg tablet, Take 180 mg by mouth daily.  , Disp: , Rfl:     fluticasone propionate (FLONASE) 50 mcg/actuation nasal spray, Administer 2 sprays into each nostril daily., Disp: , Rfl:     methenamine (HIPREX) 1 gram tablet, Take 1 g by mouth daily., Disp: , Rfl:     NOT IN DATABASE, Pt use an additional inhale as needed. Name unknown to pt., Disp: , Rfl:     PULMICORT FLEXHALER 180 mcg/actuation inhaler, INHALE TWO PUFFS BY MOUTH EVERY DAY - RINSE MOUTH AFTER USE, Disp: , Rfl:     rosuvastatin (CRESTOR) 20 mg tablet, TAKE ONE TABLET BY MOUTH EVERY DAY, Disp: 90 tablet, Rfl: 3    ROS as above    Objective    Vitals:    04/26/24 1037   BP: 129/80   Pulse: 95   Resp: 18   Temp: 36.2 °C (97.1 °F)   SpO2: 94%        Physical Exam  Constitutional:       General: She is not in acute distress.     Appearance: Normal appearance. She is normal weight.   HENT:      Head: Normocephalic and atraumatic.   Eyes:      Extraocular Movements: Extraocular movements intact.      Conjunctiva/sclera: Conjunctivae normal.   Cardiovascular:      Rate and Rhythm: Normal rate and regular rhythm.      Heart sounds: Normal heart sounds.   Pulmonary:      Effort: Pulmonary effort is normal. No respiratory distress.      Breath sounds: Normal breath sounds.   Musculoskeletal:         General: Normal range of motion.      Right lower leg: No edema.      Left lower leg: No edema.   Skin:     General: Skin is warm and dry.   Neurological:      General: No  focal deficit present.      Mental Status: She is alert and oriented to person, place, and time.   Psychiatric:         Mood and Affect: Mood normal.         Behavior: Behavior normal.         Thought Content: Thought content normal.           Imaging    MRI Chest 4/26/24: personally reviewed    Narrative & Impression      CLINICAL HISTORY: Aneurysm of the ascending thoracic aorta.     COMMENT:     Magnetic resonance angiography of the chest with contrast.     Comparison studies: 3/29/2023.     A variety of sequences were obtained using IV contrast in axial, sagittal and  coronal planes.     contrast: 6.3 mL of Vueway was administered intravenously.     No evidence of acute thoracic aortic dissection or evidence of acute intramural  hematoma. The ascending thoracic aorta measures up to 38 x 35 mm in diameter at  the level of the right pulmonary artery and 38 mm diameter at the level of the  sinus of Valsalva. This is unchanged compared to prior examination.     The celiac artery and superior mesenteric artery appear to be grossly patent.     No evidence of pathologically enlarged mediastinal hilar lymph nodes. Probable  cysts noted within the left kidney. Otherwise imaged soft tissues are  unremarkable for technique.     --  IMPRESSION:     Stable appearance of the thoracic aorta as described above.     TTE 6/2/23:  Interpretation Summary    Suboptimal image quality.    1. The left ventricle is normal in size. There is normal wall thickness with focal basal septal hypertrophy. There is normal left ventricular systolic function. Left ventricular ejection fraction measures 65% by biplane method of discs. There is normal wall motion. Normal diastolic filling.  No significant left ventricular outflow tract gradient.  2. The right ventricle is normal in size. There is normal right ventricular systolic function. Right ventricular S' by tissue Doppler measures 10 cm/s.  3. The left atrium is normal in size.  4. The right  atrium is normal in size.  5. The aortic valve has three cusps.  The leaflets are thickened. There is no aortic stenosis. There is no aortic regurgitation.  6. The mitral valve is thickened. There is trace mitral regurgitation.  7. The tricuspid valve is normal. There is trace tricuspid valve regurgitation. The estimated right ventricular systolic pressure = 17 mmHg plus right atrial pressure.  8. The pulmonic valve is not well seen. There is mild pulmonic valve regurgitation.  9. The aortic root is normal in size.  Aortic root sclerosis.  The ascending aorta is 3.9 cm in diameter. The aortic arch is not well seen.  10. The inferior vena cava measures less than 2.1 cm and collapses greater than 50% with inspiration. The estimated right atrial pressure is 0-5 mmHg.  11. No pericardial effusion.  12. Compared with the prior study from 2/25/2021, there is no significant change.         Assessment/Plan      Assessment: Thoracic Aortic Aneurysm  Yisel Lyle is a 74 y.o. female seen today in consultation for ascending aortic aneurysm. Pt hs a contrast dye allergy, requested MRA instead of CT. MRA on independent review shows Ascending aorta measures 3.6 cm on axial view and  3.8 cm on coronal view at the level of the pulm artery.  She denies any family history of cardiac disease including aortic aneurysm, valvular pathology, and sudden death.      Aortic Size Index (size/body surface area): 2.36 The patient is in the low risk category for risk of dissection or rupture.   Ratio Surface/Height: 7.32  No indications for surgery     Plan:  - return to our [facility] in 2 years with MRI chest   -continue to follow with cardiology       Additional recommendations   Yisel Lyle was educated on proper aortic precautions to take from this point forward. She was asked to maintain adequate blood pressure control (with systolic <140) and to avoid lifting anything in excess of 40 lbs.  Additionally, the patient was asked to  continue to refrain from smoking as this could exacerbate their current condition.  Lastly, in the unlikely event that the patient experience severe tearing/ripping chest or back pain, she was instructed to go to the nearest emergency department immediately and inform the team that they are under Dr. Haley’s care.     All questions were answered and the patient verbalized understanding. Yisel Lyle was encouraged to call us in the interim with any additional questions or concerns.        Thank you for the opportunity participate in this patient's care.  Please call [phone] with any questions or concerns.     TERESA Johnson 4/26/2024  3:12 PM

## 2024-04-17 ENCOUNTER — APPOINTMENT (OUTPATIENT)
Age: 74
Setting detail: DERMATOLOGY
End: 2024-04-17

## 2024-04-17 DIAGNOSIS — L81.4 OTHER MELANIN HYPERPIGMENTATION: ICD-10-CM

## 2024-04-17 DIAGNOSIS — D22 MELANOCYTIC NEVI: ICD-10-CM

## 2024-04-17 DIAGNOSIS — L57.0 ACTINIC KERATOSIS: ICD-10-CM

## 2024-04-17 DIAGNOSIS — D18.0 HEMANGIOMA: ICD-10-CM

## 2024-04-17 DIAGNOSIS — L82.1 OTHER SEBORRHEIC KERATOSIS: ICD-10-CM

## 2024-04-17 DIAGNOSIS — L21.8 OTHER SEBORRHEIC DERMATITIS: ICD-10-CM

## 2024-04-17 DIAGNOSIS — L57.8 OTHER SKIN CHANGES DUE TO CHRONIC EXPOSURE TO NONIONIZING RADIATION: ICD-10-CM

## 2024-04-17 PROBLEM — D22.61 MELANOCYTIC NEVI OF RIGHT UPPER LIMB, INCLUDING SHOULDER: Status: ACTIVE | Noted: 2024-04-17

## 2024-04-17 PROBLEM — D18.01 HEMANGIOMA OF SKIN AND SUBCUTANEOUS TISSUE: Status: ACTIVE | Noted: 2024-04-17

## 2024-04-17 PROCEDURE — 99214 OFFICE O/P EST MOD 30 MIN: CPT | Mod: 25

## 2024-04-17 PROCEDURE — OTHER PRESCRIPTION: OTHER

## 2024-04-17 PROCEDURE — 17000 DESTRUCT PREMALG LESION: CPT

## 2024-04-17 PROCEDURE — OTHER COUNSELING: OTHER

## 2024-04-17 PROCEDURE — OTHER LIQUID NITROGEN: OTHER

## 2024-04-17 PROCEDURE — OTHER MIPS QUALITY: OTHER

## 2024-04-17 PROCEDURE — OTHER SUNSCREEN RECOMMENDATIONS: OTHER

## 2024-04-17 RX ORDER — HYDROCORTISONE 25 MG/G
CREAM TOPICAL
Qty: 28 | Refills: 1 | Status: ERX | COMMUNITY
Start: 2024-04-17

## 2024-04-17 ASSESSMENT — LOCATION ZONE DERM
LOCATION ZONE: ARM
LOCATION ZONE: FACE
LOCATION ZONE: TRUNK
LOCATION ZONE: HAND

## 2024-04-17 ASSESSMENT — LOCATION SIMPLE DESCRIPTION DERM
LOCATION SIMPLE: LEFT LOWER BACK
LOCATION SIMPLE: LEFT HAND
LOCATION SIMPLE: ABDOMEN
LOCATION SIMPLE: RIGHT UPPER ARM
LOCATION SIMPLE: RIGHT UPPER BACK
LOCATION SIMPLE: RIGHT FOREHEAD

## 2024-04-17 ASSESSMENT — LOCATION DETAILED DESCRIPTION DERM
LOCATION DETAILED: LEFT INFERIOR MEDIAL MIDBACK
LOCATION DETAILED: EPIGASTRIC SKIN
LOCATION DETAILED: RIGHT MEDIAL UPPER BACK
LOCATION DETAILED: RIGHT SUPERIOR UPPER BACK
LOCATION DETAILED: RIGHT FOREHEAD
LOCATION DETAILED: 2ND WEB SPACE LEFT HAND
LOCATION DETAILED: RIGHT PROXIMAL POSTERIOR UPPER ARM

## 2024-04-17 NOTE — PROCEDURE: LIQUID NITROGEN
Application Tool (Optional): Liquid Nitrogen Sprayer
Render Note In Bullet Format When Appropriate: No
Duration Of Freeze Thaw-Cycle (Seconds): 5
Post-Care Instructions: I reviewed with the patient in detail post-care instructions. Patient is to wear sunprotection, and avoid picking at any of the treated lesions. Pt may apply Vaseline to crusted or scabbing areas.
Consent: The patient's consent was obtained including but not limited to risks of crusting, scabbing, blistering, scarring, darker or lighter pigmentary change, recurrence, incomplete removal and infection.
Show Aperture Variable?: Yes
Detail Level: Detailed
Number Of Freeze-Thaw Cycles: 3 freeze-thaw cycles

## 2024-04-19 ENCOUNTER — APPOINTMENT (OUTPATIENT)
Dept: LAB | Facility: HOSPITAL | Age: 74
End: 2024-04-19
Attending: NURSE PRACTITIONER
Payer: MEDICARE

## 2024-04-19 DIAGNOSIS — I71.21 ANEURYSM OF ASCENDING AORTA WITHOUT RUPTURE (CMS/HCC): ICD-10-CM

## 2024-04-19 LAB
BUN SERPL-MCNC: 14 MG/DL (ref 7–25)
CREAT SERPL-MCNC: 0.7 MG/DL (ref 0.6–1.2)
EGFRCR SERPLBLD CKD-EPI 2021: >60 ML/MIN/1.73M*2

## 2024-04-19 PROCEDURE — 82565 ASSAY OF CREATININE: CPT

## 2024-04-19 PROCEDURE — 36415 COLL VENOUS BLD VENIPUNCTURE: CPT

## 2024-04-19 PROCEDURE — 84520 ASSAY OF UREA NITROGEN: CPT

## 2024-04-26 ENCOUNTER — OFFICE VISIT (OUTPATIENT)
Dept: CARDIOTHORACIC SURGERY | Facility: CLINIC | Age: 74
End: 2024-04-26
Payer: MEDICARE

## 2024-04-26 ENCOUNTER — HOSPITAL ENCOUNTER (OUTPATIENT)
Dept: RADIOLOGY | Facility: HOSPITAL | Age: 74
Discharge: HOME | End: 2024-04-26
Attending: NURSE PRACTITIONER
Payer: MEDICARE

## 2024-04-26 VITALS
HEART RATE: 95 BPM | RESPIRATION RATE: 18 BRPM | WEIGHT: 137 LBS | BODY MASS INDEX: 25.86 KG/M2 | OXYGEN SATURATION: 94 % | TEMPERATURE: 97.1 F | HEIGHT: 61 IN | DIASTOLIC BLOOD PRESSURE: 80 MMHG | SYSTOLIC BLOOD PRESSURE: 129 MMHG

## 2024-04-26 DIAGNOSIS — I71.21 ANEURYSM OF ASCENDING AORTA WITHOUT RUPTURE (CMS/HCC): ICD-10-CM

## 2024-04-26 DIAGNOSIS — I71.21 ANEURYSM OF ASCENDING AORTA WITHOUT RUPTURE (CMS/HCC): Primary | ICD-10-CM

## 2024-04-26 PROCEDURE — A9573: HCPCS | Performed by: NURSE PRACTITIONER

## 2024-04-26 PROCEDURE — 99214 OFFICE O/P EST MOD 30 MIN: CPT | Performed by: NURSE PRACTITIONER

## 2024-04-26 PROCEDURE — C8911 MRA W/O FOL W/CONT, CHEST: HCPCS

## 2024-04-26 PROCEDURE — A9579 GAD-BASE MR CONTRAST NOS,1ML: HCPCS | Performed by: NURSE PRACTITIONER

## 2024-04-26 RX ADMIN — GADOPICLENOL 6.3 ML: 485.1 INJECTION INTRAVENOUS at 10:11

## 2024-04-29 ENCOUNTER — TELEPHONE (OUTPATIENT)
Dept: CARDIOTHORACIC SURGERY | Facility: CLINIC | Age: 74
End: 2024-04-29

## 2024-05-22 ENCOUNTER — TRANSCRIBE ORDERS (OUTPATIENT)
Dept: SCHEDULING | Age: 74
End: 2024-05-22

## 2024-05-22 DIAGNOSIS — R13.14 DYSPHAGIA, PHARYNGOESOPHAGEAL PHASE: Primary | ICD-10-CM

## 2024-06-13 ENCOUNTER — HOSPITAL ENCOUNTER (OUTPATIENT)
Dept: RADIOLOGY | Facility: HOSPITAL | Age: 74
Discharge: HOME | End: 2024-06-13
Attending: OTOLARYNGOLOGY
Payer: MEDICARE

## 2024-06-13 DIAGNOSIS — R13.14 DYSPHAGIA, PHARYNGOESOPHAGEAL PHASE: ICD-10-CM

## 2024-06-13 PROCEDURE — 74230 X-RAY XM SWLNG FUNCJ C+: CPT

## 2024-06-13 PROCEDURE — 92611 MOTION FLUOROSCOPY/SWALLOW: CPT | Mod: GN

## 2024-06-13 NOTE — PROCEDURES
06/13/24 Video swallow study was completed. Please refer to the imaging section for full report and recommendations.        SPEECH THERAPIST RECOMMENDATIONS:    1. Continue regular diet and thin liquids  2. Medication via patient preference  3. General aspiration precautions including upright positioning with intake and 30 minutes post, slow rate, small bites and sips, intermittently alternate between solids and liquids  4. ST reviewed results of video swallow study with patient following study. Further ST services do not appear warranted at this time for dysphagia. Could consider outpatient speech therapy targeting vocal function given chronic true, right vocal fold paralysis.    IMPRESSION:    1. Functional oral phase of swallow.  2. Functional pharyngeal phase of swallow. Very transient, shallow laryngeal penetration inconsistently during the swallow with successive straw sips. Suspected functional for patient's age. Otherwise no penetration or aspiration during the study. No significant pharyngeal residuals post swallow despite cervical hardware and complaints of globus sensation.  3. No significant esophageal impairments within limitations of this study.      Sintia GUZMAN/NEISHA was present for this examination.   The undersigned radiologist agrees only with the radiographic findings.  Specific swallowing recommendations are solely the purview of the Speech Pathology Division.   Dr. Pancho Crocker M.S., CCC-SLP/L

## 2024-09-16 ENCOUNTER — APPOINTMENT (OUTPATIENT)
Dept: LAB | Facility: HOSPITAL | Age: 74
End: 2024-09-16
Attending: INTERNAL MEDICINE
Payer: MEDICARE

## 2024-09-16 DIAGNOSIS — G45.9 TIA (TRANSIENT ISCHEMIC ATTACK): ICD-10-CM

## 2024-09-16 DIAGNOSIS — E78.00 HYPERCHOLESTEROLEMIA: ICD-10-CM

## 2024-09-16 LAB
CHOLEST SERPL-MCNC: 159 MG/DL
HDLC SERPL-MCNC: 67 MG/DL
HDLC SERPL: 2.4 {RATIO}
LDLC SERPL CALC-MCNC: 81 MG/DL
NONHDLC SERPL-MCNC: 92 MG/DL
TRIGL SERPL-MCNC: 53 MG/DL

## 2024-09-16 PROCEDURE — 36415 COLL VENOUS BLD VENIPUNCTURE: CPT

## 2024-09-16 PROCEDURE — 80061 LIPID PANEL: CPT

## 2024-09-18 NOTE — PROGRESS NOTES
Albert Dale MD  Cardiology    [facility]    [facility]  [address]  [address] [place] [address]    TEL  [phone]  [facility]     09/19/24    Dear Dr. Madden:    It was my pleasure to see Yisel Lyle at the [facility] today for follow-up.    As you know, she is a 74 y.o. female with a history of vocal cord paralysis, laryngeal spasm and dyspnea, obstructive sleep apnea on CPAP therapy, TIA in November 2019, coronary calcifications, and a mildly enlarged ascending aorta.    Yisel had a difficult summer with respiratory infections.  She required steroids twice.  She has follow-up with her pulmonologist in the near future.  Fortunately her breathing is improving overall.  No chest pain or pressure.  She walks regularly for exercise without any cardiopulmonary limitation.  No palpitations or syncope.  No lower extremity edema, orthopnea or PND.  She is compliant with her medications.  She reports that her diet was suboptimal over the summer.    Past Medical History:  Past Medical History:   Diagnosis Date    Abnormal ECG     Ascending aorta enlargement (CMS/HCC) 7/17/2019    The ascending aorta is mildly enlarged measuring approximately 4 cm diameter.  (6/6/2019)    Asthma     Coronary artery calcification seen on CT scan 7/17/2019    Mild coronary artery calcifications on CT of chest (6/6/2019)    GERD (gastroesophageal reflux disease)     Obstructive sleep apnea syndrome 1/15/2020    Opacity of lung on imaging study 7/17/2019    8mm subsolid opacity in the posterior right upper lobe, which may be inflammatory in nature (noted on CT of chest 6/6/2019)    Osteopenia 7/23/2020    Shortness of breath 7/17/2019    Stroke (CMS/HCC)     Vocal cord paralysis     compliacation of anterior cervical discectomy with fusion     Past Surgical History:  Past Surgical History   Procedure Laterality Date    Anterior cervical discectomy  w/ fusion  10/2018    Cataract extraction w/  intraocular lens implant Left 2017    Colonoscopy w/ polypectomy      colonoscopies every 3 years    EGD TRANSORAL BIOPSY SINGLE/MULTIPLE [61834 (CPT®)] N/A 12/19/2019    Performed by Cong Major MD at [facility]    Eye surgery Left 2017    fror replacement of wrong lens from cataract surgery 2 weeks prior    Reduction mammaplasty Bilateral 1988    Skin biopsy      Tonsillectomy  1976    Vocal cord injection Right 03/2019    for paralysis after acdf     Medications:  Current Outpatient Medications   Medication Sig Dispense Refill    albuterol HFA (VENTOLIN HFA) 90 mcg/actuation inhaler Inhale 2 puffs daily.      ascorbic acid (VITAMIN C ORAL) Take 250 mg by mouth daily.      aspirin 81 mg enteric coated tablet Take 81 mg by mouth daily.      cholecalciferol, vitamin D3, 50 mcg (2,000 unit) capsule Take 2,000 Units by mouth daily. 2 capsules 4000 IU daily       clindamycin (CLEOCIN T) 1 % external solution Apply topically as needed.      estradioL (ESTRACE) 0.01 % (0.1 mg/gram) vaginal cream as needed.      famotidine (PEPCID) 20 mg tablet Take 20 mg by mouth daily.      fexofenadine (ALLEGRA) 180 mg tablet Take 180 mg by mouth daily.        fluticasone propionate (FLONASE) 50 mcg/actuation nasal spray Administer 2 sprays into each nostril daily.      montelukast (SINGULAIR) 10 mg tablet Take 10 mg by mouth nightly.      NOT IN DATABASE Pt use an additional inhale as needed. Name unknown to pt.      PULMICORT FLEXHALER 180 mcg/actuation inhaler INHALE TWO PUFFS BY MOUTH EVERY DAY - RINSE MOUTH AFTER USE      rosuvastatin (CRESTOR) 20 mg tablet TAKE ONE TABLET BY MOUTH EVERY DAY 90 tablet 3    methenamine (HIPREX) 1 gram tablet Take 1 g by mouth daily.       No current facility-administered medications for this visit.       Allergies: Iodine and iodide containing products, Venom-honey bee, and Anesthesia s/i-40 (propofol) [propofol]    Social History: She is a retired  .  She has 2 children.  She is a never smoker.  Rare alcohol use.  No recreational drugs.    Family History: All 5 maternal uncles had coronary disease.  No family history of premature coronary artery disease, arrhythmias, cardiomyopathies, or sudden cardiac death.    Review of Systems: A complete 14-point review of systems is negative, except as noted in the HPI.    Exam:  Objective   Vitals:    09/19/24 1004   BP: 128/82   Pulse: 64   SpO2: 95%     Body mass index is 25.89 kg/m².  Constitutional: Appears comfortable.  Eyes: No icterus.   ENT: Deferred.  Neck: No jugular venous distention.   Vascular: No carotid bruits.   Cardiac: Normal S1 and S2, regular rhythm.  1/6 murmur at the base.  No rubs or gallops appreciated.  Lungs: Clear to auscultation bilaterally.   GI: Nondistended.  Extremities: Warm. No lower extremity edema.   Skin: Dry.   Neurologic: Awake, alert, oriented.    Psychiatric: No agitation.    Wt Readings from Last 3 Encounters:   09/19/24 62.1 kg (137 lb)   04/26/24 62.1 kg (137 lb)   04/26/24 63 kg (139 lb)     Labs: Personally reviewed and discussed with the patient.  Notable for the following.   Lab Results   Component Value Date    LDLCALC 81 09/16/2024    CHOL 159 09/16/2024    TRIG 53 09/16/2024    HDL 67 09/16/2024     01/05/2024    K 4.7 01/05/2024    BUN 14 04/19/2024    CREATININE 0.7 04/19/2024    WBC 5.77 06/18/2019    HGB 14.0 06/18/2019     06/18/2019     Lab Results   Component Value Date    ALT 21 01/05/2024    ALT 20 01/05/2024    AST 24 01/05/2024    AST 22 01/05/2024    ALKPHOS 50 01/05/2024    ALKPHOS 50 01/05/2024    BILITOT 0.8 01/05/2024    BILITOT 0.8 01/05/2024     Cardiovascular Studies:   1.  Stress echocardiogram, 10/2018: Negative for ischemia.  Fair-good exercise tolerance. Rest echo-normal LV wall thickness, LVEF 65%, normal RV systolic function, no significant valvular disease noted.  Ascending aorta 3.8 cm.  No mention of the aortic  valve.  2.  CT chest, 6/2019: Ascending aorta 4 cm  3.  TTE, 2/21: Normal LV size, focal basal septal hypertrophy, LVEF 65%, no WMAs. Normal RV size/function. Tricuspid aortic valve. Ascending aorta 4 cm.   4.  Chest MRI (no contrast), 4/2022: Ascending aorta 39 mm.  5.  Chest MRA, 3/2023: Ascending aorta 38 mm.  6.  TTE, 6/2023: Normal LV size, focal basal septal hypertrophy, LVEF 65%. No WMAs. Normal RV size/function. Tricuspid AV. No significant valve disease. Ascending aorta 39mm. No pericardial effusion.   7.  Chest MRA, 4/2024: Ascending aorta 38 mm.    ECG from today personally reviewed and discussed with the patient shows sinus bradycardia with first-degree AV block.       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  Dr. Khalil previously suspected additional nerve damage may have developed resulting in dysfunction of her upper airway musculature and producing her exertional dyspnea.  She has no signs of heart failure.  We will continue to monitor clinically.  She knows to contact me should her symptoms progress in any way.     Coronary calcification incidentally noted on CT scan 6/19:  No anginal symptoms.  She had a negative stress test for ischemia in October 2018.  She will remain on aspirin and lipid-lowering therapy.     Hypercholesterolemia:  Her LDL increased on her most recent labs.  She will renew efforts at therapeutic lifestyle change and we will repeat a lipid profile in a few months.  Given her history of TIA, her LDL goal is less than 70 mg/dL.  If she is above this goal we will consider ezetimibe.  This medication was introduced today.    Mildly dilated ascending aorta:  She continues to follow with the aortic center. Her echocardiogram showed a tricuspid aortic valve.  Blood pressure controlled.     History of TIA/small vessel  ischemia:  Blood pressure controlled.  LDL management as above.  She is now following with neurology.  She remains on aspirin.      It was my pleasure to visit with Yisel in clinic today.  She will follow up with me in 6 months.  Please do not hesitate to contact me with any questions.      Sincerely,       ________________  Albert Dale MD

## 2024-09-19 ENCOUNTER — OFFICE VISIT (OUTPATIENT)
Dept: CARDIOLOGY | Facility: CLINIC | Age: 74
End: 2024-09-19
Payer: MEDICARE

## 2024-09-19 VITALS
DIASTOLIC BLOOD PRESSURE: 82 MMHG | WEIGHT: 137 LBS | HEIGHT: 61 IN | HEART RATE: 64 BPM | SYSTOLIC BLOOD PRESSURE: 128 MMHG | OXYGEN SATURATION: 95 % | BODY MASS INDEX: 25.86 KG/M2

## 2024-09-19 DIAGNOSIS — I73.9 SMALL VESSEL DISEASE (CMS/HCC): ICD-10-CM

## 2024-09-19 DIAGNOSIS — I77.89 ASCENDING AORTA ENLARGEMENT (CMS/HCC): ICD-10-CM

## 2024-09-19 DIAGNOSIS — G45.9 TIA (TRANSIENT ISCHEMIC ATTACK): ICD-10-CM

## 2024-09-19 DIAGNOSIS — E78.00 HYPERCHOLESTEROLEMIA: ICD-10-CM

## 2024-09-19 DIAGNOSIS — I25.10 CORONARY ARTERY CALCIFICATION SEEN ON CT SCAN: Primary | ICD-10-CM

## 2024-09-19 DIAGNOSIS — R06.02 SHORTNESS OF BREATH: ICD-10-CM

## 2024-09-19 LAB
ATRIAL RATE: 53
P AXIS: 50
PR INTERVAL: 218
QRS DURATION: 76
QT INTERVAL: 430
QTC CALCULATION(BAZETT): 403
R AXIS: -28
T WAVE AXIS: 0
VENTRICULAR RATE: 53

## 2024-09-19 PROCEDURE — 99214 OFFICE O/P EST MOD 30 MIN: CPT | Performed by: INTERNAL MEDICINE

## 2024-09-19 PROCEDURE — 93000 ELECTROCARDIOGRAM COMPLETE: CPT | Performed by: INTERNAL MEDICINE

## 2024-09-19 RX ORDER — MONTELUKAST SODIUM 10 MG/1
10 TABLET ORAL NIGHTLY
COMMUNITY

## 2024-10-18 ENCOUNTER — APPOINTMENT (OUTPATIENT)
Age: 74
Setting detail: DERMATOLOGY
End: 2024-10-18

## 2024-10-18 DIAGNOSIS — D18.0 HEMANGIOMA: ICD-10-CM

## 2024-10-18 DIAGNOSIS — L81.4 OTHER MELANIN HYPERPIGMENTATION: ICD-10-CM

## 2024-10-18 DIAGNOSIS — L57.8 OTHER SKIN CHANGES DUE TO CHRONIC EXPOSURE TO NONIONIZING RADIATION: ICD-10-CM

## 2024-10-18 DIAGNOSIS — D22 MELANOCYTIC NEVI: ICD-10-CM

## 2024-10-18 DIAGNOSIS — L82.1 OTHER SEBORRHEIC KERATOSIS: ICD-10-CM

## 2024-10-18 PROBLEM — D22.61 MELANOCYTIC NEVI OF RIGHT UPPER LIMB, INCLUDING SHOULDER: Status: ACTIVE | Noted: 2024-10-18

## 2024-10-18 PROBLEM — D18.01 HEMANGIOMA OF SKIN AND SUBCUTANEOUS TISSUE: Status: ACTIVE | Noted: 2024-10-18

## 2024-10-18 PROCEDURE — OTHER COUNSELING: OTHER

## 2024-10-18 PROCEDURE — 99213 OFFICE O/P EST LOW 20 MIN: CPT

## 2024-10-18 PROCEDURE — OTHER MIPS QUALITY: OTHER

## 2024-10-18 PROCEDURE — OTHER SUNSCREEN RECOMMENDATIONS: OTHER

## 2024-10-18 ASSESSMENT — LOCATION SIMPLE DESCRIPTION DERM
LOCATION SIMPLE: RIGHT UPPER BACK
LOCATION SIMPLE: RIGHT UPPER ARM
LOCATION SIMPLE: ABDOMEN
LOCATION SIMPLE: LEFT LOWER BACK

## 2024-10-18 ASSESSMENT — LOCATION DETAILED DESCRIPTION DERM
LOCATION DETAILED: RIGHT SUPERIOR UPPER BACK
LOCATION DETAILED: RIGHT MEDIAL UPPER BACK
LOCATION DETAILED: EPIGASTRIC SKIN
LOCATION DETAILED: LEFT INFERIOR MEDIAL MIDBACK
LOCATION DETAILED: RIGHT PROXIMAL POSTERIOR UPPER ARM

## 2024-10-18 ASSESSMENT — LOCATION ZONE DERM
LOCATION ZONE: TRUNK
LOCATION ZONE: ARM

## 2024-10-18 NOTE — HPI: FULL BODY SKIN EXAMINATION
What Is The Reason For Today's Visit?: Full Body Skin Examination
What Is The Reason For Today's Visit? (Being Monitored For X): concerning skin lesions on a periodic basis
Additional History: Patient presents for skin exam.  History of melanoma and scc.  Check lesion on back - raised and itchy lesion.

## 2024-12-11 ENCOUNTER — TELEPHONE (OUTPATIENT)
Dept: SCHEDULING | Facility: CLINIC | Age: 74
End: 2024-12-11
Payer: MEDICARE

## 2024-12-11 NOTE — TELEPHONE ENCOUNTER
Pt called states she wants Lab order for labs ordered by Dr Dale mailed to home address file as Pt wants to have labs done at at lab near Pt home    Pt can be reached at: [phone]

## 2025-01-02 DIAGNOSIS — I25.10 CORONARY ARTERY CALCIFICATION SEEN ON CT SCAN: ICD-10-CM

## 2025-01-02 RX ORDER — ROSUVASTATIN CALCIUM 20 MG/1
20 TABLET, COATED ORAL DAILY
Qty: 90 TABLET | Refills: 3 | Status: SHIPPED | OUTPATIENT
Start: 2025-01-02

## 2025-01-02 NOTE — TELEPHONE ENCOUNTER
Medicine Refill Request [facility]    Name of Patient: Yisel Lyle    Caller's name/Relationship: self     Callback number: [phone]    Medication Name, Dosage, Supply: rosuvastatin (CRESTOR) 20 mg tablet     Quantity left: few    Pharmacy: [facility]    Last Office Visit: 9.19  Last Consult Visit: Visit date not found  Last Telemedicine Visit: Visit date not found    Next Appointment: Visit date not found      Current Outpatient Medications:     albuterol HFA (VENTOLIN HFA) 90 mcg/actuation inhaler, Inhale 2 puffs daily., Disp: , Rfl:     ascorbic acid (VITAMIN C ORAL), Take 250 mg by mouth daily., Disp: , Rfl:     aspirin 81 mg enteric coated tablet, Take 81 mg by mouth daily., Disp: , Rfl:     cholecalciferol, vitamin D3, 50 mcg (2,000 unit) capsule, Take 2,000 Units by mouth daily. 2 capsules 4000 IU daily , Disp: , Rfl:     clindamycin (CLEOCIN T) 1 % external solution, Apply topically as needed., Disp: , Rfl:     estradioL (ESTRACE) 0.01 % (0.1 mg/gram) vaginal cream, as needed., Disp: , Rfl:     famotidine (PEPCID) 20 mg tablet, Take 20 mg by mouth daily., Disp: , Rfl:     fexofenadine (ALLEGRA) 180 mg tablet, Take 180 mg by mouth daily.  , Disp: , Rfl:     fluticasone propionate (FLONASE) 50 mcg/actuation nasal spray, Administer 2 sprays into each nostril daily., Disp: , Rfl:     methenamine (HIPREX) 1 gram tablet, Take 1 g by mouth daily., Disp: , Rfl:     montelukast (SINGULAIR) 10 mg tablet, Take 10 mg by mouth nightly., Disp: , Rfl:     NOT IN DATABASE, Pt use an additional inhale as needed. Name unknown to pt., Disp: , Rfl:     PULMICORT FLEXHALER 180 mcg/actuation inhaler, INHALE TWO PUFFS BY MOUTH EVERY DAY - RINSE MOUTH AFTER USE, Disp: , Rfl:     rosuvastatin (CRESTOR) 20 mg tablet, TAKE ONE TABLET BY MOUTH EVERY DAY, Disp: 90 tablet, Rfl: 3      BP Readings from Last 3 Encounters:   09/19/24 128/82   04/26/24 129/80   03/15/24 120/78       Recent Lab results:  Lab Results   Component Value Date    CHOL  "159 09/16/2024   ,   Lab Results   Component Value Date    HDL 67 09/16/2024   ,   Lab Results   Component Value Date    LDLCALC 81 09/16/2024   ,   Lab Results   Component Value Date    TRIG 53 09/16/2024        Lab Results   Component Value Date    GLUCOSE 97 01/05/2024   , No results found for: \"HGBA1C\"      Lab Results   Component Value Date    CREATININE 0.7 04/19/2024       Lab Results   Component Value Date    TSH 2.68 01/05/2024           "

## 2025-01-28 LAB
CHOLEST SERPL-MCNC: 173 MG/DL (ref 100–199)
HDLC SERPL-MCNC: 91 MG/DL
LDLC SERPL CALC-MCNC: 71 MG/DL (ref 0–99)
SPECIMEN STATUS: NORMAL
TRIGL SERPL-MCNC: 56 MG/DL (ref 0–149)
VLDLC SERPL CALC-MCNC: 11 MG/DL (ref 5–40)

## 2025-02-04 ENCOUNTER — TELEPHONE (OUTPATIENT)
Dept: CARDIOLOGY | Facility: CLINIC | Age: 75
End: 2025-02-04
Payer: MEDICARE

## 2025-02-04 DIAGNOSIS — E78.00 HYPERCHOLESTEROLEMIA: Primary | ICD-10-CM

## 2025-02-04 NOTE — TELEPHONE ENCOUNTER
Called and spoke with pt. We discussed Dr. Dale's review of her labs and recommendations as noted below. She prefers to work on diet and exercise and repeat her labs prior to starting the new medication.    DG: Pt requesting lab slip be mailed to her home address on file. Can you please mail? Thank you!      ----- Message from Albert Dale sent at 2/4/2025  8:41 AM EST -----  Please let patient know that her LDL has improved compared with labs in September.  They remain higher than I would ideally like long-term given her TIA history.  If she believes there is further room to improve her lifestyle we can continue her current pharmacotherapy for now and repeat a lipid profile before her follow-up visit.  If she feels that her lifestyle habits have been optimized I would suggest adding ezetimibe 10 mg daily.  Please review potential side effects of the medication if she would like to proceed.  Thank you.

## 2025-03-11 LAB
CHOLEST SERPL-MCNC: 169 MG/DL (ref 100–199)
HDLC SERPL-MCNC: 80 MG/DL
LDLC SERPL CALC-MCNC: 73 MG/DL (ref 0–99)
SPECIMEN STATUS: NORMAL
TRIGL SERPL-MCNC: 90 MG/DL (ref 0–149)
VLDLC SERPL CALC-MCNC: 16 MG/DL (ref 5–40)

## 2025-03-18 ENCOUNTER — OFFICE VISIT (OUTPATIENT)
Dept: CARDIOLOGY | Facility: CLINIC | Age: 75
End: 2025-03-18
Payer: MEDICARE

## 2025-03-18 VITALS
SYSTOLIC BLOOD PRESSURE: 118 MMHG | DIASTOLIC BLOOD PRESSURE: 74 MMHG | HEIGHT: 61 IN | WEIGHT: 137 LBS | BODY MASS INDEX: 25.86 KG/M2 | HEART RATE: 70 BPM | OXYGEN SATURATION: 97 %

## 2025-03-18 DIAGNOSIS — I73.9 SMALL VESSEL DISEASE (CMS/HCC): ICD-10-CM

## 2025-03-18 DIAGNOSIS — J38.00 VOCAL CORD PARALYSIS: ICD-10-CM

## 2025-03-18 DIAGNOSIS — I25.10 CORONARY ARTERY CALCIFICATION SEEN ON CT SCAN: Primary | ICD-10-CM

## 2025-03-18 DIAGNOSIS — E78.00 HYPERCHOLESTEROLEMIA: ICD-10-CM

## 2025-03-18 DIAGNOSIS — G45.9 TIA (TRANSIENT ISCHEMIC ATTACK): ICD-10-CM

## 2025-03-18 LAB
ATRIAL RATE: 55
P AXIS: 50
PR INTERVAL: 196
QRS DURATION: 76
QT INTERVAL: 438
QTC CALCULATION(BAZETT): 419
R AXIS: -32
T WAVE AXIS: -12
VENTRICULAR RATE: 55

## 2025-03-18 PROCEDURE — 93000 ELECTROCARDIOGRAM COMPLETE: CPT | Performed by: INTERNAL MEDICINE

## 2025-03-18 PROCEDURE — 99214 OFFICE O/P EST MOD 30 MIN: CPT | Mod: GC | Performed by: INTERNAL MEDICINE

## 2025-03-18 RX ORDER — EZETIMIBE 10 MG/1
10 TABLET ORAL NIGHTLY
Qty: 90 TABLET | Refills: 1 | Status: CANCELLED | OUTPATIENT
Start: 2025-03-18 | End: 2025-09-14

## 2025-03-18 RX ORDER — EZETIMIBE 10 MG/1
10 TABLET ORAL DAILY
Qty: 90 TABLET | Refills: 1 | Status: SHIPPED | OUTPATIENT
Start: 2025-03-18 | End: 2025-09-14

## 2025-03-18 NOTE — PROGRESS NOTES
Albert Dale MD  Cardiology    [facility]    [facility]  [address]  [address] [place] [address]    TEL  [phone]  [facility]     03/18/25    Dear Dr. Canales:    It was my pleasure to see Yisel Lyle at the [facility] today for follow-up.    As you know, she is a 74 y.o. female with a history of vocal cord paralysis, laryngeal spasm and dyspnea, obstructive sleep apnea on CPAP therapy, TIA in November 2019, coronary calcifications, and a mildly enlarged ascending aorta.    Feeling well overall. No chest pain or pressure. No palpitations or syncope.  No lower extremity edema, orthopnea or PND.  She is compliant with her medications without any adverse effects.     She walks regularly for exercise without any cardiopulmonary limitation. Aims for 10K steps per day (but will at least get 5-7K), if weather is bad goes to gym or walks outside. Maintains an overall healthy diet, avoids added sugars and sweets.     Past Medical History:  Past Medical History:   Diagnosis Date    Abnormal ECG     Ascending aorta enlargement (CMS/HCC) 7/17/2019    The ascending aorta is mildly enlarged measuring approximately 4 cm diameter.  (6/6/2019)    Asthma     Coronary artery calcification seen on CT scan 7/17/2019    Mild coronary artery calcifications on CT of chest (6/6/2019)    GERD (gastroesophageal reflux disease)     Obstructive sleep apnea syndrome 1/15/2020    Opacity of lung on imaging study 7/17/2019    8mm subsolid opacity in the posterior right upper lobe, which may be inflammatory in nature (noted on CT of chest 6/6/2019)    Osteopenia 7/23/2020    Shortness of breath 7/17/2019    Stroke (CMS/HCC)     Vocal cord paralysis     compliacation of anterior cervical discectomy with fusion     Past Surgical History:  Past Surgical History   Procedure Laterality Date    Anterior cervical discectomy w/ fusion  10/2018    Cataract  extraction w/  intraocular lens implant Left 2017    Colonoscopy w/ polypectomy      colonoscopies every 3 years    EGD TRANSORAL BIOPSY SINGLE/MULTIPLE [83384 (CPT®)] N/A 12/19/2019    Performed by Cong Major MD at [facility]    Eye surgery Left 2017    fror replacement of wrong lens from cataract surgery 2 weeks prior    Reduction mammaplasty Bilateral 1988    Skin biopsy      Tonsillectomy  1976    Vocal cord injection Right 03/2019    for paralysis after acdf     Medications:  Current Outpatient Medications   Medication Sig Dispense Refill    albuterol HFA (VENTOLIN HFA) 90 mcg/actuation inhaler Inhale 2 puffs daily.      ascorbic acid (VITAMIN C ORAL) Take 250 mg by mouth daily.      aspirin 81 mg enteric coated tablet Take 81 mg by mouth daily.      cholecalciferol, vitamin D3, 50 mcg (2,000 unit) capsule Take 2,000 Units by mouth daily. 2 capsules 4000 IU daily       clindamycin (CLEOCIN T) 1 % external solution Apply topically as needed.      estradioL (ESTRACE) 0.01 % (0.1 mg/gram) vaginal cream as needed.      ezetimibe (ZETIA) 10 mg tablet Take 1 tablet (10 mg total) by mouth daily. 90 tablet 1    famotidine (PEPCID) 20 mg tablet Take 20 mg by mouth daily.      fexofenadine (ALLEGRA) 180 mg tablet Take 180 mg by mouth daily.        fluticasone propionate (FLONASE) 50 mcg/actuation nasal spray Administer 2 sprays into each nostril daily.      montelukast (SINGULAIR) 10 mg tablet Take 10 mg by mouth nightly.      NOT IN DATABASE Pt use an additional inhale as needed. Name unknown to pt.      PULMICORT FLEXHALER 180 mcg/actuation inhaler INHALE TWO PUFFS BY MOUTH EVERY DAY - RINSE MOUTH AFTER USE      rosuvastatin (CRESTOR) 20 mg tablet Take 1 tablet (20 mg total) by mouth daily. 90 tablet 3    methenamine (HIPREX) 1 gram tablet Take 1 g by mouth daily.       No current facility-administered medications for this visit.       Allergies: Iodine and iodide containing products, Venom-honey bee, and Anesthesia  s/i-40 (propofol) [propofol]    Social History: She is a retired .  She has 2 children.  She is a never smoker.  Rare alcohol use.  No recreational drugs.    Family History: All 5 maternal uncles had coronary disease.  No family history of premature coronary artery disease, arrhythmias, cardiomyopathies, or sudden cardiac death.    Review of Systems: A complete 14-point review of systems is negative, except as noted in the HPI.    Exam:  Objective   Vitals:    03/18/25 1028   BP: 118/74   Pulse: 70   SpO2: 97%     Body mass index is 25.89 kg/m².  Constitutional: Appears comfortable.  Eyes: No icterus.   ENT: Deferred.  Neck: No jugular venous distention.   Vascular: No carotid bruits.   Cardiac: Normal S1 and S2, regular rhythm.  1/6 murmur at the base.  No rubs or gallops appreciated.  Lungs: Clear to auscultation bilaterally.   GI: Nondistended.  Extremities: Warm. No lower extremity edema.   Skin: Dry.   Neurologic: Awake, alert, oriented.    Psychiatric: No agitation.    Wt Readings from Last 3 Encounters:   03/18/25 62.1 kg (137 lb)   09/19/24 62.1 kg (137 lb)   04/26/24 62.1 kg (137 lb)     Labs: Personally reviewed and discussed with the patient.  Notable for the following.   Lab Results   Component Value Date    LDLCALC 73 03/10/2025    LDLCALC 81 09/16/2024    CHOL 169 03/10/2025    CHOL 159 09/16/2024    TRIG 90 03/10/2025    TRIG 53 09/16/2024    HDL 80 03/10/2025    HDL 67 09/16/2024     01/05/2024    K 4.7 01/05/2024    BUN 14 04/19/2024    CREATININE 0.7 04/19/2024    WBC 5.77 06/18/2019    HGB 14.0 06/18/2019     06/18/2019     Lab Results   Component Value Date    ALT 21 01/05/2024    ALT 20 01/05/2024    AST 24 01/05/2024    AST 22 01/05/2024    ALKPHOS 50 01/05/2024    ALKPHOS 50 01/05/2024    BILITOT 0.8 01/05/2024    BILITOT 0.8 01/05/2024     Cardiovascular Studies:   1.  Stress echocardiogram, 10/2018: Negative for ischemia.  Fair-good exercise tolerance. Rest  echo-normal LV wall thickness, LVEF 65%, normal RV systolic function, no significant valvular disease noted.  Ascending aorta 3.8 cm.  No mention of the aortic valve.  2.  CT chest, 6/2019: Ascending aorta 4 cm  3.  TTE, 2/21: Normal LV size, focal basal septal hypertrophy, LVEF 65%, no WMAs. Normal RV size/function. Tricuspid aortic valve. Ascending aorta 4 cm.   4.  Chest MRI (no contrast), 4/2022: Ascending aorta 39 mm.  5.  Chest MRA, 3/2023: Ascending aorta 38 mm.  6.  TTE, 6/2023: Normal LV size, focal basal septal hypertrophy, LVEF 65%. No WMAs. Normal RV size/function. Tricuspid AV. No significant valve disease. Ascending aorta 39mm. No pericardial effusion.   7.  Chest MRA, 4/2024: Ascending aorta 38 mm.    ECG from today personally reviewed and discussed with the patient shows sinus bradycardia with left axis.      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  Dr. Khalil previously suspected additional nerve damage may have developed resulting in dysfunction of her upper airway musculature and producing her exertional dyspnea.  She has no signs of heart failure.  We will continue to monitor clinically.  She knows to contact me should her symptoms progress in any way.     Coronary calcification incidentally noted on CT scan 6/19:  No anginal symptoms.  She had a negative stress test for ischemia in October 2018.  She will remain on aspirin and lipid-lowering therapy.     Hypercholesterolemia:  Her LDL has remained > 70 mg/dL on most recent labs. This was after optimizing lifestyle.  Given her history of TIA, her LDL goal is less than 70 mg/dL. Will start ezetimibe 10 mg daily.  Potential side effects reviewed.  Follow up labs in 6 weeks.    Mildly dilated ascending aorta:  She continues to follow with the aortic center. Her echocardiogram showed a  tricuspid aortic valve.  Blood pressure controlled.     History of TIA/small vessel ischemia:  Blood pressure well-controlled (not on any medications).  LDL management as above.  She is now following with neurology.  She remains on aspirin.      It was my pleasure to visit with Yisel in clinic today.  She will follow up with me in 6 months.  Please do not hesitate to contact me with any questions.      Sincerely,       ________________  Albert Dale MD    Patient seen with cardiology fellow, Dr. Dwyer, who assisted with the above note.

## 2025-04-30 ENCOUNTER — APPOINTMENT (OUTPATIENT)
Age: 75
Setting detail: DERMATOLOGY
End: 2025-04-30

## 2025-04-30 DIAGNOSIS — L57.0 ACTINIC KERATOSIS: ICD-10-CM

## 2025-04-30 DIAGNOSIS — L82.1 OTHER SEBORRHEIC KERATOSIS: ICD-10-CM

## 2025-04-30 DIAGNOSIS — D49.2 NEOPLASM OF UNSPECIFIED BEHAVIOR OF BONE, SOFT TISSUE, AND SKIN: ICD-10-CM

## 2025-04-30 DIAGNOSIS — D22 MELANOCYTIC NEVI: ICD-10-CM

## 2025-04-30 DIAGNOSIS — L81.4 OTHER MELANIN HYPERPIGMENTATION: ICD-10-CM

## 2025-04-30 DIAGNOSIS — L57.8 OTHER SKIN CHANGES DUE TO CHRONIC EXPOSURE TO NONIONIZING RADIATION: ICD-10-CM

## 2025-04-30 DIAGNOSIS — D18.0 HEMANGIOMA: ICD-10-CM

## 2025-04-30 PROBLEM — D18.01 HEMANGIOMA OF SKIN AND SUBCUTANEOUS TISSUE: Status: ACTIVE | Noted: 2025-04-30

## 2025-04-30 PROBLEM — D22.61 MELANOCYTIC NEVI OF RIGHT UPPER LIMB, INCLUDING SHOULDER: Status: ACTIVE | Noted: 2025-04-30

## 2025-04-30 PROCEDURE — 17000 DESTRUCT PREMALG LESION: CPT | Mod: 59

## 2025-04-30 PROCEDURE — 11102 TANGNTL BX SKIN SINGLE LES: CPT

## 2025-04-30 PROCEDURE — OTHER LIQUID NITROGEN: OTHER

## 2025-04-30 PROCEDURE — OTHER MIPS QUALITY: OTHER

## 2025-04-30 PROCEDURE — 99213 OFFICE O/P EST LOW 20 MIN: CPT | Mod: 25

## 2025-04-30 PROCEDURE — OTHER BIOPSY BY SHAVE METHOD: OTHER

## 2025-04-30 PROCEDURE — OTHER COUNSELING: OTHER

## 2025-04-30 PROCEDURE — OTHER SUNSCREEN RECOMMENDATIONS: OTHER

## 2025-04-30 ASSESSMENT — LOCATION SIMPLE DESCRIPTION DERM
LOCATION SIMPLE: CHEST
LOCATION SIMPLE: RIGHT UPPER ARM
LOCATION SIMPLE: RIGHT SHOULDER
LOCATION SIMPLE: RIGHT UPPER BACK
LOCATION SIMPLE: ABDOMEN
LOCATION SIMPLE: LEFT LOWER BACK

## 2025-04-30 ASSESSMENT — LOCATION DETAILED DESCRIPTION DERM
LOCATION DETAILED: MIDDLE STERNUM
LOCATION DETAILED: RIGHT MEDIAL UPPER BACK
LOCATION DETAILED: RIGHT ANTERIOR SHOULDER
LOCATION DETAILED: RIGHT PROXIMAL POSTERIOR UPPER ARM
LOCATION DETAILED: RIGHT SUPERIOR UPPER BACK
LOCATION DETAILED: LEFT INFERIOR MEDIAL MIDBACK
LOCATION DETAILED: EPIGASTRIC SKIN
LOCATION DETAILED: STERNAL NOTCH

## 2025-04-30 ASSESSMENT — LOCATION ZONE DERM
LOCATION ZONE: ARM
LOCATION ZONE: TRUNK

## 2025-04-30 NOTE — HPI: FULL BODY SKIN EXAMINATION
What Is The Reason For Today's Visit?: Full Body Skin Examination
What Is The Reason For Today's Visit? (Being Monitored For X): concerning skin lesions on a periodic basis
Additional History: Patient presents for skin exam and has and states her right arm is very sore from the MOHs surgery she had a few years ago. She also has spot on her chest she would like you to look at.

## 2025-04-30 NOTE — PROCEDURE: BIOPSY BY SHAVE METHOD
Detail Level: Detailed
Depth Of Biopsy: dermis
Was A Bandage Applied: Yes
Size Of Lesion In Cm: 0
Biopsy Type: H and E
Biopsy Method: Personna blade
Anesthesia Type: 1% lidocaine without epinephrine
Anesthesia Volume In Cc: 0.5
Hemostasis: Drysol
Wound Care: Petrolatum
Dressing: bandage
Destruction After The Procedure: No
Type Of Destruction Used: Curettage
Curettage Text: The wound bed was treated with curettage after the biopsy was performed.
Cryotherapy Text: The wound bed was treated with cryotherapy after the biopsy was performed.
Electrodesiccation Text: The wound bed was treated with electrodesiccation after the biopsy was performed.
Electrodesiccation And Curettage Text: The wound bed was treated with electrodesiccation and curettage after the biopsy was performed.
Silver Nitrate Text: The wound bed was treated with silver nitrate after the biopsy was performed.
Lab: [id number]
Medical Necessity Information: It is in your best interest to select a reason for this procedure from the list below. All of these items fulfill various CMS LCD requirements except the new and changing color options.
Consent: Written consent was obtained and risks were reviewed including but not limited to scarring, infection, bleeding, scabbing, incomplete removal, nerve damage and allergy to anesthesia.
Post-Care Instructions: I reviewed with the patient in detail post-care instructions. Patient is to keep the biopsy site dry overnight. Cleans daily with soap and water. Apply Aquaphor once or twice daily and a bandage for 3-5 days.
Notification Instructions: Patient will be notified of biopsy results. However, patient instructed to call the office if not contacted within 2 weeks.
Billing Type: Third-Party Bill
Information: Selecting Yes will display possible errors in your note based on the variables you have selected. This validation is only offered as a suggestion for you. PLEASE NOTE THAT THE VALIDATION TEXT WILL BE REMOVED WHEN YOU FINALIZE YOUR NOTE. IF YOU WANT TO FAX A PRELIMINARY NOTE YOU WILL NEED TO TOGGLE THIS TO 'NO' IF YOU DO NOT WANT IT IN YOUR FAXED NOTE.

## 2025-04-30 NOTE — PROCEDURE: LIQUID NITROGEN
Post-Care Instructions: I reviewed with the patient in detail post-care instructions. Patient is to wear sunprotection, and avoid picking at any of the treated lesions. Pt may apply Vaseline to crusted or scabbing areas.
Show Applicator Variable?: Yes
Application Tool (Optional): Liquid Nitrogen Sprayer
Detail Level: Detailed
Number Of Freeze-Thaw Cycles: 3 freeze-thaw cycles
Render Note In Bullet Format When Appropriate: No
Consent: The patient's consent was obtained including but not limited to risks of crusting, scabbing, blistering, scarring, darker or lighter pigmentary change, recurrence, incomplete removal and infection.
Duration Of Freeze Thaw-Cycle (Seconds): 5

## 2025-06-02 ENCOUNTER — APPOINTMENT (OUTPATIENT)
Age: 75
Setting detail: DERMATOLOGY
End: 2025-06-02

## 2025-06-02 PROBLEM — D04.61 CARCINOMA IN SITU OF SKIN OF RIGHT UPPER LIMB, INCLUDING SHOULDER: Status: ACTIVE | Noted: 2025-06-02

## 2025-06-02 PROCEDURE — OTHER CURETTAGE AND DESTRUCTION: OTHER

## 2025-06-02 PROCEDURE — 17261 DSTRJ MAL LES T/A/L .6-1.0CM: CPT

## 2025-07-15 ENCOUNTER — HOSPITAL ENCOUNTER (INPATIENT)
Dept: HOSPITAL 99 - 4 EAST ACU | Age: 75
LOS: 3 days | Discharge: NURSING, REHAB OR LONG-TERM CARE | DRG: 65 | End: 2025-07-18
Payer: MEDICARE

## 2025-07-15 VITALS — DIASTOLIC BLOOD PRESSURE: 60 MMHG | SYSTOLIC BLOOD PRESSURE: 112 MMHG

## 2025-07-15 VITALS — SYSTOLIC BLOOD PRESSURE: 109 MMHG | DIASTOLIC BLOOD PRESSURE: 68 MMHG

## 2025-07-15 VITALS — SYSTOLIC BLOOD PRESSURE: 114 MMHG | DIASTOLIC BLOOD PRESSURE: 58 MMHG

## 2025-07-15 VITALS — DIASTOLIC BLOOD PRESSURE: 64 MMHG | SYSTOLIC BLOOD PRESSURE: 109 MMHG

## 2025-07-15 VITALS — SYSTOLIC BLOOD PRESSURE: 113 MMHG | DIASTOLIC BLOOD PRESSURE: 66 MMHG

## 2025-07-15 VITALS — SYSTOLIC BLOOD PRESSURE: 130 MMHG | DIASTOLIC BLOOD PRESSURE: 66 MMHG

## 2025-07-15 VITALS — DIASTOLIC BLOOD PRESSURE: 67 MMHG | SYSTOLIC BLOOD PRESSURE: 119 MMHG

## 2025-07-15 VITALS — SYSTOLIC BLOOD PRESSURE: 111 MMHG | DIASTOLIC BLOOD PRESSURE: 95 MMHG

## 2025-07-15 VITALS — BODY MASS INDEX: 28.2 KG/M2 | BODY MASS INDEX: 27.6 KG/M2

## 2025-07-15 VITALS — DIASTOLIC BLOOD PRESSURE: 69 MMHG | SYSTOLIC BLOOD PRESSURE: 101 MMHG

## 2025-07-15 VITALS — SYSTOLIC BLOOD PRESSURE: 119 MMHG | DIASTOLIC BLOOD PRESSURE: 71 MMHG

## 2025-07-15 VITALS — DIASTOLIC BLOOD PRESSURE: 124 MMHG | SYSTOLIC BLOOD PRESSURE: 137 MMHG

## 2025-07-15 VITALS — SYSTOLIC BLOOD PRESSURE: 119 MMHG | DIASTOLIC BLOOD PRESSURE: 67 MMHG

## 2025-07-15 VITALS — SYSTOLIC BLOOD PRESSURE: 125 MMHG | DIASTOLIC BLOOD PRESSURE: 82 MMHG

## 2025-07-15 DIAGNOSIS — Z86.73: ICD-10-CM

## 2025-07-15 DIAGNOSIS — Z87.19: ICD-10-CM

## 2025-07-15 DIAGNOSIS — D64.9: ICD-10-CM

## 2025-07-15 DIAGNOSIS — R47.1: ICD-10-CM

## 2025-07-15 DIAGNOSIS — R26.0: ICD-10-CM

## 2025-07-15 DIAGNOSIS — Z98.1: ICD-10-CM

## 2025-07-15 DIAGNOSIS — I63.541: Primary | ICD-10-CM

## 2025-07-15 DIAGNOSIS — M54.12: ICD-10-CM
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
## 2025-07-15 DIAGNOSIS — Z91.81: ICD-10-CM
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
## 2025-07-15 DIAGNOSIS — R11.2: ICD-10-CM

## 2025-07-15 DIAGNOSIS — E78.00: ICD-10-CM

## 2025-07-15 DIAGNOSIS — J45.909: ICD-10-CM

## 2025-07-15 DIAGNOSIS — M54.14: ICD-10-CM

## 2025-07-15 DIAGNOSIS — Z60.2: ICD-10-CM

## 2025-07-15 DIAGNOSIS — J38.01: ICD-10-CM

## 2025-07-15 DIAGNOSIS — M54.18: ICD-10-CM

## 2025-07-15 DIAGNOSIS — G25.0: ICD-10-CM

## 2025-07-15 DIAGNOSIS — E55.9: ICD-10-CM

## 2025-07-15 DIAGNOSIS — R13.10: ICD-10-CM

## 2025-07-15 DIAGNOSIS — G81.94: ICD-10-CM

## 2025-07-15 LAB
ALBUMIN SERPL-MCNC: 4.1 G/DL (ref 3.5–5)
ALP SERPL-CCNC: 40 U/L (ref 38–126)
ALT SERPL-CCNC: 24 U/L (ref 0–35)
AST SERPL-CCNC: 24 U/L (ref 14–36)
BASOPHILS # BLD AUTO: 0 10^3/UL (ref 0–0.2)
BASOPHILS NFR BLD AUTO: 0.2 % (ref 0–2)
BILIRUB SERPL-MCNC: 1.4 MG/DL (ref 0.2–1.3)
BUN SERPL-MCNC: 19 MG/DL (ref 7–17)
CALCIUM SERPL-MCNC: 9.2 MG/DL (ref 8.4–10.2)
CHLORIDE SERPL-SCNC: 106 MMOL/L (ref 98–107)
CO2 SERPL-SCNC: 27 MMOL/L (ref 22–30)
COMMENT: (no result)
CREAT SERPL-MCNC: 0.6 MG/DL (ref 0.6–1)
CRP SERPL-MCNC: 30.2 MG/L (ref 0–10)
EGFR: > 60
EOSINOPHIL # BLD AUTO: 0 10^3/UL (ref 0–0.7)
EOSINOPHIL NFR BLD AUTO: 0.2 % (ref 0–6)
ERYTHROCYTE [DISTWIDTH] IN BLOOD BY AUTOMATED COUNT: 13.2 % (ref 11.5–14.5)
ERYTHROCYTE [SEDIMENTATION RATE] IN BLOOD: 20 MM/HOUR (ref 0–20)
ESTIMATED CREATININE CLEARANCE: (no result) ML/MIN
ETHANOL SERPL-MCNC: (no result) MG/DL
GLUCOSE SERPL-MCNC: 111 MG/DL (ref 70–99)
HCT VFR BLD AUTO: 40 % (ref 37–47)
HGB BLD-MCNC: 13.5 G/DL (ref 12–16)
IMM GRANULOCYTES # BLD AUTO: 0 10^3/UL (ref 0–0.05)
IMM GRANULOCYTES NFR BLD AUTO: 0.4 % (ref 0–0.5)
IRON SERPL-MCNC: 32 UG/DL (ref 37–170)
LYMPHOCYTES # BLD AUTO: 0.7 10^3/UL (ref 1.2–3.4)
LYMPHOCYTES NFR BLD AUTO: 13.1 % (ref 20.5–51.1)
MAGNESIUM SERPL-MCNC: 2 MG/DL (ref 1.6–2.3)
MCH RBC QN AUTO: 32.4 PG (ref 27–31)
MCHC RBC AUTO-ENTMCNC: 33.8 G/DL (ref 33–37)
MCV RBC AUTO: 95.9 FL (ref 81–99)
MONOCYTES # BLD AUTO: 0.4 10^3/UL (ref 0.1–0.6)
MONOCYTES NFR BLD AUTO: 7 % (ref 1.7–9.3)
NEUTROPHILS # BLD AUTO: 4.3 10^3/UL (ref 1.4–6.5)
NEUTROPHILS NFR BLD AUTO: 79.1 % (ref 42.2–75.2)
NRBC BLD AUTO-RTO: 0 %
PLATELET # BLD AUTO: 144 10^3/UL (ref 130–400)
PMV BLD AUTO: 10 FL (ref 7.4–10.4)
POTASSIUM SERPL-SCNC: 4 MMOL/L (ref 3.5–5.1)
PROT SERPL-MCNC: 6.3 G/DL (ref 6.3–8.2)
RBC # BLD AUTO: 4.17 10^6/UL (ref 4.2–5.4)
SODIUM SERPL-SCNC: 138 MMOL/L (ref 135–145)
TROPONIN I SERPL-MCNC: < 0.012 NG/ML
WBC # BLD AUTO: 5.4 10^3/UL (ref 4.8–10.8)

## 2025-07-15 RX ADMIN — FLUTICASONE PROPIONATE 2 PUFF: 110 AEROSOL, METERED RESPIRATORY (INHALATION) at 20:51

## 2025-07-15 RX ADMIN — THIAMINE HYDROCHLORIDE 255 MG: 100 INJECTION, SOLUTION INTRAMUSCULAR; INTRAVENOUS at 22:14

## 2025-07-15 SDOH — SOCIAL STABILITY - SOCIAL INSECURITY: PROBLEMS RELATED TO LIVING ALONE: Z60.2

## 2025-07-16 VITALS — OXYGEN SATURATION: 94 % | SYSTOLIC BLOOD PRESSURE: 105 MMHG | DIASTOLIC BLOOD PRESSURE: 70 MMHG | HEART RATE: 71 BPM

## 2025-07-16 VITALS — SYSTOLIC BLOOD PRESSURE: 105 MMHG | DIASTOLIC BLOOD PRESSURE: 59 MMHG

## 2025-07-16 VITALS — DIASTOLIC BLOOD PRESSURE: 66 MMHG | SYSTOLIC BLOOD PRESSURE: 110 MMHG

## 2025-07-16 VITALS — DIASTOLIC BLOOD PRESSURE: 68 MMHG | SYSTOLIC BLOOD PRESSURE: 116 MMHG

## 2025-07-16 LAB
ALBUMIN SERPL-MCNC: 3.6 G/DL (ref 3.5–5)
ALP SERPL-CCNC: 40 U/L (ref 38–126)
ALT SERPL-CCNC: 23 U/L (ref 0–35)
AMPHETAMINES UR QL SCN: NEGATIVE
AST SERPL-CCNC: 27 U/L (ref 14–36)
BARBITURATES: NEGATIVE
BENZODIAZEPINES: NEGATIVE
BILIRUB SERPL-MCNC: 1 MG/DL (ref 0.2–1.3)
BUN SERPL-MCNC: 14 MG/DL (ref 7–17)
BUPRENORPHINE UR QL: NEGATIVE
CALCIUM SERPL-MCNC: 8.8 MG/DL (ref 8.4–10.2)
CHLORIDE SERPL-SCNC: 107 MMOL/L (ref 98–107)
CHOLEST SERPL-MCNC: 98 MG/DL (ref 50–199)
CO2 SERPL-SCNC: 25 MMOL/L (ref 22–30)
COCAINE UR QL SCN: NEGATIVE
COMMENT: (no result)
CREAT SERPL-MCNC: 0.6 MG/DL (ref 0.6–1)
EGFR: > 60
ERYTHROCYTE [DISTWIDTH] IN BLOOD BY AUTOMATED COUNT: 13.2 % (ref 11.5–14.5)
ESTIMATED CREATININE CLEARANCE: 70 ML/MIN
FOLATE SERPL-MCNC: 9 NG/ML (ref 2.76–20)
GLUCOSE SERPL-MCNC: 96 MG/DL (ref 70–99)
HBA1C MFR BLD: 5.8 % (ref 4–5.6)
HCT VFR BLD AUTO: 37.9 % (ref 37–47)
HDLC SERPL-MCNC: 61 MG/DL
HGB BLD-MCNC: 12.9 G/DL (ref 12–16)
LDLC SERPL CALC-MCNC: 21 MG/DL
MARIJUANA: NEGATIVE
MCH RBC QN AUTO: 32.3 PG (ref 27–31)
MCHC RBC AUTO-ENTMCNC: 34 G/DL (ref 33–37)
MCV RBC AUTO: 94.8 FL (ref 81–99)
METHADONE UR QL SCN: NEGATIVE
METHAMPHET UR QL SCN: NEGATIVE
OPIATES UR QL SCN: NEGATIVE
OXYCODONE UR QL SCN: NEGATIVE
PCP UR QL SCN: NEGATIVE
PLATELET # BLD AUTO: 140 10^3/UL (ref 130–400)
PMV BLD AUTO: 10.3 FL (ref 7.4–10.4)
POTASSIUM SERPL-SCNC: 3.9 MMOL/L (ref 3.5–5.1)
PROT SERPL-MCNC: 5.8 G/DL (ref 6.3–8.2)
RBC # BLD AUTO: 4 10^6/UL (ref 4.2–5.4)
SODIUM SERPL-SCNC: 135 MMOL/L (ref 135–145)
TRICYCLICS UR-MCNC: NEGATIVE NG/ML
TRIGL SERPL-MCNC: 82 MG/DL (ref 10–149)
TSH SERPL DL<=0.05 MIU/L-ACNC: 1.29 UIU/ML (ref 0.47–4.68)
VIT B12 SERPL-MCNC: 344 PG/ML (ref 239–931)
VLDLC SERPL CALC-MCNC: 16 MG/DL (ref 0–30)
WBC # BLD AUTO: 7.1 10^3/UL (ref 4.8–10.8)

## 2025-07-16 RX ADMIN — FAMOTIDINE 20 MG: 20 TABLET, FILM COATED ORAL at 07:54

## 2025-07-16 RX ADMIN — Medication 500 MG: at 07:41

## 2025-07-16 RX ADMIN — FLUTICASONE PROPIONATE 2 PUFF: 110 AEROSOL, METERED RESPIRATORY (INHALATION) at 13:28

## 2025-07-16 RX ADMIN — CYANOCOBALAMIN TAB 1000 MCG 1000 MCG: 1000 TAB at 11:55

## 2025-07-16 RX ADMIN — ACETAMINOPHEN 650 MG: 325 TABLET ORAL at 22:17

## 2025-07-16 RX ADMIN — EZETIMIBE 10 MG: 10 TABLET ORAL at 07:41

## 2025-07-16 RX ADMIN — ROSUVASTATIN CALCIUM 20 MG: 20 TABLET, FILM COATED ORAL at 07:41

## 2025-07-16 RX ADMIN — Medication 25 MCG: at 07:41

## 2025-07-16 RX ADMIN — ESTRADIOL 1 APPLIC: 0.1 CREAM VAGINAL at 22:18

## 2025-07-17 VITALS — DIASTOLIC BLOOD PRESSURE: 68 MMHG | SYSTOLIC BLOOD PRESSURE: 109 MMHG

## 2025-07-17 VITALS — SYSTOLIC BLOOD PRESSURE: 120 MMHG | OXYGEN SATURATION: 94 % | DIASTOLIC BLOOD PRESSURE: 68 MMHG | HEART RATE: 57 BPM

## 2025-07-17 VITALS — SYSTOLIC BLOOD PRESSURE: 114 MMHG | DIASTOLIC BLOOD PRESSURE: 69 MMHG

## 2025-07-17 VITALS — SYSTOLIC BLOOD PRESSURE: 102 MMHG | DIASTOLIC BLOOD PRESSURE: 63 MMHG

## 2025-07-17 LAB
ALBUMIN SERPL-MCNC: 3.5 G/DL (ref 3.5–5)
ALP SERPL-CCNC: 29 U/L (ref 38–126)
ALT SERPL-CCNC: 42 U/L (ref 0–35)
AST SERPL-CCNC: 41 U/L (ref 14–36)
BILIRUB SERPL-MCNC: 0.8 MG/DL (ref 0.2–1.3)
BUN SERPL-MCNC: 19 MG/DL (ref 7–17)
CALCIUM SERPL-MCNC: 8.8 MG/DL (ref 8.4–10.2)
CHLORIDE SERPL-SCNC: 107 MMOL/L (ref 98–107)
CO2 SERPL-SCNC: 30 MMOL/L (ref 22–30)
COMMENT: (no result)
CREAT SERPL-MCNC: 0.6 MG/DL (ref 0.6–1)
EGFR: > 60
ERYTHROCYTE [DISTWIDTH] IN BLOOD BY AUTOMATED COUNT: 13.2 % (ref 11.5–14.5)
ESTIMATED CREATININE CLEARANCE: 70 ML/MIN
GLUCOSE SERPL-MCNC: 94 MG/DL (ref 70–99)
HCT VFR BLD AUTO: 36.2 % (ref 37–47)
HGB BLD-MCNC: 12.4 G/DL (ref 12–16)
MCH RBC QN AUTO: 32.7 PG (ref 27–31)
MCHC RBC AUTO-ENTMCNC: 34.3 G/DL (ref 33–37)
MCV RBC AUTO: 95.5 FL (ref 81–99)
PLATELET # BLD AUTO: 141 10^3/UL (ref 130–400)
PMV BLD AUTO: 10.5 FL (ref 7.4–10.4)
POTASSIUM SERPL-SCNC: 4.4 MMOL/L (ref 3.5–5.1)
PROT SERPL-MCNC: 5.8 G/DL (ref 6.3–8.2)
RBC # BLD AUTO: 3.79 10^6/UL (ref 4.2–5.4)
SODIUM SERPL-SCNC: 137 MMOL/L (ref 135–145)
WBC # BLD AUTO: 5.1 10^3/UL (ref 4.8–10.8)

## 2025-07-17 RX ADMIN — FLUTICASONE PROPIONATE 2 PUFF: 110 AEROSOL, METERED RESPIRATORY (INHALATION) at 14:01

## 2025-07-17 RX ADMIN — CYANOCOBALAMIN TAB 1000 MCG 1000 MCG: 1000 TAB at 08:22

## 2025-07-17 RX ADMIN — CLOPIDOGREL BISULFATE 75 MG: 75 TABLET ORAL at 08:22

## 2025-07-17 RX ADMIN — FAMOTIDINE 20 MG: 20 TABLET, FILM COATED ORAL at 08:22

## 2025-07-17 RX ADMIN — ASPIRIN 81 MG: 81 TABLET, CHEWABLE ORAL at 08:22

## 2025-07-17 RX ADMIN — Medication 25 MCG: at 08:22

## 2025-07-17 RX ADMIN — EZETIMIBE 10 MG: 10 TABLET ORAL at 08:21

## 2025-07-17 RX ADMIN — ROSUVASTATIN CALCIUM 20 MG: 20 TABLET, FILM COATED ORAL at 08:21

## 2025-07-17 RX ADMIN — Medication 500 MG: at 08:22

## 2025-07-18 VITALS — DIASTOLIC BLOOD PRESSURE: 66 MMHG | SYSTOLIC BLOOD PRESSURE: 114 MMHG

## 2025-07-18 VITALS — SYSTOLIC BLOOD PRESSURE: 107 MMHG | DIASTOLIC BLOOD PRESSURE: 64 MMHG

## 2025-07-18 LAB
ALBUMIN SERPL-MCNC: 3.5 G/DL (ref 3.5–5)
ALP SERPL-CCNC: 43 U/L (ref 38–126)
ALT SERPL-CCNC: 68 U/L (ref 0–35)
AST SERPL-CCNC: 59 U/L (ref 14–36)
BILIRUB SERPL-MCNC: 0.7 MG/DL (ref 0.2–1.3)
BUN SERPL-MCNC: 15 MG/DL (ref 7–17)
CALCIUM SERPL-MCNC: 8.7 MG/DL (ref 8.4–10.2)
CHLORIDE SERPL-SCNC: 106 MMOL/L (ref 98–107)
CO2 SERPL-SCNC: 29 MMOL/L (ref 22–30)
COMMENT: (no result)
CREAT SERPL-MCNC: 0.6 MG/DL (ref 0.6–1)
EGFR: > 60
ERYTHROCYTE [DISTWIDTH] IN BLOOD BY AUTOMATED COUNT: 12.9 % (ref 11.5–14.5)
ESTIMATED CREATININE CLEARANCE: 70 ML/MIN
GLUCOSE SERPL-MCNC: 97 MG/DL (ref 70–99)
HCT VFR BLD AUTO: 35.8 % (ref 37–47)
HGB BLD-MCNC: 12.2 G/DL (ref 12–16)
MCH RBC QN AUTO: 32.4 PG (ref 27–31)
MCHC RBC AUTO-ENTMCNC: 34.1 G/DL (ref 33–37)
MCV RBC AUTO: 95.2 FL (ref 81–99)
PLATELET # BLD AUTO: 141 10^3/UL (ref 130–400)
PMV BLD AUTO: 10.2 FL (ref 7.4–10.4)
POTASSIUM SERPL-SCNC: 3.7 MMOL/L (ref 3.5–5.1)
PROT SERPL-MCNC: 5.5 G/DL (ref 6.3–8.2)
RBC # BLD AUTO: 3.76 10^6/UL (ref 4.2–5.4)
SODIUM SERPL-SCNC: 139 MMOL/L (ref 135–145)
WBC # BLD AUTO: 6 10^3/UL (ref 4.8–10.8)

## 2025-07-18 RX ADMIN — CLOPIDOGREL BISULFATE 75 MG: 75 TABLET ORAL at 08:11

## 2025-07-18 RX ADMIN — CYANOCOBALAMIN TAB 1000 MCG 1000 MCG: 1000 TAB at 08:11

## 2025-07-18 RX ADMIN — FAMOTIDINE 20 MG: 20 TABLET, FILM COATED ORAL at 08:11

## 2025-07-18 RX ADMIN — Medication 500 MG: at 08:11

## 2025-07-18 RX ADMIN — ASPIRIN 81 MG: 81 TABLET, CHEWABLE ORAL at 08:11

## 2025-07-18 RX ADMIN — Medication 25 MCG: at 08:11

## 2025-07-18 RX ADMIN — ROSUVASTATIN CALCIUM 20 MG: 20 TABLET, FILM COATED ORAL at 08:11

## 2025-07-18 RX ADMIN — FLUTICASONE PROPIONATE 2 PUFF: 110 AEROSOL, METERED RESPIRATORY (INHALATION) at 11:56

## 2025-07-30 ENCOUNTER — TELEPHONE (OUTPATIENT)
Dept: SCHEDULING | Facility: CLINIC | Age: 75
End: 2025-07-30
Payer: MEDICARE

## 2025-08-13 ENCOUNTER — OFFICE VISIT (OUTPATIENT)
Dept: CARDIOLOGY | Facility: CLINIC | Age: 75
End: 2025-08-13
Payer: MEDICARE

## 2025-08-13 VITALS
WEIGHT: 141 LBS | SYSTOLIC BLOOD PRESSURE: 122 MMHG | BODY MASS INDEX: 27.68 KG/M2 | HEIGHT: 60 IN | OXYGEN SATURATION: 98 % | HEART RATE: 51 BPM | RESPIRATION RATE: 18 BRPM | DIASTOLIC BLOOD PRESSURE: 76 MMHG

## 2025-08-13 DIAGNOSIS — E78.00 HYPERCHOLESTEROLEMIA: ICD-10-CM

## 2025-08-13 DIAGNOSIS — I25.10 CORONARY ARTERY CALCIFICATION SEEN ON CT SCAN: ICD-10-CM

## 2025-08-13 DIAGNOSIS — G47.33 OBSTRUCTIVE SLEEP APNEA SYNDROME: ICD-10-CM

## 2025-08-13 DIAGNOSIS — I63.9 CEREBROVASCULAR ACCIDENT (CVA), UNSPECIFIED MECHANISM (CMS/HCC): Primary | ICD-10-CM

## 2025-08-13 DIAGNOSIS — I77.89 ASCENDING AORTA ENLARGEMENT (CMS/HCC): ICD-10-CM

## 2025-08-13 PROCEDURE — 93000 ELECTROCARDIOGRAM COMPLETE: CPT | Performed by: NURSE PRACTITIONER

## 2025-08-13 PROCEDURE — 99215 OFFICE O/P EST HI 40 MIN: CPT | Performed by: NURSE PRACTITIONER

## 2025-08-13 RX ORDER — VIT C/E/ZN/COPPR/LUTEIN/ZEAXAN 250MG-90MG
CAPSULE ORAL DAILY
COMMUNITY

## 2025-08-13 RX ORDER — LANOLIN ALCOHOL/MO/W.PET/CERES
500 CREAM (GRAM) TOPICAL DAILY
COMMUNITY

## 2025-08-13 RX ORDER — FLUTICASONE PROPIONATE 100 UG/1
1 POWDER, METERED RESPIRATORY (INHALATION)
COMMUNITY

## 2025-08-13 ASSESSMENT — ENCOUNTER SYMPTOMS
SHORTNESS OF BREATH: 0
IRREGULAR HEARTBEAT: 0
DYSPNEA ON EXERTION: 0
CONSTITUTIONAL NEGATIVE: 1
GASTROINTESTINAL NEGATIVE: 1
HEADACHES: 0
MUSCULOSKELETAL NEGATIVE: 1
RESPIRATORY NEGATIVE: 1
ALLERGIC/IMMUNOLOGIC NEGATIVE: 1
PND: 0
SYNCOPE: 0
PALPITATIONS: 0
EYES NEGATIVE: 1
PSYCHIATRIC NEGATIVE: 1
LIGHT-HEADEDNESS: 0
HEMATOLOGIC/LYMPHATIC NEGATIVE: 1
NEAR-SYNCOPE: 0
ORTHOPNEA: 0
ENDOCRINE NEGATIVE: 1

## 2025-08-14 LAB
ATRIAL RATE: 51
P AXIS: 48
PR INTERVAL: 192
QRS DURATION: 76
QT INTERVAL: 430
QTC CALCULATION(BAZETT): 396
R AXIS: -49
T WAVE AXIS: 13
VENTRICULAR RATE: 51

## 2025-08-20 ENCOUNTER — TELEPHONE (OUTPATIENT)
Dept: SCHEDULING | Facility: CLINIC | Age: 75
End: 2025-08-20
Payer: MEDICARE

## (undated) DEVICE — FORCEP COLD RADIAL JAW